# Patient Record
Sex: MALE | NOT HISPANIC OR LATINO | Employment: FULL TIME | URBAN - METROPOLITAN AREA
[De-identification: names, ages, dates, MRNs, and addresses within clinical notes are randomized per-mention and may not be internally consistent; named-entity substitution may affect disease eponyms.]

---

## 2017-04-29 ENCOUNTER — OFFICE VISIT (OUTPATIENT)
Dept: URGENT CARE | Facility: URGENT CARE | Age: 47
End: 2017-04-29
Payer: COMMERCIAL

## 2017-04-29 VITALS
WEIGHT: 245 LBS | TEMPERATURE: 96.2 F | OXYGEN SATURATION: 98 % | HEART RATE: 97 BPM | HEIGHT: 74 IN | SYSTOLIC BLOOD PRESSURE: 147 MMHG | BODY MASS INDEX: 31.44 KG/M2 | DIASTOLIC BLOOD PRESSURE: 96 MMHG

## 2017-04-29 DIAGNOSIS — R10.821 RIGHT UPPER QUADRANT ABDOMINAL TENDERNESS WITH REBOUND TENDERNESS: Primary | ICD-10-CM

## 2017-04-29 LAB
ALBUMIN UR-MCNC: 100 MG/DL
APPEARANCE UR: CLEAR
BILIRUB UR QL STRIP: NEGATIVE
C TRACH DNA SPEC QL NAA+PROBE: ABNORMAL
COLOR UR AUTO: YELLOW
GLUCOSE UR STRIP-MCNC: 500 MG/DL
HGB UR QL STRIP: ABNORMAL
KETONES UR STRIP-MCNC: NEGATIVE MG/DL
LEUKOCYTE ESTERASE UR QL STRIP: NEGATIVE
N GONORRHOEA DNA SPEC QL NAA+PROBE: ABNORMAL
NITRATE UR QL: NEGATIVE
PH UR STRIP: 5.5 PH (ref 5–7)
RBC #/AREA URNS AUTO: NORMAL /HPF (ref 0–2)
SP GR UR STRIP: 1.01 (ref 1–1.03)
SPECIMEN SOURCE: ABNORMAL
SPECIMEN SOURCE: ABNORMAL
URN SPEC COLLECT METH UR: ABNORMAL
UROBILINOGEN UR STRIP-ACNC: 1 EU/DL (ref 0.2–1)
WBC #/AREA URNS AUTO: NORMAL /HPF (ref 0–2)

## 2017-04-29 PROCEDURE — 87591 N.GONORRHOEAE DNA AMP PROB: CPT | Performed by: PHYSICIAN ASSISTANT

## 2017-04-29 PROCEDURE — 87491 CHLMYD TRACH DNA AMP PROBE: CPT | Performed by: PHYSICIAN ASSISTANT

## 2017-04-29 PROCEDURE — 81001 URINALYSIS AUTO W/SCOPE: CPT | Performed by: PHYSICIAN ASSISTANT

## 2017-04-29 PROCEDURE — 99203 OFFICE O/P NEW LOW 30 MIN: CPT | Performed by: PHYSICIAN ASSISTANT

## 2017-04-29 NOTE — LETTER
[facility] URGENT [facility]ARE  [address] [facility] [address]  Phone: [phone]    April 29, 2017        Scott Donato  [address] [place] [id number]          To whom it may concern:    RE: Scott Donato    Patient was seen and treated today at our clinic and missed work. Please excuse him from work on 4/29/2017.    Please contact me for questions or concerns.      Sincerely,        [facility] Provider

## 2017-04-29 NOTE — PROGRESS NOTES
"SUBJECTIVE  HPI:  Scott Donato is a 47 year old male who presents with the CC of abdominal/pelvic pain.    Pain is located in the RUQ area, with radiation to None.  The pain is characterized as dull, stabbing and throbbing, and at worst is a level 4 on a scale of 1-10.  Pain has been present for 1 week(s) and is rapidly progressively worsening. This morning at 3A, he noted that the pain in his abdomen has significantly increased.  EXACERBATING FACTORS: Pressure, movement  RELIEVING FACTORS: nothing.  ASSOCIATED SX: constipation.    No past medical history on file.  Current Outpatient Prescriptions   Medication Sig Dispense Refill     Insulin Aspart (NOVOLOG SC)        Insulin Detemir (LEVEMIR SC)        DOXYCYCLINE HYCLATE PO        aspirin 81 MG tablet Take by mouth daily       Social History   Substance Use Topics     Smoking status: Never Smoker     Smokeless tobacco: Not on file     Alcohol use Not on file     ROS:  C: NEGATIVE for fever, chills, change in weight  INTEGUMENTARY/SKIN: NEGATIVE for worrisome rashes, moles or lesions  E/M: NEGATIVE for sore throat, ear or sinus problems  R: NEGATIVE for cough  CV: NEGATIVE for chest pain, palpitations or peripheral edema  GI: POSITIVE for abdominal pain. POSITIVE for constipation.  : NEGATIVE for dysuria, hematuria, decreased urinary stream or discharge  MUSCULOSKELETAL: NEGATIVE for significant arthralgias or myalgia  NEURO: NEGATIVE for weakness, dizziness or paresthesias  ENDOCRINE: POSITIVE for HX of DM2  HEME/ALLERGY/IMMUNE: NEGATIVE for swollen nodes      OBJECTIVE:  BP (!) 147/96 (BP Location: Left arm, Patient Position: Chair, Cuff Size: Adult Large)  Pulse 97  Temp 96.2  F (35.7  C) (Tympanic)  Ht 6' 2\" (1.88 m)  Wt 245 lb (111.1 kg)  SpO2 98%  BMI 31.46 kg/m2  GENERAL APPEARANCE: healthy, alert and no distress  NECK: supple, nontender, no lymphadenopathy  RESP: lungs clear to auscultation - no rales, rhonchi or wheezes  CV: regular rates and " rhythm, normal S1 S2, no murmur noted  ABDOMEN: obese, soft, tenderness moderate RUQ. NO rebound tenderness.   NEURO: Normal strength and tone, sensory exam grossly normal,  normal speech and mentation  SKIN: no suspicious lesions or rashes    ASSESSMENT/PLAN:  1. Abdominal pain  Patient has tenderness to palpation with light touch. Sending to ER for further evaluation.   - *UA reflex to Microscopic and Culture ([place] [facility] (except [place] and [place])  - Urine Microscopic    Lindsey May PA-C

## 2017-04-29 NOTE — MR AVS SNAPSHOT
"              After Visit Summary   2017    Scott Donato    MRN: [id number]           Patient Information     Date Of Birth          1970        Visit Information        Provider Department      2017 8:15 AM Lindsey May PA-C [facility]        Today's Diagnoses     Right upper quadrant abdominal tenderness with rebound tenderness    -  1       Follow-ups after your visit        Who to contact     If you have questions or need follow up information about today's clinic visit or your schedule please contact [facility]RG[facility] directly at [phone].  Normal or non-critical lab and imaging results will be communicated to you by [employer], letter or phone within 4 business days after the clinic has received the results. If you do not hear from us within 7 days, please contact the clinic through [employer] or phone. If you have a critical or abnormal lab result, we will notify you by phone as soon as possible.  Submit refill requests through [employer] or call your pharmacy and they will forward the refill request to us. Please allow 3 business days for your refill to be completed.          Additional Information About Your Visit        MyChart Information     [employer] lets you send messages to your doctor, view your test results, renew your prescriptions, schedule appointments and more. To sign up, go to [email or web address] . Click on \"Log in\" on the left side of the screen, which will take you to the Welcome page. Then click on \"Sign up Now\" on the right side of the page.     You will be asked to enter the access code listed below, as well as some personal information. Please follow the directions to create your username and password.     Your access code is: [email or web address]  Expires: 2017  9:17 AM     Your access code will  in 90 days. If you need help or a new code, please call your [place] clinic or [phone].        Care EveryWhere ID     " "This is your Care EveryWhere ID. This could be used by other organizations to access your [place] medical records  [id number]        Your Vitals Were     Pulse Temperature Height Pulse Oximetry BMI (Body Mass Index)       97 96.2  F (35.7  C) (Tympanic) 6' 2\" (1.88 m) 98% 31.46 kg/m2        Blood Pressure from Last 3 Encounters:   04/29/17 (!) 147/96    Weight from Last 3 Encounters:   04/29/17 245 lb (111.1 kg)              We Performed the Following     *UA reflex to Microscopic and Culture ([place] and [place] [facility] (except [place] and [place])     Chlamydia trachomatis PCR     Neisseria gonorrhoeae PCR     Urine Microscopic        Primary Care Provider    None Specified       No primary provider on file.        Thank you!     Thank you for choosing [facility]  for your care. Our goal is always to provide you with excellent care. Hearing back from our patients is one way we can continue to improve our services. Please take a few minutes to complete the written survey that you may receive in the mail after your visit with us. Thank you!             Your Updated Medication List - Protect others around you: Learn how to safely use, store and throw away your medicines at www.disposemymeds.org.          This list is accurate as of: 4/29/17  9:17 AM.  Always use your most recent med list.                   Brand Name Dispense Instructions for use    aspirin 81 MG tablet      Take by mouth daily       DOXYCYCLINE HYCLATE PO          LEVEMIR SC          NOVOLOG SC            "

## 2017-04-29 NOTE — LETTER
[facility] URGENT [facility]  [address] [facility] [address]  Phone: [phone]    April 29, 2017        Scott Donato  [address]  [facility] [address]          To whom it may concern:    RE: Scott Donato    Patient was seen and treated today at our clinic and missed work. Please excuse him from work on 4/29/2017.     Please contact me for questions or concerns.      Sincerely,        [facility] Provider

## 2017-04-29 NOTE — NURSING NOTE
"Chief Complaint   Patient presents with     Urgent Care     Pt in clinic to have eval for right side abdominal pain.     Abdominal Pain       Initial BP (!) 147/96 (BP Location: Left arm, Patient Position: Chair, Cuff Size: Adult Large)  Pulse 97  Temp 96.2  F (35.7  C) (Tympanic)  Ht 6' 2\" (1.88 m)  Wt 245 lb (111.1 kg)  SpO2 98%  BMI 31.46 kg/m2 Estimated body mass index is 31.46 kg/(m^2) as calculated from the following:    Height as of this encounter: 6' 2\" (1.88 m).    Weight as of this encounter: 245 lb (111.1 kg).  Medication Reconciliation: complete   Bridget Conteh/ MA    "

## 2024-07-02 ENCOUNTER — TELEPHONE (OUTPATIENT)
Dept: CARDIOLOGY | Facility: CLINIC | Age: 54
End: 2024-07-02
Payer: COMMERCIAL

## 2024-07-02 ENCOUNTER — PREP FOR PROCEDURE (OUTPATIENT)
Dept: CARDIOLOGY | Facility: CLINIC | Age: 54
End: 2024-07-02
Payer: COMMERCIAL

## 2024-07-02 DIAGNOSIS — I25.10 CAD (CORONARY ARTERY DISEASE): Primary | ICD-10-CM

## 2024-07-02 NOTE — TELEPHONE ENCOUNTER
RNCC spoke with patient regarding surgery at the  with Dr. Giles. Reviewed pre operative and post operative eduction including sternal precautions and expected hospital course. Will plan for surgery with Dr. Giles 8/13 with pre operative testing at the [facility] prior. Patient verbalized understanding. Email sent to patient with pre operative folder as well as RNCC contact information.

## 2024-07-03 ENCOUNTER — HOSPITAL ENCOUNTER (INPATIENT)
Facility: CLINIC | Age: 54
Setting detail: SURGERY ADMIT
End: 2024-07-03
Attending: THORACIC SURGERY (CARDIOTHORACIC VASCULAR SURGERY) | Admitting: THORACIC SURGERY (CARDIOTHORACIC VASCULAR SURGERY)
Payer: COMMERCIAL

## 2024-07-03 DIAGNOSIS — I25.10 CAD (CORONARY ARTERY DISEASE): Primary | ICD-10-CM

## 2024-07-05 ENCOUNTER — TELEPHONE (OUTPATIENT)
Dept: CARDIOLOGY | Facility: CLINIC | Age: 54
End: 2024-07-05
Payer: COMMERCIAL

## 2024-07-05 NOTE — TELEPHONE ENCOUNTER
FUTURE VISIT INFORMATION      SURGERY INFORMATION:  Date: 7/23/2024  Location: [facility] OR   Surgeon:  Evan Giles MD   Anesthesia Type:  General   Procedure: INSERTION, INTRA-AORTIC BALLOON PUMP   CORONARY ARTERY BYPASS GRAFT AND ANY INDICATED PROCEDURES     Action    Action Taken 7/5/2024 2:56pm KEKHANH     I called pt Scott - Yes, his has a PCP. [facility] [phone]     RECORDS REQUESTED FROM:       Primary Care Provider: PCP @ [facility]     Pertinent Medical History:    Most recent EKG+ Tracing: Order     Most recent ECHO:    Most recent Cardiac Stress Test: NA    Most recent Coronary Angiogram:    Most recent PFT's: Upcoming     Most recent Sleep Study: NA

## 2024-07-08 ENCOUNTER — TELEPHONE (OUTPATIENT)
Dept: CARDIOLOGY | Facility: CLINIC | Age: 54
End: 2024-07-08
Payer: COMMERCIAL

## 2024-07-20 ENCOUNTER — HEALTH MAINTENANCE LETTER (OUTPATIENT)
Age: 54
End: 2024-07-20

## 2024-07-22 LAB
ABO/RH(D): NORMAL
ANTIBODY SCREEN: NEGATIVE
SPECIMEN EXPIRATION DATE: NORMAL

## 2024-07-23 ENCOUNTER — OFFICE VISIT (OUTPATIENT)
Dept: SURGERY | Facility: CLINIC | Age: 54
End: 2024-07-23
Payer: COMMERCIAL

## 2024-07-23 ENCOUNTER — PRE VISIT (OUTPATIENT)
Dept: SURGERY | Facility: CLINIC | Age: 54
End: 2024-07-23

## 2024-07-23 ENCOUNTER — ANCILLARY PROCEDURE (OUTPATIENT)
Dept: ULTRASOUND IMAGING | Facility: CLINIC | Age: 54
End: 2024-07-23
Attending: THORACIC SURGERY (CARDIOTHORACIC VASCULAR SURGERY)
Payer: COMMERCIAL

## 2024-07-23 ENCOUNTER — OFFICE VISIT (OUTPATIENT)
Dept: PULMONOLOGY | Facility: CLINIC | Age: 54
End: 2024-07-23
Attending: THORACIC SURGERY (CARDIOTHORACIC VASCULAR SURGERY)
Payer: COMMERCIAL

## 2024-07-23 ENCOUNTER — LAB (OUTPATIENT)
Dept: LAB | Facility: CLINIC | Age: 54
End: 2024-07-23
Payer: COMMERCIAL

## 2024-07-23 ENCOUNTER — ANCILLARY PROCEDURE (OUTPATIENT)
Dept: GENERAL RADIOLOGY | Facility: CLINIC | Age: 54
End: 2024-07-23
Attending: THORACIC SURGERY (CARDIOTHORACIC VASCULAR SURGERY)
Payer: COMMERCIAL

## 2024-07-23 VITALS
HEART RATE: 92 BPM | BODY MASS INDEX: 30.29 KG/M2 | SYSTOLIC BLOOD PRESSURE: 106 MMHG | RESPIRATION RATE: 16 BRPM | OXYGEN SATURATION: 97 % | TEMPERATURE: 97.6 F | HEIGHT: 74 IN | WEIGHT: 236 LBS | DIASTOLIC BLOOD PRESSURE: 74 MMHG

## 2024-07-23 DIAGNOSIS — I25.10 CAD (CORONARY ARTERY DISEASE): ICD-10-CM

## 2024-07-23 DIAGNOSIS — Z01.818 PRE-OP EVALUATION: Primary | ICD-10-CM

## 2024-07-23 LAB
ALBUMIN SERPL BCG-MCNC: 4.4 G/DL (ref 3.5–5.2)
ALBUMIN UR-MCNC: NEGATIVE MG/DL
ALP SERPL-CCNC: 146 U/L (ref 40–150)
ALT SERPL W P-5'-P-CCNC: 21 U/L (ref 0–70)
ANION GAP SERPL CALCULATED.3IONS-SCNC: 11 MMOL/L (ref 7–15)
APPEARANCE UR: CLEAR
APTT PPP: 30 SECONDS (ref 22–38)
AST SERPL W P-5'-P-CCNC: 27 U/L (ref 0–45)
BILIRUB SERPL-MCNC: 0.7 MG/DL
BILIRUB UR QL STRIP: NEGATIVE
BUN SERPL-MCNC: 26.4 MG/DL (ref 6–20)
CALCIUM SERPL-MCNC: 9.7 MG/DL (ref 8.8–10.4)
CHLORIDE SERPL-SCNC: 100 MMOL/L (ref 98–107)
COLOR UR AUTO: ABNORMAL
CREAT SERPL-MCNC: 1.38 MG/DL (ref 0.67–1.17)
EGFRCR SERPLBLD CKD-EPI 2021: 60 ML/MIN/1.73M2
ERYTHROCYTE [DISTWIDTH] IN BLOOD BY AUTOMATED COUNT: 13.8 % (ref 10–15)
GLUCOSE SERPL-MCNC: 167 MG/DL (ref 70–99)
GLUCOSE UR STRIP-MCNC: >=1000 MG/DL
HCO3 SERPL-SCNC: 30 MMOL/L (ref 22–29)
HCT VFR BLD AUTO: 47.4 % (ref 40–53)
HGB BLD-MCNC: 15.8 G/DL (ref 13.3–17.7)
HGB UR QL STRIP: NEGATIVE
INR PPP: 0.96 (ref 0.85–1.15)
KETONES UR STRIP-MCNC: NEGATIVE MG/DL
LEUKOCYTE ESTERASE UR QL STRIP: NEGATIVE
MAGNESIUM SERPL-MCNC: 2.2 MG/DL (ref 1.7–2.3)
MCH RBC QN AUTO: 30.3 PG (ref 26.5–33)
MCHC RBC AUTO-ENTMCNC: 33.3 G/DL (ref 31.5–36.5)
MCV RBC AUTO: 91 FL (ref 78–100)
NITRATE UR QL: NEGATIVE
PH UR STRIP: 5.5 [PH] (ref 5–7)
PLATELET # BLD AUTO: 173 10E3/UL (ref 150–450)
POTASSIUM SERPL-SCNC: 4.1 MMOL/L (ref 3.4–5.3)
PREALB SERPL-MCNC: 38.3 MG/DL (ref 20–40)
PROT SERPL-MCNC: 8 G/DL (ref 6.4–8.3)
RBC # BLD AUTO: 5.21 10E6/UL (ref 4.4–5.9)
RBC URINE: 1 /HPF
SODIUM SERPL-SCNC: 141 MMOL/L (ref 135–145)
SP GR UR STRIP: 1.01 (ref 1–1.03)
UROBILINOGEN UR STRIP-MCNC: NORMAL MG/DL
WBC # BLD AUTO: 5.8 10E3/UL (ref 4–11)
WBC URINE: 1 /HPF

## 2024-07-23 PROCEDURE — 94729 DIFFUSING CAPACITY: CPT | Performed by: INTERNAL MEDICINE

## 2024-07-23 PROCEDURE — 94375 RESPIRATORY FLOW VOLUME LOOP: CPT | Performed by: INTERNAL MEDICINE

## 2024-07-23 PROCEDURE — 71046 X-RAY EXAM CHEST 2 VIEWS: CPT | Mod: GC | Performed by: RADIOLOGY

## 2024-07-23 PROCEDURE — 84134 ASSAY OF PREALBUMIN: CPT | Performed by: THORACIC SURGERY (CARDIOTHORACIC VASCULAR SURGERY)

## 2024-07-23 PROCEDURE — 99000 SPECIMEN HANDLING OFFICE-LAB: CPT | Performed by: PATHOLOGY

## 2024-07-23 PROCEDURE — 93880 EXTRACRANIAL BILAT STUDY: CPT | Performed by: RADIOLOGY

## 2024-07-23 PROCEDURE — 85610 PROTHROMBIN TIME: CPT | Performed by: PATHOLOGY

## 2024-07-23 PROCEDURE — 81001 URINALYSIS AUTO W/SCOPE: CPT | Performed by: PATHOLOGY

## 2024-07-23 PROCEDURE — 86900 BLOOD TYPING SEROLOGIC ABO: CPT

## 2024-07-23 PROCEDURE — 80053 COMPREHEN METABOLIC PANEL: CPT | Performed by: PATHOLOGY

## 2024-07-23 PROCEDURE — 85027 COMPLETE CBC AUTOMATED: CPT | Performed by: PATHOLOGY

## 2024-07-23 PROCEDURE — 36415 COLL VENOUS BLD VENIPUNCTURE: CPT | Performed by: PATHOLOGY

## 2024-07-23 PROCEDURE — 85730 THROMBOPLASTIN TIME PARTIAL: CPT | Performed by: PATHOLOGY

## 2024-07-23 PROCEDURE — 93970 EXTREMITY STUDY: CPT | Performed by: RADIOLOGY

## 2024-07-23 PROCEDURE — 99204 OFFICE O/P NEW MOD 45 MIN: CPT | Performed by: NURSE PRACTITIONER

## 2024-07-23 PROCEDURE — 94726 PLETHYSMOGRAPHY LUNG VOLUMES: CPT | Performed by: INTERNAL MEDICINE

## 2024-07-23 PROCEDURE — 93000 ELECTROCARDIOGRAM COMPLETE: CPT | Performed by: INTERNAL MEDICINE

## 2024-07-23 PROCEDURE — 83735 ASSAY OF MAGNESIUM: CPT | Performed by: PATHOLOGY

## 2024-07-23 RX ORDER — EPINEPHRINE 0.3 MG/.3ML
0.3 INJECTION SUBCUTANEOUS PRN
COMMUNITY
Start: 2023-04-18

## 2024-07-23 RX ORDER — B-COMPLEX WITH VITAMIN C
1 TABLET ORAL DAILY
COMMUNITY
Start: 2024-06-14

## 2024-07-23 RX ORDER — EPLERENONE 25 MG/1
25 TABLET, FILM COATED ORAL EVERY MORNING
COMMUNITY
Start: 2024-06-11

## 2024-07-23 RX ORDER — BUMETANIDE 1 MG/1
1 TABLET ORAL PRN
COMMUNITY
Start: 2024-05-07

## 2024-07-23 RX ORDER — ALBUTEROL SULFATE 90 UG/1
2 AEROSOL, METERED RESPIRATORY (INHALATION) EVERY 4 HOURS PRN
COMMUNITY
Start: 2024-01-11

## 2024-07-23 RX ORDER — ATORVASTATIN CALCIUM 40 MG/1
40 TABLET, FILM COATED ORAL AT BEDTIME
COMMUNITY
Start: 2024-04-18

## 2024-07-23 RX ORDER — CARVEDILOL 6.25 MG/1
6.25 TABLET ORAL 2 TIMES DAILY WITH MEALS
Status: ON HOLD | COMMUNITY
Start: 2024-06-18 | End: 2024-09-16

## 2024-07-23 RX ORDER — CETIRIZINE HYDROCHLORIDE 10 MG/1
10 TABLET ORAL DAILY
COMMUNITY

## 2024-07-23 RX ORDER — BUDESONIDE AND FORMOTEROL FUMARATE DIHYDRATE 80; 4.5 UG/1; UG/1
2 AEROSOL RESPIRATORY (INHALATION) PRN
COMMUNITY
Start: 2023-07-17

## 2024-07-23 RX ORDER — SEMAGLUTIDE 2.68 MG/ML
2 INJECTION, SOLUTION SUBCUTANEOUS
COMMUNITY
Start: 2024-07-10

## 2024-07-23 ASSESSMENT — LIFESTYLE VARIABLES: TOBACCO_USE: 1

## 2024-07-23 ASSESSMENT — NEW YORK HEART ASSOCIATION (NYHA) CLASSIFICATION: NYHA FUNCTIONAL CLASS: III

## 2024-07-23 ASSESSMENT — ENCOUNTER SYMPTOMS: ORTHOPNEA: 1

## 2024-07-23 ASSESSMENT — PAIN SCALES - GENERAL: PAINLEVEL: NO PAIN (0)

## 2024-07-23 NOTE — PATIENT INSTRUCTIONS
Preparing for Your Surgery      Name:  Scott Donato   MRN:  [id number]   :  1970   Today's Date:  2024       Arriving for surgery:  Surgery date:  24  Arrival time:  5.30AM    Please come to:     Please come to:       [facility] [employer] [facility]    [address] [place]  [address]     The [facility] [place] Patient/Visitor Ramp is at [address]. Patients and visitors who self-park will receive the reduced hospital parking rate. If the Patient /Visitor Ramp is full, please follow the signs to the [employer] car park located at the main hospital entrance.       parking is available (24 hours/ 7 days a week)      Discounted parking pass options are available for patients and visitors. They can be purchased at the [employer] desk at the main hospital entrance.     -    Stop at the security desk and they will direct surgery patients to the Surgery Check in and Family [facility]. [phone]        - If you need directions, a wheelchair or an escort please stop at the Information/security desk in the lobby.     What can I eat or drink?  -  You may eat and drink normally up to 8 hours prior to arrival time. (Until 9.30PM)  -  You may have clear liquids until 2 hours prior to arrival time. (Until 3.30AM)    Examples of clear liquids:  Water  Clear broth  Juices (apple, white grape, white cranberry  and cider) without pulp  Noncarbonated, powder based beverages  (lemonade and Mat-Aid)  Sodas (Sprite, 7-Up, ginger ale and seltzer)  Coffee or tea (without milk or cream)  Gatorade    -  No Alcohol or cannabis products for at least 24 hours before surgery.     Which medicines can I take?    Hold Multivitamins for 10 days before surgery.  Hold Supplements for 10 days before surgery.  Hold Ibuprofen (Advil, Motrin) for 10 day(s) before surgery--unless otherwise directed by surgeon.  Hold Naproxen (Aleve)  for 10 days before surgery.    Hold Jardiance for 3 days before surgery.  Hold Sacubitril-valsartan (Entresto) for 2 days before surgery.  Hold Ozempic injection on Friday 8/9/24. Last dose is on Friday 8/2/24.    -  DO NOT take these medications the day of surgery:  Continue to hold Jardiance, Entresto and Ozempic injection.  Aspirin  Bumetanide (Bumex)  Cetirizine (Zyrtec)      -  PLEASE TAKE these medications the day of surgery:  Doxycycline  Albuterol inhaler as needed  Carvedilol (Coreg)        How do I prepare myself?  - Please take 2 showers (one the night prior to surgery and one the morning of surgery) using Scrubcare or Hibiclens soap.    Use this soap only from the neck to your toes.     Leave the soap on your skin for one minute--then rinse thoroughly.      You may use your own shampoo and conditioner. No other hair products.   - Please remove all jewelry and body piercings.  - No lotions, deodorants or fragrance.  - No makeup or fingernail polish.   - Bring your ID and insurance card.    -If you use a CPAP machine, please bring the CPAP machine, tubing, and mask to hospital.    -If you have a Deep Brain Stimulator, Spinal Cord Stimulator, or any Neuro Stimulator device---you must bring the remote control to the hospital.      ALL PATIENTS GOING HOME THE SAME DAY OF SURGERY ARE REQUIRED TO HAVE A RESPONSIBLE ADULT TO DRIVE AND BE IN ATTENDANCE WITH THEM FOR 24 HOURS FOLLOWING SURGERY.    Covid testing policy as of 12/06/2022  Your surgeon will notify and schedule you for a COVID test if one is needed before surgery--please direct any questions or COVID symptoms to your surgeon      Questions or Concerns:    - For any questions regarding the day of surgery or your hospital stay, please contact the Pre Admission Nursing Office at [phone].       - If you have health changes between today and your surgery, please call your surgeon.       - For questions after surgery, please call your surgeons office.            Current Visitor Guidelines    You may have 2 visitors in the pre op area.    Visiting hours: 8 a.m. to 8:30 p.m.    Patients confirmed or suspected to have symptoms of COVID 19 or flu:     No visitors allowed for adult patients.   Children (under age 18) can have 1 named visitor.     People who are sick or showing symptoms of COVID 19 or flu:    Are not allowed to visit patients--we can only make exceptions in special situations.       Please follow these guidelines for your visit:          Please maintain social distance          Masking is optional--however at times you may be asked to wear a mask for the safety of yourself and others     Clean your hands with alcohol hand . Do this when you arrive at and leave the building and patient room,    And again after you touch your mask or anything in the room.     Go directly to and from the room you are visiting.     Stay in the patient s room during your visit. Limit going to other places in the hospital as much as possible     Leave bags and jackets at home or in the car.     For everyone s health, please don t come and go during your visit. That includes for smoking   during your visit.

## 2024-07-23 NOTE — H&P
Pre-Operative H & P     CC:  Preoperative exam to assess for increased cardiopulmonary risk while undergoing surgery and anesthesia.    Date of Encounter: 7/23/2024  Primary Care Physician:  Fran Irwin     Reason for visit: Pre-operative evaluation      HPI  Scott Donato is a 54 year old male who presents for pre-operative H & P in preparation for  Procedure Information       Case: [id number] Date/Time: 08/13/24 0800    Procedures:       INSERTION, INTRA-AORTIC BALLOON PUMP (Chest)      CORONARY ARTERY BYPASS GRAFT AND ANY INDICATED PROCEDURES (Chest)    Anesthesia type: General    Diagnosis: CAD (coronary artery disease) [I25.10]    Pre-op diagnosis: CAD (coronary artery disease) [I25.10]    Location:  OR [address] [place] [address] OR    Providers: Evan Giles MD              Mr. Donato is a 54yoM with past medical history of Asthma, ETOH abuse, HL, DM2 and   HFrEF.  He recently presented to the ER with chest pain and exertional shortness of breath. Workup including TTE showed EF 10%, Coronary angiogram showed severe 3V CAD, cardiac MRI showed viable myocardium. ( See full reports below)    He has consulted with Dr. Giles and presents today in preparation for the above procedures.       History is obtained from the patient and chart review    Hx of abnormal bleeding or anti-platelet use: aspirin      Past Medical History  Past Medical History:   Diagnosis Date    Asthma     CAD (coronary artery disease)     Charcot foot due to diabetes mellitus (H)     DM2 (diabetes mellitus, type 2) (H)     Dyspnea on exertion     Former smoker     Heart failure with reduced ejection fraction, NYHA class III (H)     Hyperlipidemia LDL goal <100     Orthopnea        Past Surgical History  Past Surgical History:   Procedure Laterality Date    eft foot s/p transmetatarsal amputation  Left 2023       Prior to Admission Medications  Current Outpatient Medications   Medication Sig Dispense Refill    albuterol (PROAIR HFA/PROVENTIL  HFA/VENTOLIN HFA) 108 (90 Base) MCG/ACT inhaler Inhale 2 puffs into the lungs every 4 hours as needed      aspirin 81 MG tablet Take 81 mg by mouth every morning      atorvastatin (LIPITOR) 40 MG tablet Take 40 mg by mouth nightly as needed      budesonide-formoterol (SYMBICORT) 80-4.5 MCG/ACT Inhaler Inhale 2 puffs into the lungs as needed      bumetanide (BUMEX) 1 MG tablet Take 1 mg by mouth 2 times daily      Calcium Carbonate-Vitamin D (OYSTER SHELL CALCIUM/D) 500-5 MG-MCG TABS Take 1 tablet by mouth daily      carvedilol (COREG) 6.25 MG tablet Take 6.25 mg by mouth 2 times daily (with meals)      cetirizine (ZYRTEC) 10 MG tablet Take 10 mg by mouth daily      DOXYCYCLINE HYCLATE PO Take 100 mg by mouth every morning      empagliflozin (JARDIANCE) 25 MG TABS tablet Take 25 mg by mouth every morning      EPINEPHrine (ANY BX GENERIC EQUIV) 0.3 MG/0.3ML injection 2-pack Inject 0.3 mg into the muscle as needed for anaphylaxis      eplerenone (INSPRA) 25 MG tablet Take 25 mg by mouth daily      OZEMPIC, 2 MG/DOSE, 8 MG/3ML pen Inject 2 mg subcutaneously every 7 days       sacubitril-valsartan (ENTRESTO) 49-51 MG per tablet Take 1 tablet by mouth 2 times daily      Insulin Aspart (NOVOLOG SC)       Insulin Detemir (LEVEMIR SC)          Allergies  Allergies   Allergen Reactions    Bee Venom Anaphylaxis       Social History  Social History     Socioeconomic History    Marital status: Single     Spouse name: Not on file    Number of children: Not on file    Years of education: Not on file    Highest education level: Not on file   Occupational History    Not on file   Tobacco Use    Smoking status: Former     Current packs/day: 0.00     Types: Cigarettes     Quit date: 2018     Years since quittin.5    Smokeless tobacco: Never   Substance and Sexual Activity    Alcohol use: Yes     Comment: 2x a year    Drug use: Not Currently    Sexual activity: Not on file   Other Topics Concern    Not on file   Social  History Narrative    Not on file     Social Determinants of Health     Financial Resource Strain: Low Risk  (3/13/2024)    Received from [facility] [place]    Overall Financial Resource Strain (CARDIA)     Difficulty of Paying Living Expenses: Not hard at all   Food Insecurity: No Food Insecurity (3/13/2024)    Received from [facility] [place]    Hunger Vital Sign     Worried About Running Out of Food in the Last Year: Never true     Ran Out of Food in the Last Year: Never true   Transportation Needs: No Transportation Needs (3/13/2024)    Received from [facility] [place]    PRAPARE - Transportation     Lack of Transportation (Medical): No     Lack of Transportation (Non-Medical): No   Physical Activity: Not on File (12/15/2023)    Received from [facility]    Physical Activity     Physical Activity: 0   Stress: Not on File (12/15/2023)    Received from [facility]    Stress     Stress: 0   Social Connections: Not on File (12/15/2023)    Received from [facility]    Social Connections     Social Connections and Isolation: 0   Interpersonal Safety: Not At Risk (3/13/2024)    Received from [facility] [place]    Humiliation, Afraid, Rape, and Kick questionnaire     Fear of Current or Ex-Partner: No     Emotionally Abused: No     Physically Abused: No     Sexually Abused: No   Housing Stability: Low Risk  (3/13/2024)    Received from [facility] [place]    Housing Stability     What is your housing situation today?: 3 - I have housing       Family History  No family history on file.    Review of Systems  The complete review of systems is negative other than noted in the HPI or here.   Anesthesia Evaluation   Pt has had prior anesthetic. Type: General and MAC.    History of anesthetic complications  - .  patient feels dis-connected with phentyal..    ROS/MED HX  ENT/Pulmonary:     (+)     JARAD risk factors, snores loudly,          tobacco use, Past use,    Intermittent, asthma  Treatment: Inhaler prn,                 Neurologic:  - neg  "neurologic ROS     Cardiovascular:     (+) Dyslipidemia - -  CAD -  - -   Taking blood thinners Pt has received instructions: Instructions Given to patient: hold aspirin DOS. CHF etiology: mixed ICCM & NICM Last EF: 10-14% date: 3/2024 NYHA classification: III. PALOMINO. orthopnea/PND,                     Previous cardiac testing   Echo: Date: 6/2024 Results:    Stress Test:  Date: Results:    ECG Reviewed:  Date: Results:    Cath:  Date: 3/14/2024 Results:      METS/Exercise Tolerance: 3 - Able to walk 1-2 blocks without stopping    Hematologic: Comments: Microhematuria        Musculoskeletal: Comment: Osteomyelitis  Charcot arthropathy of left foot s/p transmetatarsal amputation        GI/Hepatic: Comment: TUMS PRN     (+) GERD,                   Renal/Genitourinary:  - neg Renal ROS     Endo: Comment: Hx of previously uncontrolled DM    (+)  type II DM, Last HgA1c: 6.1, date: 3/2024, Not using insulin,    Diabetic complications: cardiac problems.             Psychiatric/Substance Use: Comment: Has cut back drinking to 2-3x yearly since 2020.  Previously heavy drinking on the weekends.     (+)   alcohol abuse      Infectious Disease:  - neg infectious disease ROS     Malignancy:  - neg malignancy ROS     Other:            /74 (BP Location: Right arm, Patient Position: Sitting, Cuff Size: Adult Regular)   Pulse 92   Temp 97.6  F (36.4  C) (Oral)   Resp 16   Ht 1.88 m (6' 2\")   Wt 107 kg (236 lb)   SpO2 97%   BMI 30.30 kg/m      Physical Exam   Constitutional: Awake, alert, cooperative, no apparent distress, and appears stated age.  Eyes: Pupils equal, round and reactive to light, extra ocular muscles intact, sclera clear, conjunctiva normal.  HENT: Normocephalic, oral pharynx with moist mucus membranes, good dentition. No goiter appreciated.   Respiratory: Clear to auscultation bilaterally, no crackles or wheezing.  Cardiovascular: Regular rate and rhythm, normal S1 and S2, and no murmur noted.  Carotids " +2, no bruits. No edema. Palpable pulses to radial  DP and PT arteries.   GI: Normal bowel sounds, soft, non-distended, non-tender, no masses palpated, no hepatosplenomegaly.    Lymph/Hematologic: No cervical lymphadenopathy and no supraclavicular lymphadenopathy.  Genitourinary:  deferred  Skin: Warm and dry.  No rashes at anticipated surgical site.   Musculoskeletal: Full ROM of neck. There is no redness, warmth, or swelling of the joints. Gross motor strength is normal.    Neurologic: Awake, alert, oriented to name, place and time. Cranial nerves II-XII are grossly intact. Gait is normal.   Neuropsychiatric: Calm, cooperative. Normal affect.     Prior Labs/Diagnostic Studies   All labs and imaging personally reviewed     chocardiogram: (6/3/24)  CONCLUSIONS     SUMMARY     The estimated left ventricular ejection fraction is 21%.  Tricuspid regurgitation jet is not adequate for estimation of PA systolic  pressure.  Based on IVC geometry, the right atrial pressure is probably upper limit  of normal/mildly increased     Left ventricular enlargement .  Decreased left ventricular systolic performance, severe  Est Stroke volume 48 cc  Dilated cardiomyopathy .  Regional wall motion abnormality-inferolateral .     ADDITIONAL REMARKS     No significant valve disease  Left ventricular ejection fraction is improved when compared to the  previous study of 3/12/24  There appears to be interval resolution of the regional wall motion  abnormality involving the distal septum and apex---which appears to be (in  part) related to an apparent change in conduction. The prior study was  suggestive of a LBBB-type IVCD (which no longer appears to be present)      Findings/Results:  Right heart catheterization -     Mean RA 7 mmHg  RV 39/10 mmHg  PA 43/17 mmHg; mean 31 mm Hg  PCWP 22-23 mmHg     Cardiac output 3.5 L/min by thermodilution method (cardiac index 1.5  L/min/m^2)  Cardiac output 4.4 L/min by estimated Yaritza method (PA  saturation 72 %,  Index 1.9 L/min/m^2)     Please find complete angiographic detail below  LVEDP following angiography is 24 mmHg     Impression and recommendation:  Normal right-sided filling pressures; RA mean 7 mmHg  Mild pulmonary hypertension, predominantly secondary, with PA mean of 31  mmHg and PVR of approximately 2 Wood units  Elevated left ventricular filling pressures with pulmonary capillary wedge  pressure of 23 mmHg and directly measured LVEDP of 24 mmHg  Cardiogenic shock with cardiac output of 3.5 L/min and cardiac index of  1.5 L/min/m^2 using thermodilution measurement (lab standard)  Diffuse three-vessel coronary artery disease with severe stenoses noted in  all 3 major coronary territories  In addition to initiation of evidence-based therapies for dilated  cardiomyopathy, heart team approach to revascularization should be  considered in the setting of severely reduced left ventricular ejection  fraction and diabetes mellitus. Distal RCA (rPDA and/or rPLA1), second  obtuse marginal, distal LAD, and first diagonal branch would be potential  targets for surgical revascularization.  Findings discussed with the patient and cardiology consult team    _____________________________________________________________________    Echocardiogram 6/2024     IMPRESSION  Impression:     1) Severely reduced left ventricular ejection fraction, calculated EF 14%  2) Dilated LV cavity with asynchronous septal motion consistent with left  bundle-branch block.  3) Transmural late gadolinium hyperenhancement involving the apical  inferior wall, and subendocardial pattern of hyperenhancement involving  about 50% of the mid inferolateral wall. Apart from the apical inferior  wall, there is preserved myocardial viability in all other distributions.  4) Small pericardial effusion and bilateral pleural effusions.      CONCLUSIONS    SUMMARY    The estimated left ventricular ejection fraction is 21%.  Tricuspid regurgitation  "jet is not adequate for estimation of PA systolic  pressure.  Based on IVC geometry, the right atrial pressure is probably upper limit  of normal/mildly increased    Left ventricular enlargement .  Decreased left ventricular systolic performance, severe  Est Stroke volume 48 cc  Dilated cardiomyopathy .  Regional wall motion abnormality-inferolateral    EKG/ stress test - if available please see in ROS above   No results found.      Latest Ref Rng & Units 7/23/2024     8:55 AM   PFT   FVC L 4.45  P   FEV1 L 3.64  P   FVC% % 87  P   FEV1% % 91  P      P Preliminary result         The patient's records and results personally reviewed by this provider.     Outside records reviewed from: Care Everywhere    LAB/DIAGNOSTIC STUDIES TODAY:    Lab Results   Component Value Date    WBC 5.8 07/23/2024     Lab Results   Component Value Date    RBC 5.21 07/23/2024     Lab Results   Component Value Date    HGB 15.8 07/23/2024     Lab Results   Component Value Date    HCT 47.4 07/23/2024     No components found for: \"MCT\"  Lab Results   Component Value Date    MCV 91 07/23/2024     Lab Results   Component Value Date    MCH 30.3 07/23/2024     Lab Results   Component Value Date    MCHC 33.3 07/23/2024     Lab Results   Component Value Date    RDW 13.8 07/23/2024     Lab Results   Component Value Date     07/23/2024     Last Comprehensive Metabolic Panel:  Lab Results   Component Value Date     07/23/2024    POTASSIUM 4.1 07/23/2024    CHLORIDE 100 07/23/2024    CO2 30 (H) 07/23/2024    ANIONGAP 11 07/23/2024     (H) 07/23/2024    BUN 26.4 (H) 07/23/2024    CR 1.38 (H) 07/23/2024    GFRESTIMATED 60 (L) 07/23/2024    ANITA 9.7 07/23/2024       Lab Results   Component Value Date    AST 27 07/23/2024     Lab Results   Component Value Date    ALT 21 07/23/2024     No results found for: \"BILICONJ\"   Lab Results   Component Value Date    BILITOTAL 0.7 07/23/2024     Lab Results   Component Value Date    ALBUMIN 4.4 " 07/23/2024     Lab Results   Component Value Date    PROTTOTAL 8.0 07/23/2024      Lab Results   Component Value Date    ALKPHOS 146 07/23/2024       Assessment    Scott Donato is a 54 year old male seen as a PAC referral for risk assessment and optimization for anesthesia.    Plan/Recommendations  Pt will be optimized for the proposed procedure.  See below for details on the assessment, risk, and preoperative recommendations    NEUROLOGY  - No history of TIA, CVA or seizure    -Post Op delirium risk factors:  High co-morbid index    ENT  - No current airway concerns.  Will need to be reassessed day of surgery.  Mallampati: II  TM: > 3    CARDIAC  -HFrEF, new diagnosis  - Thought to be Likely mixed ischemic and non-ischemic cardiomyopathy    EF: 6/3/24 21%;   3/13/24 cMRI: 14%;   3/12/24: 8%, rWMA    Diffuse 3 vessel coronary artery disease    - CHF - managed on entresto, Bumex and carvedilol    -In clinic today - He denies any exertional chest pain, dyspnea, palpitations, syncope, orthopnea, edema or paroxysmal nocturnal dyspnea.     -Left bundle branch block-  Previous EKG with LBBB, EKG 5/27 Sinus rhythm w/o significant change  TTE as of 6/3 suggests resolution of LBBB.    - METS (Metabolic Equivalents)  METS 3 - limited by foot condition  Patient CANNOT perform 4 METS exercise without symptoms             Total Score: 1    Functional Capacity: Unable to complete 4 METS          PULMONARY    JARAD Medium Risk             Total Score: 3    JARAD: Snores loudly    JARAD: Over 50 ys old    JARAD: Male      - Asthma  Well controlled  - Tobacco History    History   Smoking Status    Former    Types: Cigarettes   Smokeless Tobacco    Never       GI  - GERD  Uses TUMS PRN  PONV Medium Risk  Total Score: 2           1 AN PONV: Patient is not a current smoker    1 AN PONV: Intended Post Op Opioids        /RENAL  - Baseline Creatinine  1.38    ENDOCRINE    - BMI: Estimated body mass index is 30.3 kg/m  as calculated from the  "following:    Height as of this encounter: 1.88 m (6' 2\").    Weight as of this encounter: 107 kg (236 lb).  Obesity (BMI >30)  - Diabetes  Diabetes Mellitus, Type 2, non-insulin dependent.  Hold morning oral hypoglycemic medications. Recommend close monitoring of the patient's blood glucose levels throughout the perioperative period.    A1c 6.1 (3/6/24)  -on empagliflozin 25 mg daily ( last dose 8/9)   -on ozempic ( last dose 8/2)    - Charcot arthropathy of left foot s/p transmetatarsal amputation   - His feet are doing very well and have remained healed with no new open wounds or sores.   - wearing his diabetic shoes and custom orthotics   - TMA incision healed with no signs of infection       HEME  VTE Low Risk 0.5%             Total Score: 2    VTE: Male      - Platelet disfunction second to Aspirin (Geri, many others)  Hold asa DOS, Last dose 8/12    MSK  Patient IS Frail             Total Score: 4    Frailty: Slower walking speed    Frailty: Decrease in strength    Frailty: Increased exhaustion    Frailty: Overall lack of energy      Patient's above symptoms are secondary to his EF of 10-15%   He is not frail, therefore did not put a referral in for PMNR.   He will participate in Cardiac Rehab following his above procedure.       PSYCH/ Substance abuse  - hx of ETOH use disorder  Previously a Heavy weekend drinker.   Since 2020 maybe has etoh 2-3x yearly.     Different anesthesia methods/types have been discussed with the patient, but they are aware that the final plan will be decided by the assigned anesthesia provider on the date of service.      The patient is optimized for their procedure. AVS with information on surgery time/arrival time, meds and NPO status given by nursing staff. No further diagnostic testing indicated.      On the day of service:     Prep time: 20 minutes  Visit time: 25 minutes  Documentation time: 10 minutes  ------------------------------------------  Total time: 55 " minutes      TRACY Sullivan CNP  Preoperative Assessment Center  [facility]  Cl[facility] and [facility] Center  Phone: [phone]  Fax: [phone]

## 2024-07-24 ENCOUNTER — ALLIED HEALTH/NURSE VISIT (OUTPATIENT)
Dept: RESEARCH | Facility: CLINIC | Age: 54
End: 2024-07-24
Payer: COMMERCIAL

## 2024-07-24 DIAGNOSIS — Z00.6 EXAMINATION OF PARTICIPANT OR CONTROL IN CLINICAL RESEARCH: Primary | ICD-10-CM

## 2024-07-24 LAB
ATRIAL RATE - MUSE: 94 BPM
DIASTOLIC BLOOD PRESSURE - MUSE: NORMAL MMHG
DLCOCOR-%PRED-PRE: 80 %
DLCOCOR-PRE: 25.45 ML/MIN/MMHG
DLCOUNC-%PRED-PRE: 82 %
DLCOUNC-PRE: 26.27 ML/MIN/MMHG
DLCOUNC-PRED: 31.81 ML/MIN/MMHG
ERV-%PRED-PRE: 93 %
ERV-PRE: 1.73 L
ERV-PRED: 1.85 L
EXPTIME-PRE: 6.23 SEC
FEF2575-%PRED-PRE: 110 %
FEF2575-PRE: 3.78 L/SEC
FEF2575-PRED: 3.41 L/SEC
FEFMAX-%PRED-PRE: 104 %
FEFMAX-PRE: 10.98 L/SEC
FEFMAX-PRED: 10.47 L/SEC
FEV1-%PRED-PRE: 91 %
FEV1-PRE: 3.64 L
FEV1FEV6-PRE: 82 %
FEV1FEV6-PRED: 80 %
FEV1FVC-PRE: 82 %
FEV1FVC-PRED: 78 %
FEV1SVC-PRE: 72 %
FEV1SVC-PRED: 79 %
FIFMAX-PRE: 9.15 L/SEC
FRCPLETH-%PRED-PRE: 98 %
FRCPLETH-PRE: 3.75 L
FRCPLETH-PRED: 3.81 L
FVC-%PRED-PRE: 87 %
FVC-PRE: 4.45 L
FVC-PRED: 5.07 L
IC-%PRED-PRE: 89 %
IC-PRE: 3.33 L
IC-PRED: 3.7 L
INTERPRETATION ECG - MUSE: NORMAL
P AXIS - MUSE: 49 DEGREES
PR INTERVAL - MUSE: 158 MS
QRS DURATION - MUSE: 90 MS
QT - MUSE: 366 MS
QTC - MUSE: 457 MS
R AXIS - MUSE: -30 DEGREES
RVPLETH-%PRED-PRE: 82 %
RVPLETH-PRE: 2.02 L
RVPLETH-PRED: 2.45 L
SYSTOLIC BLOOD PRESSURE - MUSE: NORMAL MMHG
T AXIS - MUSE: 55 DEGREES
TLCPLETH-%PRED-PRE: 89 %
TLCPLETH-PRE: 7.08 L
TLCPLETH-PRED: 7.94 L
VA-%PRED-PRE: 95 %
VA-PRE: 7.22 L
VC-%PRED-PRE: 100 %
VC-PRE: 5.06 L
VC-PRED: 5.04 L
VENTRICULAR RATE- MUSE: 94 BPM

## 2024-07-24 PROCEDURE — 99207 PR DROP WITH A PROCEDURE: CPT | Mod: 25

## 2024-07-24 RX ORDER — DEXMEDETOMIDINE HYDROCHLORIDE 4 UG/ML
.1-1.2 INJECTION, SOLUTION INTRAVENOUS CONTINUOUS
Status: CANCELLED | OUTPATIENT
Start: 2024-07-24

## 2024-07-24 RX ORDER — CEFAZOLIN SODIUM/WATER 2 G/20 ML
2 SYRINGE (ML) INTRAVENOUS SEE ADMIN INSTRUCTIONS
Status: CANCELLED | OUTPATIENT
Start: 2024-07-24

## 2024-07-24 RX ORDER — FAMOTIDINE 20 MG/1
20 TABLET, FILM COATED ORAL
Status: CANCELLED | OUTPATIENT
Start: 2024-07-24

## 2024-07-24 RX ORDER — CEFAZOLIN SODIUM/WATER 2 G/20 ML
2 SYRINGE (ML) INTRAVENOUS
Status: CANCELLED | OUTPATIENT
Start: 2024-07-24

## 2024-07-24 RX ORDER — CHLORHEXIDINE GLUCONATE ORAL RINSE 1.2 MG/ML
10 SOLUTION DENTAL ONCE
Status: CANCELLED | OUTPATIENT
Start: 2024-07-24 | End: 2024-07-24

## 2024-07-24 RX ORDER — ACETAMINOPHEN 325 MG/1
975 TABLET ORAL ONCE
Status: CANCELLED | OUTPATIENT
Start: 2024-07-24 | End: 2024-07-24

## 2024-07-24 RX ORDER — ASPIRIN 81 MG/1
81 TABLET, CHEWABLE ORAL
Status: CANCELLED | OUTPATIENT
Start: 2024-07-24

## 2024-07-24 RX ORDER — GABAPENTIN 300 MG/1
300 CAPSULE ORAL
Status: CANCELLED | OUTPATIENT
Start: 2024-07-24

## 2024-07-24 RX ORDER — ASPIRIN 81 MG/1
162 TABLET, CHEWABLE ORAL
Status: CANCELLED | OUTPATIENT
Start: 2024-07-24

## 2024-07-24 RX ORDER — PHENYLEPHRINE HCL IN 0.9% NACL 50MG/250ML
.1-6 PLASTIC BAG, INJECTION (ML) INTRAVENOUS CONTINUOUS
Status: CANCELLED | OUTPATIENT
Start: 2024-07-24

## 2024-07-24 RX ORDER — LIDOCAINE 40 MG/G
CREAM TOPICAL
Status: CANCELLED | OUTPATIENT
Start: 2024-07-24

## 2024-07-24 NOTE — PROGRESS NOTES
Surgery Clinical Trials Office Informed Consent Process Documentation  [employer] of Postoperative Complications and Near-Term Adverse Outcomes in Patients Undergoing Coronary Artery Bypass Graft Surgery  IRB#[id number]    ICF Version Date 05/16/2024  IRB Approval Date: 05/22/2024     Date of Consent : 07/24/24    The subject was screened and meets all of the inclusion criteria and none of the exclusion criteria is met.This note is documenting that the patient was contacted by the study team and consented to the study.    The subject was told:  -that the study involves research   -the purpose of the research study  -the expected duration of the study and the approximate number of subject sought  -of procedures that are identified as experimental  -of reasonably foreseeable risks or discomforts to the subject  -of any benefits to the subject or others that may be expected from the research  -of alternative procedures and/or treatment  -how the confidentiality of records would be maintained  -whether or not compensation and medical treatments are available should injury occur as a result of the study  -who to contact if they have questions related to the research study or questions regarding research subjects' rights  -that participation is completely voluntary and that their decision to or not to participate will have no impact on their relationships with the N and the staff    No study procedures were completed prior to the consent being obtained.  The use of historical information (lab or assessments) used for the purpose of the study was approved by subject.  The subject was fully aware that we would be reviewing their medical record for the study.  The subject demonstrated an understanding of what the study involved.  Specifically, how this study differed from standard of care at our center and what was required of the subject as part of the study.  The subject reviewed the consent form and was given the  opportunity to ask questions before signing.  Questions and concerns were answered by the study staff and/or study physician.    A copy of the signed informed consent document was offered to the subject:  [x] Yes [] No     The consent required the use of a :   [] Yes [x] No     A 'short form' consent was used:     [] Yes [x] No   If yes, provide name of witness:     This subject was previously mailed a consent form and contacted by the research team via phone:  [] Yes [x] No     An eConsent was used: [x] Yes [] No     Questions to Evaluate Subject Comprehension of Study:     Question: Adequate Response? If No, explain what actions were taken   What is being studied? [x] Yes  [] No   If you participate, what will be different than if you decide not to participate?  [x] Yes  [] No   How long will the study last; will you be required to return for visits? [x] Yes  [] No   What kinds of risks are there? [x] Yes  [] No      Do you understand that you can withdraw consent at any time and for any reason while participating in the study? [x] Yes  [] No      Study Coordinators:  Wing Brown  [phone]  [email or web address]  Yanira Duckworth  [phone]  [email or web address]     : Ricky Lundy        [phone]     [email or web address]   PI: Og Candelaria, PhD, [facility] [phone]  [email or web address]    Note:       Sign: Wing Brown on 7/24/2024 at 4:11 PM

## 2024-08-05 ENCOUNTER — TELEPHONE (OUTPATIENT)
Dept: CARDIOLOGY | Facility: CLINIC | Age: 54
End: 2024-08-05
Payer: COMMERCIAL

## 2024-08-05 NOTE — TELEPHONE ENCOUNTER
Due to a change in the providers schedule, pt needs to r/s their 8/13 surgery. LM asking pt to call back to r/s. Surgery moved from 8/13 to 8/12. Will try again later

## 2024-08-05 NOTE — TELEPHONE ENCOUNTER
Health Call Center    Phone Message    May a detailed message be left on voicemail: yes     Reason for Call: Other: Patient states he is returning a call to re-schedule his procedure. Please call him back. Thank you     Action Taken: Other: cardiology    Travel Screening: Not Applicable  Thank you!  Specialty Access Center       Date of Service:

## 2024-08-05 NOTE — TELEPHONE ENCOUNTER
Talked with pt and confirmed the surgery date change to 8/12 from 8/13. Will call if anything else changes

## 2024-08-12 ENCOUNTER — PREP FOR PROCEDURE (OUTPATIENT)
Dept: CARDIOLOGY | Facility: CLINIC | Age: 54
End: 2024-08-12
Payer: COMMERCIAL

## 2024-08-12 DIAGNOSIS — I25.10 CORONARY ARTERY DISEASE: Primary | ICD-10-CM

## 2024-08-13 DIAGNOSIS — I25.10 CAD (CORONARY ARTERY DISEASE): Primary | ICD-10-CM

## 2024-08-14 ENCOUNTER — TELEPHONE (OUTPATIENT)
Dept: CARDIOLOGY | Facility: CLINIC | Age: 54
End: 2024-08-14
Payer: COMMERCIAL

## 2024-08-14 NOTE — TELEPHONE ENCOUNTER
FUTURE VISIT INFORMATION      SURGERY INFORMATION:  Date: 9/10/24  Location: uu or  Surgeon:  Evan Giles MD   Anesthesia Type:  general  Procedure: Intra aortic Balloon Pump Insertion coronary artery bypass graft, Transesophageal Echocardiogram     RECORDS REQUESTED FROM:       Primary Care Provider: [facility]     Most recent EKG+ Tracin24    Most recent ECHO: 6/3/24- [facility]    Most recent PFT's: 24

## 2024-08-15 ENCOUNTER — TELEPHONE (OUTPATIENT)
Dept: CARDIOLOGY | Facility: CLINIC | Age: 54
End: 2024-08-15
Payer: COMMERCIAL

## 2024-09-02 LAB
ABO/RH(D): NORMAL
ANTIBODY SCREEN: NEGATIVE
SPECIMEN EXPIRATION DATE: NORMAL

## 2024-09-03 ENCOUNTER — ANCILLARY PROCEDURE (OUTPATIENT)
Dept: GENERAL RADIOLOGY | Facility: CLINIC | Age: 54
End: 2024-09-03
Attending: THORACIC SURGERY (CARDIOTHORACIC VASCULAR SURGERY)
Payer: COMMERCIAL

## 2024-09-03 ENCOUNTER — OFFICE VISIT (OUTPATIENT)
Dept: SURGERY | Facility: CLINIC | Age: 54
End: 2024-09-03
Payer: COMMERCIAL

## 2024-09-03 ENCOUNTER — PRE VISIT (OUTPATIENT)
Dept: SURGERY | Facility: CLINIC | Age: 54
End: 2024-09-03

## 2024-09-03 ENCOUNTER — LAB (OUTPATIENT)
Dept: LAB | Facility: CLINIC | Age: 54
End: 2024-09-03
Payer: COMMERCIAL

## 2024-09-03 ENCOUNTER — ANESTHESIA EVENT (OUTPATIENT)
Dept: SURGERY | Facility: CLINIC | Age: 54
DRG: 235 | End: 2024-09-03
Payer: COMMERCIAL

## 2024-09-03 VITALS
HEART RATE: 87 BPM | DIASTOLIC BLOOD PRESSURE: 72 MMHG | SYSTOLIC BLOOD PRESSURE: 104 MMHG | WEIGHT: 237.3 LBS | HEIGHT: 74 IN | TEMPERATURE: 98.7 F | OXYGEN SATURATION: 98 % | RESPIRATION RATE: 16 BRPM | BODY MASS INDEX: 30.45 KG/M2

## 2024-09-03 DIAGNOSIS — I25.10 CAD (CORONARY ARTERY DISEASE): ICD-10-CM

## 2024-09-03 DIAGNOSIS — I25.10 CORONARY ARTERY DISEASE INVOLVING NATIVE CORONARY ARTERY OF NATIVE HEART WITHOUT ANGINA PECTORIS: ICD-10-CM

## 2024-09-03 DIAGNOSIS — Z01.818 PREOP EXAMINATION: Primary | ICD-10-CM

## 2024-09-03 LAB
ALBUMIN SERPL BCG-MCNC: 4.3 G/DL (ref 3.5–5.2)
ALBUMIN UR-MCNC: 10 MG/DL
ALP SERPL-CCNC: 135 U/L (ref 40–150)
ALT SERPL W P-5'-P-CCNC: 22 U/L (ref 0–70)
ANION GAP SERPL CALCULATED.3IONS-SCNC: 10 MMOL/L (ref 7–15)
APPEARANCE UR: CLEAR
APTT PPP: 30 SECONDS (ref 22–38)
AST SERPL W P-5'-P-CCNC: 26 U/L (ref 0–45)
ATRIAL RATE - MUSE: 89 BPM
BILIRUB SERPL-MCNC: 0.6 MG/DL
BILIRUB UR QL STRIP: NEGATIVE
BUN SERPL-MCNC: 27.3 MG/DL (ref 6–20)
CALCIUM SERPL-MCNC: 9 MG/DL (ref 8.8–10.4)
CHLORIDE SERPL-SCNC: 101 MMOL/L (ref 98–107)
COLOR UR AUTO: ABNORMAL
CREAT SERPL-MCNC: 1.41 MG/DL (ref 0.67–1.17)
DIASTOLIC BLOOD PRESSURE - MUSE: NORMAL MMHG
EGFRCR SERPLBLD CKD-EPI 2021: 59 ML/MIN/1.73M2
ERYTHROCYTE [DISTWIDTH] IN BLOOD BY AUTOMATED COUNT: 13.1 % (ref 10–15)
GLUCOSE SERPL-MCNC: 230 MG/DL (ref 70–99)
GLUCOSE UR STRIP-MCNC: >=1000 MG/DL
HCO3 SERPL-SCNC: 28 MMOL/L (ref 22–29)
HCT VFR BLD AUTO: 45.3 % (ref 40–53)
HGB BLD-MCNC: 15.4 G/DL (ref 13.3–17.7)
HGB UR QL STRIP: NEGATIVE
INR PPP: 1 (ref 0.85–1.15)
INTERPRETATION ECG - MUSE: NORMAL
KETONES UR STRIP-MCNC: NEGATIVE MG/DL
LEUKOCYTE ESTERASE UR QL STRIP: NEGATIVE
MAGNESIUM SERPL-MCNC: 2.1 MG/DL (ref 1.7–2.3)
MCH RBC QN AUTO: 31.4 PG (ref 26.5–33)
MCHC RBC AUTO-ENTMCNC: 34 G/DL (ref 31.5–36.5)
MCV RBC AUTO: 92 FL (ref 78–100)
NITRATE UR QL: NEGATIVE
P AXIS - MUSE: 56 DEGREES
PH UR STRIP: 5.5 [PH] (ref 5–7)
PLATELET # BLD AUTO: 156 10E3/UL (ref 150–450)
POTASSIUM SERPL-SCNC: 4.1 MMOL/L (ref 3.4–5.3)
PR INTERVAL - MUSE: 162 MS
PREALB SERPL-MCNC: 35.9 MG/DL (ref 20–40)
PROT SERPL-MCNC: 7.7 G/DL (ref 6.4–8.3)
QRS DURATION - MUSE: 86 MS
QT - MUSE: 376 MS
QTC - MUSE: 457 MS
R AXIS - MUSE: -27 DEGREES
RBC # BLD AUTO: 4.9 10E6/UL (ref 4.4–5.9)
RBC URINE: 0 /HPF
SODIUM SERPL-SCNC: 139 MMOL/L (ref 135–145)
SP GR UR STRIP: 1.01 (ref 1–1.03)
SYSTOLIC BLOOD PRESSURE - MUSE: NORMAL MMHG
T AXIS - MUSE: 83 DEGREES
UROBILINOGEN UR STRIP-MCNC: NORMAL MG/DL
VENTRICULAR RATE- MUSE: 89 BPM
WBC # BLD AUTO: 5.8 10E3/UL (ref 4–11)
WBC URINE: 0 /HPF

## 2024-09-03 PROCEDURE — 36415 COLL VENOUS BLD VENIPUNCTURE: CPT | Performed by: PATHOLOGY

## 2024-09-03 PROCEDURE — 93000 ELECTROCARDIOGRAM COMPLETE: CPT | Performed by: INTERNAL MEDICINE

## 2024-09-03 PROCEDURE — 99215 OFFICE O/P EST HI 40 MIN: CPT | Performed by: CLINICAL NURSE SPECIALIST

## 2024-09-03 PROCEDURE — 80053 COMPREHEN METABOLIC PANEL: CPT | Performed by: PATHOLOGY

## 2024-09-03 PROCEDURE — 71046 X-RAY EXAM CHEST 2 VIEWS: CPT | Mod: GC | Performed by: RADIOLOGY

## 2024-09-03 PROCEDURE — 81001 URINALYSIS AUTO W/SCOPE: CPT | Performed by: PATHOLOGY

## 2024-09-03 PROCEDURE — 85027 COMPLETE CBC AUTOMATED: CPT | Performed by: PATHOLOGY

## 2024-09-03 PROCEDURE — 83735 ASSAY OF MAGNESIUM: CPT | Performed by: PATHOLOGY

## 2024-09-03 PROCEDURE — 86900 BLOOD TYPING SEROLOGIC ABO: CPT

## 2024-09-03 PROCEDURE — 85610 PROTHROMBIN TIME: CPT | Performed by: PATHOLOGY

## 2024-09-03 PROCEDURE — 84134 ASSAY OF PREALBUMIN: CPT | Performed by: THORACIC SURGERY (CARDIOTHORACIC VASCULAR SURGERY)

## 2024-09-03 PROCEDURE — 99000 SPECIMEN HANDLING OFFICE-LAB: CPT | Performed by: PATHOLOGY

## 2024-09-03 PROCEDURE — 85730 THROMBOPLASTIN TIME PARTIAL: CPT | Performed by: PATHOLOGY

## 2024-09-03 RX ORDER — LIDOCAINE 40 MG/G
CREAM TOPICAL
Status: CANCELLED | OUTPATIENT
Start: 2024-09-03

## 2024-09-03 RX ORDER — SODIUM CHLORIDE, SODIUM LACTATE, POTASSIUM CHLORIDE, CALCIUM CHLORIDE 600; 310; 30; 20 MG/100ML; MG/100ML; MG/100ML; MG/100ML
INJECTION, SOLUTION INTRAVENOUS CONTINUOUS
Status: CANCELLED | OUTPATIENT
Start: 2024-09-03

## 2024-09-03 ASSESSMENT — ENCOUNTER SYMPTOMS
SEIZURES: 0
DYSRHYTHMIAS: 0
ORTHOPNEA: 1

## 2024-09-03 ASSESSMENT — PAIN SCALES - GENERAL: PAINLEVEL: NO PAIN (0)

## 2024-09-03 ASSESSMENT — LIFESTYLE VARIABLES: TOBACCO_USE: 1

## 2024-09-03 ASSESSMENT — NEW YORK HEART ASSOCIATION (NYHA) CLASSIFICATION: NYHA FUNCTIONAL CLASS: III

## 2024-09-03 NOTE — H&P
Pre-Operative H & P     CC:  Preoperative exam to assess for increased cardiopulmonary risk while undergoing surgery and anesthesia.    Date of Encounter: 9/3/2024  Primary Care Physician:  Fran Irwin     Reason for visit:   Encounter Diagnoses   Name Primary?    Preop examination Yes    Coronary artery disease        HPI  Scott Donato is a 54 year old male who presents for pre-operative H & P in preparation for  Procedure Information       Case: [id number] Date/Time: 09/10/24 0800    Procedures:       Insert intraaortic balloon pump (Chest)      coronary artery bypass graft, Transesophageal Echocardiogram (Chest)    Anesthesia type: General    Diagnosis: Coronary artery disease [I25.10]    Pre-op diagnosis: Coronary artery disease [I25.10]    Location:  OR  /  OR    Providers: Evan Giles MD          History is obtained from the patient and chart review    Patient who has been recently followed by Dr. Schmidt, after presenting to the ED with chest pain and exertional shortness of breath. Workup including TTE showed EF of 10%. Coronary angiogram showed severe 3V CAD, and cardiac MRI showed viable myocardium. (See full reports below).  He has now been counseled for above procedures.     He was originally evaluated in the [facility] on 7/23/2024 for an earlier surgery date, however this was rescheduled due to provider schedule.    The patient returns today in preson to the the Preop Assessment Center for updated history and physical.    Patient's history is otherwise significant for hyperlipidemia, asthma, ETOH abuse, in remission, and DM2 with complications.        Hx of abnormal bleeding or anti-platelet use: ASA 81 mg daily      Past Medical History  Past Medical History:   Diagnosis Date    Asthma     CAD (coronary artery disease)     Charcot foot due to diabetes mellitus (H)     Chronic kidney disease     DM2 (diabetes mellitus, type 2) (H)     Dyspnea on exertion      Former smoker     Heart failure with reduced ejection fraction, NYHA class III (H)     Hyperlipidemia LDL goal <100     Orthopnea        Past Surgical History  Past Surgical History:   Procedure Laterality Date    Amputation of foot Right 2023    APPENDECTOMY      COLONOSCOPY      IRRIGATION AND DEBRIDEMENT Left     foot       Prior to Admission Medications  Current Outpatient Medications   Medication Sig Dispense Refill    albuterol (PROAIR HFA/PROVENTIL HFA/VENTOLIN HFA) 108 (90 Base) MCG/ACT inhaler Inhale 2 puffs into the lungs every 4 hours as needed      aspirin 81 MG tablet Take 81 mg by mouth every morning      atorvastatin (LIPITOR) 40 MG tablet Take 40 mg by mouth at bedtime.      budesonide-formoterol (SYMBICORT) 80-4.5 MCG/ACT Inhaler Inhale 2 puffs into the lungs as needed      bumetanide (BUMEX) 1 MG tablet Take 1 mg by mouth 2 times daily      Calcium Carbonate-Vitamin D (OYSTER SHELL CALCIUM/D) 500-5 MG-MCG TABS Take 1 tablet by mouth daily      carvedilol (COREG) 6.25 MG tablet Take 6.25 mg by mouth 2 times daily (with meals)      cetirizine (ZYRTEC) 10 MG tablet Take 10 mg by mouth daily      DOXYCYCLINE HYCLATE PO Take 100 mg by mouth every morning      empagliflozin (JARDIANCE) 25 MG TABS tablet Take 25 mg by mouth every morning      EPINEPHrine (ANY BX GENERIC EQUIV) 0.3 MG/0.3ML injection 2-pack Inject 0.3 mg into the muscle as needed for anaphylaxis      eplerenone (INSPRA) 25 MG tablet Take 25 mg by mouth every morning.      OZEMPIC, 2 MG/DOSE, 8 MG/3ML pen Inject 2 mg subcutaneously every 7 days Fridays      sacubitril-valsartan (ENTRESTO) 49-51 MG per tablet Take 1 tablet by mouth 2 times daily         Allergies  Allergies   Allergen Reactions    Bee Venom Anaphylaxis       Social History  Social History     Socioeconomic History    Marital status: Single     Spouse name: Not on file    Number of children: Not on file    Years of education: Not on file    Highest education level: Not on  file   Occupational History    Not on file   Tobacco Use    Smoking status: Former     Current packs/day: 0.00     Types: Cigarettes     Quit date: 2018     Years since quittin.6    Smokeless tobacco: Never   Substance and Sexual Activity    Alcohol use: Yes     Comment: 2x a year    Drug use: Not Currently    Sexual activity: Not on file   Other Topics Concern    Not on file   Social History Narrative    Not on file     Social Determinants of Health     Financial Resource Strain: Low Risk  (3/13/2024)    Received from [facility]    Overall Financial Resource Strain (CARDIA)     Difficulty of Paying Living Expenses: Not hard at all   Food Insecurity: No Food Insecurity (3/13/2024)    Received from [facility]    Hunger Vital Sign     Worried About Running Out of Food in the Last Year: Never true     Ran Out of Food in the Last Year: Never true   Transportation Needs: No Transportation Needs (3/13/2024)    Received from [facility]    PRAPARE - Transportation     Lack of Transportation (Medical): No     Lack of Transportation (Non-Medical): No   Physical Activity: Not on File (12/15/2023)    Received from [facility]    Physical Activity     Physical Activity: 0   Stress: Not on File (12/15/2023)    Received from [facility]    Stress     Stress: 0   Social Connections: Not on File (12/15/2023)    Received from [facility]    Social Connections     Social Connections and Isolation: 0   Interpersonal Safety: Not At Risk (3/13/2024)    Received from [facility]    Humiliation, Afraid, Rape, and Kick questionnaire     Fear of Current or Ex-Partner: No     Emotionally Abused: No     Physically Abused: No     Sexually Abused: No   Housing Stability: Low Risk  (3/13/2024)    Received from [facility]    Housing Stability     What is your housing situation today?: 3 - I have housing       Family History  Family History   Problem Relation Age of Onset    Anesthesia Reaction Mother         Slow to  wake    Coronary Artery Disease Maternal Grandfather     Coronary Artery Disease Paternal Grandmother     Coronary Artery Disease Paternal Grandfather     Lung Cancer Paternal Grandfather     Substance Abuse Son     Alcoholism Maternal Aunt     Arthritis Maternal Aunt     Alcoholism Maternal Uncle     Clotting Disorder No family hx of     Bleeding Disorder No family hx of        Review of Systems  The complete review of systems is negative other than noted in the HPI or here.   Anesthesia Evaluation   Pt has had prior anesthetic. Type: General and MAC.    History of anesthetic complications  - .  patient feels dis-connected with fentanyl-lasted 2 days after surgery.    ROS/MED HX  ENT/Pulmonary: Comment: Hoarseness he attributes to fluid retention/congestion     (+)     JARAD risk factors, snores loudly,      vocal cord abnormalities -  Hoarseness,   tobacco use, Past use,    Intermittent, asthma  Treatment: Inhaler prn,              (-) recent URI   Neurologic:  - neg neurologic ROS  (-) no seizures and no CVA   Cardiovascular:     (+) Dyslipidemia - -  CAD -  - -   Taking blood thinners Pt has received instructions: Instructions Given to patient: remain on ASA up to DOS. CHF etiology: mixed ICCM & NICM Last EF: 10-14% date: 3/2024 NYHA classification: III. PALOMINO. orthopnea/PND,                     Previous cardiac testing   Echo: Date: 6/3/2024 Results:    Stress Test:  Date: Results:    ECG Reviewed:  Date: 9/3/24 prelim Results:  Sinus rhythm   Inferior infarct (cited on or before 23-Jul-2024)   Possible Anterior infarct (cited on or before 23-Jul-2024)     Cath:  Date: 3/14/2024 Results:   (-) arrhythmias   METS/Exercise Tolerance: 3 - Able to walk 1-2 blocks without stopping Comment: Activity is somewhat limited after right toes amputation with balance issues   Hematologic: Comments: Microhematuria     (-) history of blood clots and history of blood transfusion   Musculoskeletal: Comment: Osteomyelitis  Charcot  "arthropathy of left foot s/p transmetatarsal amputation        GI/Hepatic: Comment: TUMS PRN     (+) GERD, Other,                  Renal/Genitourinary:     (+) renal disease, type: CRI, Pt does not require dialysis,           Endo: Comment: Hx of previously uncontrolled DM    (+)  type II DM, Last HgA1c: 6.1, date: 3/2024, Not using insulin, - not using insulin pump. Normal glucose range: <150,  Diabetic complications: cardiac problems.             Psychiatric/Substance Use: Comment: Has cut back drinking to 2-3x yearly since 2020.  Previously heavy drinking on the weekends.     (+)   alcohol abuse      Infectious Disease:  - neg infectious disease ROS     Malignancy:  - neg malignancy ROS     Other:  - neg other ROS          /72 (BP Location: Right arm, Patient Position: Sitting, Cuff Size: Adult Regular)   Pulse 87   Temp 98.7  F (37.1  C) (Oral)   Resp 16   Ht 1.88 m (6' 2\")   Wt 107.6 kg (237 lb 4.8 oz)   SpO2 98%   BMI 30.47 kg/m      Physical Exam   Constitutional: Awake, alert, cooperative, no apparent distress, and appears stated age.  Eyes: Pupils equal, round and reactive to light, extra ocular muscles intact, sclera clear, conjunctiva normal.  HENT: Normocephalic, oral pharynx with moist mucus membranes, good dentition. No goiter appreciated.  Intermittent hoarseness of voice  Respiratory: Clear to auscultation bilaterally, no crackles or wheezing.  No cough or obvious dyspnea  Cardiovascular: Regular rate and rhythm, normal S1 and S2, and no murmur noted.  Carotids +2, no bruits. No edema. Palpable pulses to radial  DP and PT arteries.   GI: Normal bowel sounds, soft, non-distended, non-tender, no masses palpated, no hepatosplenomegaly.    Lymph/Hematologic: No cervical lymphadenopathy and no supraclavicular lymphadenopathy.  Genitourinary: Deferred  Skin: Warm and dry.  No rashes at anticipated surgical site.   Musculoskeletal: Full ROM of neck. There is no redness, warmth, or swelling of " the joints. Gross motor strength is normal.    Neurologic: Awake, alert, oriented to name, place and time. Cranial nerves II-XII are grossly intact. Gait is normal.   Neuropsychiatric: Calm, cooperative. Normal affect.     Prior Labs/Diagnostic Studies   All labs and imaging personally reviewed     EK/3/24 prelim Sinus rhythm   Inferior infarct (cited on or before 2024)   Possible Anterior infarct (cited on or before 2024)     Echocardiogram 6/3/24     SUMMARY     The estimated left ventricular ejection fraction is 21%.   Tricuspid regurgitation jet is not adequate for estimation of PA systolic   pressure.   Based on IVC geometry, the right atrial pressure is probably upper limit   of normal/mildly increased     Left ventricular enlargement .   Decreased left ventricular systolic performance, severe   Est Stroke volume 48 cc   Dilated cardiomyopathy .   Regional wall motion abnormality-inferolateral .     ADDITIONAL REMARKS     No significant valve disease   Left ventricular ejection fraction is improved when compared to the   previous study of 3/12/24   There appears to be interval resolution of the regional wall motion   abnormality involving the distal septum and apex---which appears to be (in   part) related to an apparent change in conduction. The prior study was   suggestive of a LBBB-type IVCD (which no longer appears to be present)     3/14/24 Coronary angiogram  Conclusions/Summary     Findings/Results:   Right heart catheterization -     Mean RA 7 mmHg   RV 39/10 mmHg   PA 43/17 mmHg; mean 31 mm Hg   PCWP 22-23 mmHg     Cardiac output 3.5 L/min by thermodilution method (cardiac index 1.5   L/min/m^2)   Cardiac output 4.4 L/min by estimated Yaritza method (PA saturation 72 %,   Index 1.9 L/min/m^2)     Please find complete angiographic detail below   LVEDP following angiography is 24 mmHg     Impression and recommendation:   Normal right-sided filling pressures; RA mean 7 mmHg   Mild  pulmonary hypertension, predominantly secondary, with PA mean of 31   mmHg and PVR of approximately 2 Wood units   Elevated left ventricular filling pressures with pulmonary capillary wedge   pressure of 23 mmHg and directly measured LVEDP of 24 mmHg   Cardiogenic shock with cardiac output of 3.5 L/min and cardiac index of   1.5 L/min/m^2 using thermodilution measurement (lab standard)   Diffuse three-vessel coronary artery disease with severe stenoses noted in   all 3 major coronary territories   In addition to initiation of evidence-based therapies for dilated   cardiomyopathy, heart team approach to revascularization should be   considered in the setting of severely reduced left ventricular ejection   fraction and diabetes mellitus. Distal RCA (rPDA and/or rPLA1), second   obtuse marginal, distal LAD, and first diagonal branch would be potential   targets for surgical revascularization.     Carotid US 7/23/24                                                         IMPRESSION:     1. RIGHT ICA: Normal.     2. LEFT ICA:  Less than 50% diameter narrowing by grayscale imaging  and sonographic velocity criteria.    CHEST X-RAY 9/3/24                                                                      IMPRESSION:  No acute cardiopulmonary findings.    MR Cardiac 3/15/24   Impression:     1) Severely reduced left ventricular ejection fraction, calculated EF 14%   2) Dilated LV cavity with asynchronous septal motion consistent with left bundle-branch block.   3) Transmural late gadolinium hyperenhancement involving the apical inferior wall, and subendocardial pattern of hyperenhancement involving about 50% of the mid inferolateral wall. Apart from the apical inferior wall, there is preserved myocardial viability in all other distributions.   4) Small pericardial effusion and bilateral pleural effusions.         Latest Ref Rng & Units 7/23/2024     8:55 AM   PFT   FVC L 4.45    FEV1 L 3.64    FVC% % 87    FEV1% % 91           The patient's records and results personally reviewed by this provider.     Outside records reviewed from: Care Everywhere    LAB/DIAGNOSTIC STUDIES TODAY:    Lab Results   Component Value Date    WBC 5.8 09/03/2024     Lab Results   Component Value Date    RBC 4.90 09/03/2024     Lab Results   Component Value Date    HGB 15.4 09/03/2024     Lab Results   Component Value Date    HCT 45.3 09/03/2024     Lab Results   Component Value Date    MCV 92 09/03/2024     Lab Results   Component Value Date    MCH 31.4 09/03/2024     Lab Results   Component Value Date    MCHC 34.0 09/03/2024     Lab Results   Component Value Date    RDW 13.1 09/03/2024     Lab Results   Component Value Date     09/03/2024     Last Comprehensive Metabolic Panel:  Sodium   Date Value Ref Range Status   09/03/2024 139 135 - 145 mmol/L Final     Potassium   Date Value Ref Range Status   09/03/2024 4.1 3.4 - 5.3 mmol/L Final     Chloride   Date Value Ref Range Status   09/03/2024 101 98 - 107 mmol/L Final     Carbon Dioxide (CO2)   Date Value Ref Range Status   09/03/2024 28 22 - 29 mmol/L Final     Anion Gap   Date Value Ref Range Status   09/03/2024 10 7 - 15 mmol/L Final     Glucose   Date Value Ref Range Status   09/03/2024 230 (H) 70 - 99 mg/dL Final     Urea Nitrogen   Date Value Ref Range Status   09/03/2024 27.3 (H) 6.0 - 20.0 mg/dL Final     Creatinine   Date Value Ref Range Status   09/03/2024 1.41 (H) 0.67 - 1.17 mg/dL Final     GFR Estimate   Date Value Ref Range Status   09/03/2024 59 (L) >60 mL/min/1.73m2 Final     Comment:     eGFR calculated using 2021 CKD-EPI equation.     Calcium   Date Value Ref Range Status   09/03/2024 9.0 8.8 - 10.4 mg/dL Final     Comment:     Reference intervals for this test were updated on 7/16/2024 to reflect our healthy population more accurately. There may be differences in the flagging of prior results with similar values performed with this method. Those prior results can be interpreted  in the context of the updated reference intervals.     Bilirubin Total   Date Value Ref Range Status   09/03/2024 0.6 <=1.2 mg/dL Final     Alkaline Phosphatase   Date Value Ref Range Status   09/03/2024 135 40 - 150 U/L Final     ALT   Date Value Ref Range Status   09/03/2024 22 0 - 70 U/L Final     AST   Date Value Ref Range Status   09/03/2024 26 0 - 45 U/L Final     INR 1.00  PTT 30  Type and screen    Assessment    Scott Donato is a 54 year old male seen as a PAC referral for risk assessment and optimization for anesthesia.    Plan/Recommendations  Pt will be optimized for the proposed procedure.  See below for details on the assessment, risk, and preoperative recommendations    Anesthesia concern: Reports feeling disconnected after his left foot surgery, he attributes to Fentanyl. Reports this feeling lasted 2 days. He reported that less was used during right foot surgery and he did well.     NEUROLOGY  - No history of TIA, CVA or seizure    -Post Op delirium risk factors:  High co-morbid index    ENT  - No current airway concerns.  Will need to be reassessed day of surgery.  Mallampati: II  TM: > 3    Hoarseness    CARDIAC  HLD. Atorvastatin at bedtime.  BP low normal range.  -HFrEF, new diagnosis  - Thought to be likely mixed ischemic and non-ischemic cardiomyopathy     EF: 6/3/24 21%;   3/13/24 cMRI: 14%;   3/12/24: 8%, rWMA     Diffuse 3 vessel coronary artery disease     - CHF - managed on entresto, Bumex and carvedilol      -Left bundle branch block-  TTE as of 6/3 suggests resolution of LBBB.     Patient denies chest pain, irregular, or dyspnea on exertion.  Since his last evaluation he has been in touch with his team, and seen in the ED for symptoms of congestion.  His Bumex dose was increased which improved symptoms.  Able to walk 2 blocks. His activity is somewhat limited by his history of amputation of right toes.  - METS (Metabolic Equivalents)  METS 3 - limited by foot condition  Patient CANNOT  "perform 4 METS exercise without symptoms              PULMONARY  Allergies. Zyrtec at HS.  Asthma, with prn use of inhalers. Encouraged to use inhaler and bring along on DOS.   Able to lie flat without difficulty  JARAD Medium Risk             Total Score: 3    JARAD: Snores loudly    JARAD: Over 50 ys old    JARAD: Male      - Tobacco History    History   Smoking Status    Former    Types: Cigarettes   Smokeless Tobacco    Never       GI   - GERD  Uses TUMS PRN    PONV Medium Risk  Total Score: 2           1 AN PONV: Patient is not a current smoker    1 AN PONV: Intended Post Op Opioids        /RENAL  - Baseline Creatinine  1.38      ENDOCRINE    - BMI: Estimated body mass index is 30.47 kg/m  as calculated from the following:    Height as of this encounter: 1.88 m (6' 2\").    Weight as of this encounter: 107.6 kg (237 lb 4.8 oz).  Obesity (BMI >30)  DIABETES MELLITUS II. Last A1C 6.1  Blood sugar monitored at home less than 150    - Charcot of feet- His feet are doing very well and have remained healed with no new open wounds or sores.   - Wearing his diabetic shoes and custom orthotics   - Status post amputation of right foot toes    HEME  VTE Low Risk 0.5%             Total Score: 2    VTE: Male      Denies personal or family history of blood clots  Denies personal or family history of bleeding disorder  Type and screen drawn today by service    MSK  Patient is NOT Frail             Total Score: 0        PSYCH  Substance abuse  - hx of ETOH use disorder  Previously a heavy weekend drinker.   Since 2020 maybe has ETOH 2-3x yearly.     Different anesthesia methods/types have been discussed with the patient, but they are aware that the final plan will be decided by the assigned anesthesia provider on the date of service.  Patient was discussed with Dr Mustafa    The patient is optimized for their procedure. AVS with information on surgery time/arrival time, meds and NPO status given by nursing staff. No further diagnostic " testing indicated.      On the day of service:     Prep time: 15 minutes  Visit time: 14 minutes  Documentation time: 13 minutes  ------------------------------------------  Total time: 42 minutes      TRACY Lawson CNS  Preoperative Assessment Center  [facility]  Phone: [phone]  Fax: [phone]

## 2024-09-03 NOTE — PATIENT INSTRUCTIONS
Preparing for Your Surgery      Name:  Scott Donato   MRN:  [id number]   :  1970   Today's Date:  9/3/2024       Arriving for surgery:  Surgery date:  09/10/2024  Arrival time:  5:30 am    Please come to:         [facility]    [address] [place]  [address]     [facility] ([facility] [place] Patient/Visitor Ramp is at [address]. Patients and visitors who self-park will receive the reduced hospital parking rate. If the Patient /Visitor Ramp is full, please follow the signs to the [employer] car park located at the main hospital entrance.      [employer] parking is available (24 hours/ 7 days a week)      Discounted parking pass options are available for patients and visitors. They can be purchased at the [employer] desk at the [facility] hospital entrance.     -    Stop at the security desk and they will direct surgery patients to the [facility] [phone]        - If you need directions, a wheelchair or an escort please stop at the Information/security desk in the lobby.     What can I eat or drink?  -  You may eat and drink normally up to 8 hours prior to arrival time. (Until 24 at 10 pm)  -  You may have clear liquids until 2 hours prior to arrival time. (Until 3:30 am)    Examples of clear liquids:  Water  Clear broth  Juices (apple, white grape, white cranberry  and cider) without pulp  Noncarbonated, powder based beverages  (lemonade and Mat-Aid)  Sodas (Sprite, 7-Up, ginger ale and seltzer)  Coffee or tea (without milk or cream)  Gatorade    -  No Alcohol or cannabis products for at least 24 hours before surgery.     Which medicines can I take?    Hold Multivitamins for 7 days before surgery.  Hold Supplements for 7 days before surgery.  Hold Ibuprofen (Advil, Motrin) for 1 day(s) before surgery--unless otherwise directed by surgeon.  Hold Naproxen (Aleve) for 4  days before surgery.    Hold Ozempic for at least 1 week before surgery --last dose on Friday 8/30/24    Take last dose aspirin on 9/9/24    Take last dose Jardiance on 9/6/24    Take last dose Entresto on 9/7/24      -  DO NOT take these medications the day of surgery:  Bumetanide (Bumex)  Calcium  Cetriizine  Eplerenone (Inspra)      -  PLEASE TAKE these medications the day of surgery:  Doxycycline   Inhalers per your routine   Carvedilol       How do I prepare myself?  - Please take 2 showers (one the night prior to surgery and one the morning of surgery) using Scrubcare or Hibiclens soap.    Use this soap only from the neck to your toes.     Leave the soap on your skin for one minute--then rinse thoroughly.      You may use your own shampoo and conditioner. No other hair products.   - Please remove all jewelry and body piercings.  - No lotions, deodorants or fragrance.  - No makeup or fingernail polish.   - Bring your ID and insurance card.    -If you use a CPAP machine, please bring the CPAP machine, tubing, and mask to hospital.    -If you have a Deep Brain Stimulator, Spinal Cord Stimulator, or any Neuro Stimulator device---you must bring the remote control to the hospital.      ALL PATIENTS GOING HOME THE SAME DAY OF SURGERY ARE REQUIRED TO HAVE A RESPONSIBLE ADULT TO DRIVE AND BE IN ATTENDANCE WITH THEM FOR 24 HOURS FOLLOWING SURGERY.    Covid testing policy as of 12/06/2022  Your surgeon will notify and schedule you for a COVID test if one is needed before surgery--please direct any questions or COVID symptoms to your surgeon      Questions or Concerns:    - For any questions regarding the day of surgery or your hospital stay, please contact the Pre Admission Nursing Office at [phone].       - If you have health changes between today and your surgery, please call your surgeon.   If you have questions about your medications, test results or have a change in your health status call Nusrat Mazariegos RN at  [phone] during regular business hours.       - For questions after surgery, please call your surgeons office.           Current Visitor Guidelines    You may have 2 visitors in the pre op area.    Visiting hours: 8 a.m. to 8:30 p.m.    Patients confirmed or suspected to have symptoms of COVID 19 or flu:     No visitors allowed for adult patients.   Children (under age 18) can have 1 named visitor.     People who are sick or showing symptoms of COVID 19 or flu:    Are not allowed to visit patients--we can only make exceptions in special situations.       Please follow these guidelines for your visit:          Please maintain social distance          Masking is optional--however at times you may be asked to wear a mask for the safety of yourself and others     Clean your hands with alcohol hand . Do this when you arrive at and leave the building and patient room,    And again after you touch your mask or anything in the room.     Go directly to and from the room you are visiting.     Stay in the patient s room during your visit. Limit going to other places in the hospital as much as possible     Leave bags and jackets at home or in the car.     For everyone s health, please don t come and go during your visit. That includes for smoking   during your visit.

## 2024-09-08 NOTE — H&P
CV ICU H&P  9/10/2024      CO-MORBIDITIES:   Patient Active Problem List   Diagnosis    Coronary artery disease       ASSESSMENT: Scott Donato is a 54 year old male with PMH of asthma, CAD, HLD, T2DM cb Charcot foot and CKD, and HFrEF, NYHA class III. Presents to [facility] for insertion of intraaortic balloon pump and CABG x4 (LIMA to LAD, SVG to D1, SVG to OM2, SVG to PDA) by Dr. Giles on 9/10/24.    Signout:  Airway: East intubation, easy   Access: R internal jugular MAC with [place] @ 52-53cm, L radial art line, R 18G PIV  Meds: 1000 mcg fentanyl, 0.5 mg dilaudid, insulin 6 units/hr, propofol 50 mcg/kg/hr, 0.02 epi, Sugammadex  I/O's: 415 cell saver, 2.9L crystalloid,  ml, 1 unit platelets  Goals: remove IABP if tolerates wean, MAP > 65, SBP < 140      PTA meds  -Albuterol inhaler prn  -ASA 81 mg every day  -Atorvastatin 40 mg at bedtime  -Budesonide-formoterol 80-4.5 mcg/act inhaler prn  -Bumex 1 mg bid  -Carvedilol 6.25 mg bid  -Cetirizine 10 mg every day  -Doxycycline 100 mg q am  -Empagliflozin 25 mg every day   -Eplerenone 25 mg q am  -Ozempic 2 mg subcutaneous q Friday  -Sacubitril-valsartan 49-51 bid    PLAN:  Neuro/ pain/ sedation:  #Acute Postoperative pain  #Sedation for mechanical ventilation  #ETOH abuse, in remission since 2020  - Monitor neurological status. Notify the MD for any acute changes in exam.  - Pain: fentanyl gtt. Scheduled tylenol. PRN tylenol, oxycodone, dilaudid.  - Sedation: propofol gtt  -RASS goal of -1 to 0. Wean as tolerated to goal  -Sedation vacation with neurologic assessment every day     Pulmonary care:   #Postoperative ventilation management  #Asthma  #Former smoker  FiO2 60% RR 16 Tv 450 PEEP 5   - Intubated, ventilated  - Titrate supplemental oxygen to maintain saturation above 92%.  - Pulmonary hygiene: Incentive spirometer every 15- 30 minutes when awake, flutter valve, C&DB  -PTA albuterol, budesonide-formoterol   - Hold PTA cetirizine  -PST bid, ABG  afterwards  -Daily CXR  -Post-op CXR, ABG    Cardiovascular:    #S/p CABG x 4 on 9/10/2024 by Dr. Jones  #History of 3V CAD  #HLD  #HFrEF, NYHA Class III  #Post-op vasoplegia  Pre echo: LV EF 35-30%, inferior RWMA  Post echo: LV EF 25% on epi, inferior RWMA improving, valves without issue  Recent echo on 3/24 with LVEF of 10-14%  - Monitor hemodynamic status.   - Goal MAP>65, SBP<140  - 4 chest tubes (2 pleural, 2 meds)  - Required shock x1 coming off CPB, currently on VVI backup rate of 50 in sinus rhythm  - Statin hold  - BB hold  - ASA: start tomorrow  - Epi gtt; wean as tolerated  - HOLD PTA: asa 81 mg, atorvastatin 40 mg, bumex 1 mg bid, carvedilol 6.25 mg bid, eplerenone 25 mg, sacubitril-valsartan 49-51 bid    GI care/ Nutrition:   #Concern for protein-calorie malnutrition  #GERD   - NPO until extubated. BS swallow evaluation after extubation.   - SLP consultation if fails BS swallow eval.  - Nutrition consult for FT placement and TF management if unable to be extubated on POD1. Appreciate recs.   - PPI (none PTA)  - Continue bowel regimen: prn miralax, senna  -Hold PTA OTC Tums  -Postop abdominal XR and hepatic panel    Renal/ Fluid Balance/ Electrolytes:   #Post-operative fluid status  #Post-op electrolyte derangements  #CKD  BL creat appears to be ~ 1.3-1.4  - Strict I/O, daily weights  - Avoid/limit nephrotoxins as able  - eLyte replacement protocol  - Replete lytes PRN per protocol    Endocrine:    #Stress induced hyperglycemia  #T2DM  #Obesity  Preop A1c 6.1 on 3/2024  - Insulin gtt  - Goal BG <180 for optimal healing  - Hold PTA empagliflozin 25 mg, ozempic 2 mg     ID/ Antibiotics:  #Stress induced leukocytosis  - To complete perioperative regimen  - Continue to monitor fever curve, WBC and inflammatory markers as appropriate    Heme:     #Stress induced leukocytosis  #Acute blood loss anemia  #Acute blood loss thrombocytopenia  No s/sx active bleeding  - Continue to monitor  - Post op CBC  - am  CBC     MSK/ Skin:  #Sternotomy  #Surgical Incision  #Osteomyelitis 2/2 Charcot foot sp L transmetatarsal amputation & TMA R foot 2023  - Sternal precautions  - Postoperative incision management per protocol  - PT/OT/CR    Prophylaxis:    - Mechanical prophylaxis for DVT  - Chemical DVT prophylaxis - start subcutaneous heparin tomorrow  - PPI    Lines/ tubes/ drains:  - 8.0 ETT - Placed 9/10 (Day #0)  - RIJ CVC, PA catheter - Placed 9/10 (Day #0)  - Arterial Line L Radial - Placed 9/10 (Day #0)  - CTs x4 (2 pleural, 2 meds) (Day #0)  - Lou - Placed 9/10 (Day #0)    Disposition:  - CVICU    Patient seen, findings and plan discussed with CVICU staff.    Gerson Ro MD  General Surgery, PGY-1  5:34 PM 09/10/24    ** For on call schedules and contact information, please refer to [facility] **     ====================================    HPI:   Scott Donato is a 54 year old male with PMH of asthma, CAD, HLD, T2DM cb Charcot foot and CKD, and HFrEF, NYHA class III. Patient initially presented to an ED for chest pain and exertional shortness of breath, with subsequent NICHOLAS showing an EF of 21%, coronary angio w/ 3V CAD, and cardiac MRI with viable myocardium.  Began to follow with Dr. Jones and presented for CABG w/ insertion of IABP on 9/10 by Dr. Jones.    PAST MEDICAL HISTORY:   Past Medical History:   Diagnosis Date    Asthma     CAD (coronary artery disease)     Charcot foot due to diabetes mellitus (H)     Chronic kidney disease     DM2 (diabetes mellitus, type 2) (H)     Dyspnea on exertion     Former smoker     Heart failure with reduced ejection fraction, NYHA class III (H)     Hyperlipidemia LDL goal <100     Orthopnea        PAST SURGICAL HISTORY:   Past Surgical History:   Procedure Laterality Date    Amputation of foot Right 2023    APPENDECTOMY      COLONOSCOPY      IRRIGATION AND DEBRIDEMENT Left     foot       FAMILY HISTORY:   Family History   Problem Relation Age of Onset    Anesthesia Reaction  Mother         Slow to wake    Coronary Artery Disease Maternal Grandfather     Coronary Artery Disease Paternal Grandmother     Coronary Artery Disease Paternal Grandfather     Lung Cancer Paternal Grandfather     Substance Abuse Son     Alcoholism Maternal Aunt     Arthritis Maternal Aunt     Alcoholism Maternal Uncle     Clotting Disorder No family hx of     Bleeding Disorder No family hx of        SOCIAL HISTORY:   Social History     Tobacco Use    Smoking status: Former     Current packs/day: 0.00     Types: Cigarettes     Quit date:      Years since quittin.6    Smokeless tobacco: Never   Substance Use Topics    Alcohol use: Yes     Comment: 2x a year         OBJECTIVE:   1. VITAL SIGNS:   Temp:  [97.7  F (36.5  C)] 97.7  F (36.5  C)  Pulse:  [87] 87  Resp:  [16] 16  BP: (110)/(72) 110/72  SpO2:  [99 %] 99 %    Resp: 16      2. INTAKE/ OUTPUT:   I/O last 3 completed shifts:  In: 950 [I.V.:800; IV Piggyback:150]  Out: 430 [Urine:430]       3. PHYSICAL EXAMINATION:     Intubated and sedated  Lungs clear to auscultation bilaterally, no wheezing, crackles or rales, chest wall rise equal and symmetric  Chest tubes at -20 suction, output serosanguinous, no air leak  Regular rate and rhythm, right radial pulse 2+, unable to palpate bilateral DP and PT pulses, R groin IABP in place 2:1  Abdomen soft, non tender, non distended    4. INVESTIGATIONS:   Arterial Blood Gases   Recent Labs   Lab 09/10/24  1615 09/10/24  1552 09/10/24  1514 09/10/24  1450   PH 7.37 7.38 7.37 7.37   PCO2 42 41 44 44   PO2 459* 192* 271* 293*   HCO3 24 24 25 25     Complete Blood Count   Recent Labs   Lab 09/10/24  1615 09/10/24  1602 09/10/24  1552 09/10/24  1514 09/10/24  1450   HGB 11.6*  --  12.2* 12.0* 11.7*   PLT  --  121*  --   --   --      Basic Metabolic Panel  Recent Labs   Lab 09/10/24  1615 09/10/24  1552 09/10/24  1514 09/10/24  1450 09/10/24  0742 09/10/24  0734    137 138 139   < > 140   POTASSIUM 4.9 5.8* 5.9*  5.5*   < > 4.3   CHLORIDE  --   --   --   --   --  104   CO2  --   --   --   --   --  23   BUN  --   --   --   --   --  24.2*   CR  --   --   --   --   --  1.29*   * 186* 185* 179*   < > 161*    < > = values in this interval not displayed.     Liver Function Tests  Recent Labs   Lab 09/10/24  1602   INR 1.53*       Pancreatic Enzymes  No lab results found in last 7 days.  Coagulation Profile  Recent Labs   Lab 09/10/24  1602   INR 1.53*   PTT 28       5. RADIOLOGY:   Recent Results (from the past 24 hour(s))   NICHOLAS with Report    Narrative    Margaux Barrios MD     9/10/2024  6:31 AM  Procedures     XR Surgery ZOE L/T 5 Min Fluoro w Stills    Narrative    This exam was marked as non-reportable because it will not be read by a   radiologist or a [place] non-radiologist provider.             =========================================

## 2024-09-09 ASSESSMENT — NEW YORK HEART ASSOCIATION (NYHA) CLASSIFICATION: NYHA FUNCTIONAL CLASS: III

## 2024-09-09 ASSESSMENT — ENCOUNTER SYMPTOMS
DYSRHYTHMIAS: 0
ORTHOPNEA: 1
SEIZURES: 0

## 2024-09-09 ASSESSMENT — LIFESTYLE VARIABLES: TOBACCO_USE: 1

## 2024-09-09 NOTE — ANESTHESIA PREPROCEDURE EVALUATION
Anesthesia Pre-Procedure Evaluation    Patient: Scott Donato   MRN: [id number] : 1970        Procedure : Procedure(s):  Insert intraaortic balloon pump  coronary artery bypass graft, Transesophageal Echocardiogram          Past Medical History:   Diagnosis Date    Asthma     CAD (coronary artery disease)     Charcot foot due to diabetes mellitus (H)     Chronic kidney disease     DM2 (diabetes mellitus, type 2) (H)     Dyspnea on exertion     Former smoker     Heart failure with reduced ejection fraction, NYHA class III (H)     Hyperlipidemia LDL goal <100     Orthopnea       Past Surgical History:   Procedure Laterality Date    Amputation of foot Right     APPENDECTOMY      COLONOSCOPY      IRRIGATION AND DEBRIDEMENT Left     foot      Allergies   Allergen Reactions    Bee Venom Anaphylaxis      Social History     Tobacco Use    Smoking status: Former     Current packs/day: 0.00     Types: Cigarettes     Quit date:      Years since quittin.6    Smokeless tobacco: Never   Substance Use Topics    Alcohol use: Yes     Comment: 2x a year      Wt Readings from Last 1 Encounters:   24 107.6 kg (237 lb 4.8 oz)        Anesthesia Evaluation   Pt has had prior anesthetic. Type: General and MAC.    History of anesthetic complications  - .  patient feels dis-connected with fentanyl-lasted 2 days after surgery.    ROS/MED HX  ENT/Pulmonary: Comment: Hoarseness he attributes to fluid retention/congestion     (+)     JARAD risk factors, snores loudly,      vocal cord abnormalities -  Hoarseness,   tobacco use, Past use,    Intermittent, asthma  Treatment: Inhaler prn,              (-) recent URI   Neurologic:  - neg neurologic ROS  (-) no seizures and no CVA   Cardiovascular: Comment: Cardiac MR 3/15/24:    Impression:     1) Severely reduced left ventricular ejection fraction, calculated EF 14%   2) Dilated LV cavity with asynchronous septal motion consistent with left bundle-branch block.   3)  Transmural late gadolinium hyperenhancement involving the apical inferior wall, and subendocardial pattern of hyperenhancement involving about 50% of the mid inferolateral wall. Apart from the apical inferior wall, there is preserved myocardial viability in all other distributions.   4) Small pericardial effusion and bilateral pleural effusions.       (+) Dyslipidemia - -  CAD -  - -   Taking blood thinners Pt has received instructions: Instructions Given to patient: remain on ASA up to DOS. CHF etiology: mixed ICCM & NICM Last EF: 10-14% date: 3/2024 NYHA classification: III. PALOMINO. orthopnea/PND,                     Previous cardiac testing   Echo: Date: 6/3/2024 Results:  SUMMARY     The estimated left ventricular ejection fraction is 21%.   Tricuspid regurgitation jet is not adequate for estimation of PA systolic   pressure.   Based on IVC geometry, the right atrial pressure is probably upper limit   of normal/mildly increased     Left ventricular enlargement .   Decreased left ventricular systolic performance, severe   Est Stroke volume 48 cc   Dilated cardiomyopathy .   Regional wall motion abnormality-inferolateral .     ADDITIONAL REMARKS     No significant valve disease   Left ventricular ejection fraction is improved when compared to the   previous study of 3/12/24   There appears to be interval resolution of the regional wall motion   abnormality involving the distal septum and apex---which appears to be (in   part) related to an apparent change in conduction. The prior study was   suggestive of a LBBB-type IVCD (which no longer appears to be present)       Stress Test:  Date: Results:    ECG Reviewed:  Date: 9/3/24 prelim Results:  Sinus rhythm   Inferior infarct (cited on or before 23-Jul-2024)   Possible Anterior infarct (cited on or before 23-Jul-2024)     Cath:  Date: 3/14/2024 Results:  Findings/Results:   Right heart catheterization -     Mean RA 7 mmHg   RV 39/10 mmHg   PA 43/17 mmHg; mean 31 mm Hg    PCWP 22-23 mmHg     Cardiac output 3.5 L/min by thermodilution method (cardiac index 1.5   L/min/m^2)   Cardiac output 4.4 L/min by estimated Yaritza method (PA saturation 72 %,   Index 1.9 L/min/m^2)     Please find complete angiographic detail below   LVEDP following angiography is 24 mmHg     Impression and recommendation:   Normal right-sided filling pressures; RA mean 7 mmHg   Mild pulmonary hypertension, predominantly secondary, with PA mean of 31   mmHg and PVR of approximately 2 Wood units   Elevated left ventricular filling pressures with pulmonary capillary wedge   pressure of 23 mmHg and directly measured LVEDP of 24 mmHg   Cardiogenic shock with cardiac output of 3.5 L/min and cardiac index of   1.5 L/min/m^2 using thermodilution measurement (lab standard)   Diffuse three-vessel coronary artery disease with severe stenoses noted in   all 3 major coronary territories   In addition to initiation of evidence-based therapies for dilated   cardiomyopathy, heart team approach to revascularization should be   considered in the setting of severely reduced left ventricular ejection   fraction and diabetes mellitus. Distal RCA (rPDA and/or rPLA1), second   obtuse marginal, distal LAD, and first diagonal branch would be potential   targets for surgical revascularization.   Findings discussed with the patient and cardiology consult team    (-) arrhythmias   METS/Exercise Tolerance: 3 - Able to walk 1-2 blocks without stopping Comment: Activity is somewhat limited after right toes amputation with balance issues   Hematologic: Comments: Microhematuria     (-) history of blood clots and history of blood transfusion   Musculoskeletal: Comment: Osteomyelitis  Charcot arthropathy of left foot s/p transmetatarsal amputation        GI/Hepatic: Comment: TUMS PRN     (+) GERD, Other,                  Renal/Genitourinary:     (+) renal disease, type: CRI, Pt does not require dialysis,           Endo: Comment: Hx of previously  "uncontrolled DM    (+)  type II DM, Last HgA1c: 6.1, date: 3/2024, Not using insulin, - not using insulin pump. Normal glucose range: <150,  Diabetic complications: cardiac problems.             Psychiatric/Substance Use: Comment: Has cut back drinking to 2-3x yearly since 2020.  Previously heavy drinking on the weekends.     (+)   alcohol abuse      Infectious Disease:  - neg infectious disease ROS     Malignancy:  - neg malignancy ROS     Other:  - neg other ROS          Physical Exam    Airway        Mallampati: II   TM distance: > 3 FB   Neck ROM: full   Mouth opening: > 3 cm    Respiratory Devices and Support         Dental       (+) Minor Abnormalities - some fillings, tiny chips      Cardiovascular   cardiovascular exam normal       Rhythm and rate: regular and normal     Pulmonary   pulmonary exam normal        breath sounds clear to auscultation           OUTSIDE LABS:  CBC:   Lab Results   Component Value Date    WBC 5.8 09/03/2024    WBC 5.8 07/23/2024    HGB 15.4 09/03/2024    HGB 15.8 07/23/2024    HCT 45.3 09/03/2024    HCT 47.4 07/23/2024     09/03/2024     07/23/2024     BMP:   Lab Results   Component Value Date     09/03/2024     07/23/2024    POTASSIUM 4.1 09/03/2024    POTASSIUM 4.1 07/23/2024    CHLORIDE 101 09/03/2024    CHLORIDE 100 07/23/2024    CO2 28 09/03/2024    CO2 30 (H) 07/23/2024    BUN 27.3 (H) 09/03/2024    BUN 26.4 (H) 07/23/2024    CR 1.41 (H) 09/03/2024    CR 1.38 (H) 07/23/2024     (H) 09/03/2024     (H) 07/23/2024     COAGS:   Lab Results   Component Value Date    PTT 30 09/03/2024    INR 1.00 09/03/2024     POC: No results found for: \"BGM\", \"HCG\", \"HCGS\"  HEPATIC:   Lab Results   Component Value Date    ALBUMIN 4.3 09/03/2024    PROTTOTAL 7.7 09/03/2024    ALT 22 09/03/2024    AST 26 09/03/2024    ALKPHOS 135 09/03/2024    BILITOTAL 0.6 09/03/2024     OTHER:   Lab Results   Component Value Date    ANITA 9.0 09/03/2024    MAG 2.1 09/03/2024 " "      Anesthesia Plan    ASA Status:  4    NPO Status:  NPO Appropriate    Anesthesia Type: General.     - Airway: ETT   Induction: Intravenous.   Maintenance: Balanced.   Techniques and Equipment:     - Lines/Monitors: Arterial Line, Central Line, PAC, CVP, BIS, NIRS, NICHOLAS            NICHOLAS Absolute Contra-indication: NONE            NICHOLAS Relative Contra-indication: NONE     - Blood: Cell Saver, T&S     - Drips/Meds: Norepi, Vasopressin, Epinephrine     Consents    Anesthesia Plan(s) and associated risks, benefits, and realistic alternatives discussed. Questions answered and patient/representative(s) expressed understanding.     - Discussed:     - Discussed with:  Patient      - Extended Intubation/Ventilatory Support Discussed: Yes.      - Patient is DNR/DNI Status: No     Use of blood products discussed: Yes.     - Discussed with: Patient.     - Consented: consented to blood products     Postoperative Care    Pain management: Multi-modal analgesia.   PONV prophylaxis: Dexamethasone or Solumedrol, Ondansetron (or other 5HT-3)     Comments:    Other Comments: 55yo M w/ PMH of hyperlipidemia, asthma, ETOH abuse, in remission, and DM2 with complications initially presented to the ED with chest pain and exertional shortness of breath. Workup including TTE showed EF of 10%. Coronary angiogram showed severe 3V CAD, and cardiac MRI showed viable myocardium. He presents to the OR for CABG today.    Plan for GETA w/ arterial line, CVC, PAC and NICHOLAS           Jose Manning MD    I have reviewed the pertinent notes and labs in the chart from the past 30 days and (re)examined the patient.  Any updates or changes from those notes are reflected in this note.             # Drug Induced Platelet Defect: home medication list includes an antiplatelet medication  # Obesity: Estimated body mass index is 30.47 kg/m  as calculated from the following:    Height as of 9/3/24: 1.88 m (6' 2\").    Weight as of 9/3/24: 107.6 kg (237 lb 4.8 " oz).

## 2024-09-10 ENCOUNTER — HOSPITAL ENCOUNTER (OUTPATIENT)
Dept: CARDIOLOGY | Facility: CLINIC | Age: 54
Discharge: HOME | End: 2024-09-10
Attending: STUDENT IN AN ORGANIZED HEALTH CARE EDUCATION/TRAINING PROGRAM

## 2024-09-10 ENCOUNTER — ANESTHESIA (OUTPATIENT)
Dept: SURGERY | Facility: CLINIC | Age: 54
DRG: 235 | End: 2024-09-10
Payer: COMMERCIAL

## 2024-09-10 ENCOUNTER — APPOINTMENT (OUTPATIENT)
Dept: GENERAL RADIOLOGY | Facility: CLINIC | Age: 54
DRG: 235 | End: 2024-09-10
Attending: THORACIC SURGERY (CARDIOTHORACIC VASCULAR SURGERY)
Payer: COMMERCIAL

## 2024-09-10 ENCOUNTER — APPOINTMENT (OUTPATIENT)
Dept: GENERAL RADIOLOGY | Facility: CLINIC | Age: 54
DRG: 235 | End: 2024-09-10
Payer: COMMERCIAL

## 2024-09-10 ENCOUNTER — HOSPITAL ENCOUNTER (INPATIENT)
Facility: CLINIC | Age: 54
LOS: 6 days | Discharge: HOME | DRG: 235 | End: 2024-09-16
Attending: THORACIC SURGERY (CARDIOTHORACIC VASCULAR SURGERY) | Admitting: ANESTHESIOLOGY
Payer: COMMERCIAL

## 2024-09-10 DIAGNOSIS — Z95.1 S/P CABG X 4: Primary | ICD-10-CM

## 2024-09-10 DIAGNOSIS — I25.10 CORONARY ARTERY DISEASE: ICD-10-CM

## 2024-09-10 LAB
ALBUMIN SERPL BCG-MCNC: 3.3 G/DL (ref 3.5–5.2)
ALBUMIN SERPL BCG-MCNC: 3.4 G/DL (ref 3.5–5.2)
ALLEN'S TEST: ABNORMAL
ALP SERPL-CCNC: 79 U/L (ref 40–150)
ALP SERPL-CCNC: 83 U/L (ref 40–150)
ALT SERPL W P-5'-P-CCNC: 15 U/L (ref 0–70)
ALT SERPL W P-5'-P-CCNC: 15 U/L (ref 0–70)
ANION GAP SERPL CALCULATED.3IONS-SCNC: 11 MMOL/L (ref 7–15)
ANION GAP SERPL CALCULATED.3IONS-SCNC: 13 MMOL/L (ref 7–15)
ANION GAP SERPL CALCULATED.3IONS-SCNC: 9 MMOL/L (ref 7–15)
APTT PPP: 28 SECONDS (ref 22–38)
APTT PPP: 32 SECONDS (ref 22–38)
APTT PPP: 34 SECONDS (ref 22–38)
AST SERPL W P-5'-P-CCNC: 44 U/L (ref 0–45)
AST SERPL W P-5'-P-CCNC: 48 U/L (ref 0–45)
BASE EXCESS BLDA CALC-SCNC: -0.3 MMOL/L (ref -3–3)
BASE EXCESS BLDA CALC-SCNC: -0.4 MMOL/L (ref -3–3)
BASE EXCESS BLDA CALC-SCNC: -0.8 MMOL/L (ref -3–3)
BASE EXCESS BLDA CALC-SCNC: -0.8 MMOL/L (ref -3–3)
BASE EXCESS BLDA CALC-SCNC: -1.1 MMOL/L (ref -3–3)
BASE EXCESS BLDA CALC-SCNC: 0.2 MMOL/L (ref -3–3)
BASE EXCESS BLDA CALC-SCNC: 0.5 MMOL/L (ref -3–3)
BASE EXCESS BLDA CALC-SCNC: 0.7 MMOL/L (ref -3–3)
BASE EXCESS BLDA CALC-SCNC: 1.4 MMOL/L (ref -3–3)
BASE EXCESS BLDA CALC-SCNC: 1.8 MMOL/L (ref -3–3)
BASE EXCESS BLDV CALC-SCNC: -0.6 MMOL/L (ref -3–3)
BASE EXCESS BLDV CALC-SCNC: 1.4 MMOL/L (ref -3–3)
BASE EXCESS BLDV CALC-SCNC: 2.2 MMOL/L (ref -3–3)
BILIRUB SERPL-MCNC: 0.6 MG/DL
BILIRUB SERPL-MCNC: 0.7 MG/DL
BLD PROD TYP BPU: NORMAL
BLOOD COMPONENT TYPE: NORMAL
BUN SERPL-MCNC: 21.2 MG/DL (ref 6–20)
BUN SERPL-MCNC: 21.9 MG/DL (ref 6–20)
BUN SERPL-MCNC: 24.2 MG/DL (ref 6–20)
CA-I BLD-MCNC: 4 MG/DL (ref 4.4–5.2)
CA-I BLD-MCNC: 4 MG/DL (ref 4.4–5.2)
CA-I BLD-MCNC: 4.2 MG/DL (ref 4.4–5.2)
CA-I BLD-MCNC: 4.4 MG/DL (ref 4.4–5.2)
CA-I BLD-MCNC: 4.4 MG/DL (ref 4.4–5.2)
CA-I BLD-MCNC: 4.6 MG/DL (ref 4.4–5.2)
CA-I BLD-MCNC: 4.9 MG/DL (ref 4.4–5.2)
CALCIUM SERPL-MCNC: 8.2 MG/DL (ref 8.8–10.4)
CALCIUM SERPL-MCNC: 8.4 MG/DL (ref 8.8–10.4)
CALCIUM SERPL-MCNC: 8.9 MG/DL (ref 8.8–10.4)
CHLORIDE SERPL-SCNC: 104 MMOL/L (ref 98–107)
CHLORIDE SERPL-SCNC: 107 MMOL/L (ref 98–107)
CHLORIDE SERPL-SCNC: 107 MMOL/L (ref 98–107)
CLOT INIT KAOL IND TO POST HEP NEUT TRTO: 1 {RATIO}
CLOT INIT KAOL IND TO POST HEP NEUT TRTO: 1.1 {RATIO}
CLOT INIT KAOLIN IND BLD US: 130 SEC (ref 113–166)
CLOT INIT KAOLIN IND BLD US: 131 SEC (ref 113–166)
CLOT INIT KAOLIN IND P HEP NEUT BLD US: 115 SEC (ref 103–153)
CLOT INIT KAOLIN IND P HEP NEUT BLD US: 136 SEC (ref 103–153)
CLOT STIFF PLT CONT BLD CALC: 13.7 HPA (ref 11.9–29.8)
CLOT STIFF PLT CONT BLD CALC: 15.1 HPA (ref 11.9–29.8)
CLOT STIFF TF IND P HEP NEUT BLD US: 15.3 HPA (ref 13–33.2)
CLOT STIFF TF IND P HEP NEUT BLD US: 17.1 HPA (ref 13–33.2)
CLOT STIFF TF IND+IIB-IIIA INH P HEP NEU: 1.6 HPA (ref 1–3.7)
CLOT STIFF TF IND+IIB-IIIA INH P HEP NEU: 2 HPA (ref 1–3.7)
CODING SYSTEM: NORMAL
COHGB MFR BLD: 97.7 % (ref 96–97)
COHGB MFR BLD: 97.9 % (ref 96–97)
COHGB MFR BLD: 98 % (ref 96–97)
CREAT SERPL-MCNC: 1.24 MG/DL (ref 0.67–1.17)
CREAT SERPL-MCNC: 1.25 MG/DL (ref 0.67–1.17)
CREAT SERPL-MCNC: 1.29 MG/DL (ref 0.67–1.17)
CROSSMATCH: NORMAL
CROSSMATCH: NORMAL
EGFRCR SERPLBLD CKD-EPI 2021: 65 ML/MIN/1.73M2
EGFRCR SERPLBLD CKD-EPI 2021: 68 ML/MIN/1.73M2
EGFRCR SERPLBLD CKD-EPI 2021: 69 ML/MIN/1.73M2
ERYTHROCYTE [DISTWIDTH] IN BLOOD BY AUTOMATED COUNT: 13.2 % (ref 10–15)
ERYTHROCYTE [DISTWIDTH] IN BLOOD BY AUTOMATED COUNT: 13.3 % (ref 10–15)
FIBRINOGEN PPP-MCNC: 213 MG/DL (ref 170–510)
FIBRINOGEN PPP-MCNC: 224 MG/DL (ref 170–510)
FIBRINOGEN PPP-MCNC: 235 MG/DL (ref 170–510)
GLUCOSE BLD-MCNC: 118 MG/DL (ref 70–99)
GLUCOSE BLD-MCNC: 118 MG/DL (ref 70–99)
GLUCOSE BLD-MCNC: 125 MG/DL (ref 70–99)
GLUCOSE BLD-MCNC: 149 MG/DL (ref 70–99)
GLUCOSE BLD-MCNC: 171 MG/DL (ref 70–99)
GLUCOSE BLD-MCNC: 179 MG/DL (ref 70–99)
GLUCOSE BLD-MCNC: 185 MG/DL (ref 70–99)
GLUCOSE BLD-MCNC: 186 MG/DL (ref 70–99)
GLUCOSE BLDC GLUCOMTR-MCNC: 111 MG/DL (ref 70–99)
GLUCOSE BLDC GLUCOMTR-MCNC: 127 MG/DL (ref 70–99)
GLUCOSE BLDC GLUCOMTR-MCNC: 129 MG/DL (ref 70–99)
GLUCOSE BLDC GLUCOMTR-MCNC: 140 MG/DL (ref 70–99)
GLUCOSE BLDC GLUCOMTR-MCNC: 140 MG/DL (ref 70–99)
GLUCOSE BLDC GLUCOMTR-MCNC: 147 MG/DL (ref 70–99)
GLUCOSE SERPL-MCNC: 135 MG/DL (ref 70–99)
GLUCOSE SERPL-MCNC: 161 MG/DL (ref 70–99)
GLUCOSE SERPL-MCNC: 185 MG/DL (ref 70–99)
HCO3 BLD-SCNC: 25 MMOL/L (ref 21–28)
HCO3 BLD-SCNC: 26 MMOL/L (ref 21–28)
HCO3 BLD-SCNC: 27 MMOL/L (ref 21–28)
HCO3 BLDA-SCNC: 24 MMOL/L (ref 21–28)
HCO3 BLDA-SCNC: 24 MMOL/L (ref 21–28)
HCO3 BLDA-SCNC: 25 MMOL/L (ref 21–28)
HCO3 BLDA-SCNC: 25 MMOL/L (ref 21–28)
HCO3 BLDA-SCNC: 26 MMOL/L (ref 21–28)
HCO3 BLDA-SCNC: 26 MMOL/L (ref 21–28)
HCO3 BLDA-SCNC: 27 MMOL/L (ref 21–28)
HCO3 BLDV-SCNC: 26 MMOL/L (ref 21–28)
HCO3 BLDV-SCNC: 27 MMOL/L (ref 21–28)
HCO3 BLDV-SCNC: 28 MMOL/L (ref 21–28)
HCO3 SERPL-SCNC: 22 MMOL/L (ref 22–29)
HCO3 SERPL-SCNC: 23 MMOL/L (ref 22–29)
HCO3 SERPL-SCNC: 24 MMOL/L (ref 22–29)
HCT VFR BLD AUTO: 35 % (ref 40–53)
HCT VFR BLD AUTO: 35.3 % (ref 40–53)
HGB BLD-MCNC: 11.2 G/DL (ref 13.3–17.7)
HGB BLD-MCNC: 11.2 G/DL (ref 13.3–17.7)
HGB BLD-MCNC: 11.6 G/DL (ref 13.3–17.7)
HGB BLD-MCNC: 11.7 G/DL (ref 13.3–17.7)
HGB BLD-MCNC: 11.7 G/DL (ref 13.3–17.7)
HGB BLD-MCNC: 12 G/DL (ref 13.3–17.7)
HGB BLD-MCNC: 12.2 G/DL (ref 13.3–17.7)
HGB BLD-MCNC: 14.7 G/DL (ref 13.3–17.7)
INR PPP: 1.19 (ref 0.85–1.15)
INR PPP: 1.3 (ref 0.85–1.15)
INR PPP: 1.53 (ref 0.85–1.15)
ISSUE DATE AND TIME: NORMAL
ISSUE DATE AND TIME: NORMAL
LACTATE BLD-SCNC: 0.8 MMOL/L (ref 0.7–2)
LACTATE BLD-SCNC: 1 MMOL/L (ref 0.7–2)
LACTATE BLD-SCNC: 1.1 MMOL/L (ref 0.7–2)
LACTATE BLD-SCNC: 1.4 MMOL/L (ref 0.7–2)
LACTATE BLD-SCNC: 1.5 MMOL/L (ref 0.7–2)
LACTATE SERPL-SCNC: 1.5 MMOL/L (ref 0.7–2)
MAGNESIUM SERPL-MCNC: 2.7 MG/DL (ref 1.7–2.3)
MCH RBC QN AUTO: 31.1 PG (ref 26.5–33)
MCH RBC QN AUTO: 31.5 PG (ref 26.5–33)
MCHC RBC AUTO-ENTMCNC: 34 G/DL (ref 31.5–36.5)
MCHC RBC AUTO-ENTMCNC: 34.3 G/DL (ref 31.5–36.5)
MCV RBC AUTO: 92 FL (ref 78–100)
MCV RBC AUTO: 92 FL (ref 78–100)
O2/TOTAL GAS SETTING VFR VENT: 100 %
O2/TOTAL GAS SETTING VFR VENT: 100 %
O2/TOTAL GAS SETTING VFR VENT: 50 %
O2/TOTAL GAS SETTING VFR VENT: 55 %
O2/TOTAL GAS SETTING VFR VENT: 60 %
O2/TOTAL GAS SETTING VFR VENT: 60 %
O2/TOTAL GAS SETTING VFR VENT: 80 %
O2/TOTAL GAS SETTING VFR VENT: 85 %
O2/TOTAL GAS SETTING VFR VENT: 85 %
OXYHGB MFR BLDA: 97 % (ref 92–100)
OXYHGB MFR BLDA: 97 % (ref 92–100)
OXYHGB MFR BLDA: 98 % (ref 92–100)
OXYHGB MFR BLDV: 70 % (ref 70–75)
OXYHGB MFR BLDV: 73 % (ref 70–75)
OXYHGB MFR BLDV: 79 % (ref 70–75)
PCO2 BLD: 38 MM HG (ref 35–45)
PCO2 BLD: 47 MM HG (ref 35–45)
PCO2 BLD: 50 MM HG (ref 35–45)
PCO2 BLDA: 41 MM HG (ref 35–45)
PCO2 BLDA: 42 MM HG (ref 35–45)
PCO2 BLDA: 44 MM HG (ref 35–45)
PCO2 BLDA: 45 MM HG (ref 35–45)
PCO2 BLDA: 46 MM HG (ref 35–45)
PCO2 BLDV: 43 MM HG (ref 40–50)
PCO2 BLDV: 50 MM HG (ref 40–50)
PCO2 BLDV: 51 MM HG (ref 40–50)
PEEP: 5 CM H2O
PEEP: 55 CM H2O
PH BLD: 7.34 [PH] (ref 7.35–7.45)
PH BLD: 7.34 [PH] (ref 7.35–7.45)
PH BLD: 7.44 [PH] (ref 7.35–7.45)
PH BLDA: 7.37 [PH] (ref 7.35–7.45)
PH BLDA: 7.38 [PH] (ref 7.35–7.45)
PH BLDV: 7.32 [PH] (ref 7.32–7.43)
PH BLDV: 7.36 [PH] (ref 7.32–7.43)
PH BLDV: 7.4 [PH] (ref 7.32–7.43)
PHOSPHATE SERPL-MCNC: 2 MG/DL (ref 2.5–4.5)
PLATELET # BLD AUTO: 121 10E3/UL (ref 150–450)
PLATELET # BLD AUTO: 127 10E3/UL (ref 150–450)
PLATELET # BLD AUTO: 151 10E3/UL (ref 150–450)
PO2 BLD: 158 MM HG (ref 80–105)
PO2 BLD: 164 MM HG (ref 80–105)
PO2 BLD: 187 MM HG (ref 80–105)
PO2 BLDA: 184 MM HG (ref 80–105)
PO2 BLDA: 192 MM HG (ref 80–105)
PO2 BLDA: 271 MM HG (ref 80–105)
PO2 BLDA: 293 MM HG (ref 80–105)
PO2 BLDA: 297 MM HG (ref 80–105)
PO2 BLDA: 297 MM HG (ref 80–105)
PO2 BLDA: 459 MM HG (ref 80–105)
PO2 BLDV: 38 MM HG (ref 25–47)
PO2 BLDV: 43 MM HG (ref 25–47)
PO2 BLDV: 46 MM HG (ref 25–47)
POTASSIUM BLD-SCNC: 4.2 MMOL/L (ref 3.4–5.3)
POTASSIUM BLD-SCNC: 4.9 MMOL/L (ref 3.4–5.3)
POTASSIUM BLD-SCNC: 5 MMOL/L (ref 3.4–5.3)
POTASSIUM BLD-SCNC: 5.1 MMOL/L (ref 3.4–5.3)
POTASSIUM BLD-SCNC: 5.2 MMOL/L (ref 3.4–5.3)
POTASSIUM BLD-SCNC: 5.5 MMOL/L (ref 3.4–5.3)
POTASSIUM BLD-SCNC: 5.8 MMOL/L (ref 3.4–5.3)
POTASSIUM BLD-SCNC: 5.9 MMOL/L (ref 3.4–5.3)
POTASSIUM SERPL-SCNC: 4.3 MMOL/L (ref 3.4–5.3)
POTASSIUM SERPL-SCNC: 4.3 MMOL/L (ref 3.4–5.3)
POTASSIUM SERPL-SCNC: 4.8 MMOL/L (ref 3.4–5.3)
PROT SERPL-MCNC: 5.5 G/DL (ref 6.4–8.3)
PROT SERPL-MCNC: 5.7 G/DL (ref 6.4–8.3)
RBC # BLD AUTO: 3.81 10E6/UL (ref 4.4–5.9)
RBC # BLD AUTO: 3.86 10E6/UL (ref 4.4–5.9)
SAO2 % BLDA: 97 % (ref 92–100)
SAO2 % BLDA: 98 % (ref 96–97)
SAO2 % BLDA: 99 % (ref 96–97)
SAO2 % BLDV: 72.2 % (ref 70–75)
SAO2 % BLDV: 74.2 % (ref 70–75)
SAO2 % BLDV: 80 % (ref 70–75)
SODIUM BLD-SCNC: 137 MMOL/L (ref 135–145)
SODIUM BLD-SCNC: 137 MMOL/L (ref 135–145)
SODIUM BLD-SCNC: 138 MMOL/L (ref 135–145)
SODIUM BLD-SCNC: 139 MMOL/L (ref 135–145)
SODIUM SERPL-SCNC: 140 MMOL/L (ref 135–145)
UNIT ABO/RH: NORMAL
UNIT NUMBER: NORMAL
UNIT STATUS: NORMAL
UNIT TYPE ISBT: 5100
UNIT TYPE ISBT: 5100
UNIT TYPE ISBT: 8400
UNIT TYPE ISBT: 8400
WBC # BLD AUTO: 11.2 10E3/UL (ref 4–11)
WBC # BLD AUTO: 12 10E3/UL (ref 4–11)

## 2024-09-10 PROCEDURE — 999N000157 HC STATISTIC RCP TIME EA 10 MIN

## 2024-09-10 PROCEDURE — 06BQ4ZZ EXCISION OF LEFT SAPHENOUS VEIN, PERCUTANEOUS ENDOSCOPIC APPROACH: ICD-10-PCS | Performed by: THORACIC SURGERY (CARDIOTHORACIC VASCULAR SURGERY)

## 2024-09-10 PROCEDURE — 250N000009 HC RX 250: Performed by: STUDENT IN AN ORGANIZED HEALTH CARE EDUCATION/TRAINING PROGRAM

## 2024-09-10 PROCEDURE — 250N000013 HC RX MED GY IP 250 OP 250 PS 637: Performed by: THORACIC SURGERY (CARDIOTHORACIC VASCULAR SURGERY)

## 2024-09-10 PROCEDURE — 71045 X-RAY EXAM CHEST 1 VIEW: CPT | Mod: 26 | Performed by: RADIOLOGY

## 2024-09-10 PROCEDURE — 258N000003 HC RX IP 258 OP 636

## 2024-09-10 PROCEDURE — 272N000085 HC PACK CELL SAVER CSP: Performed by: THORACIC SURGERY (CARDIOTHORACIC VASCULAR SURGERY)

## 2024-09-10 PROCEDURE — 82805 BLOOD GASES W/O2 SATURATION: CPT

## 2024-09-10 PROCEDURE — 84100 ASSAY OF PHOSPHORUS: CPT | Performed by: THORACIC SURGERY (CARDIOTHORACIC VASCULAR SURGERY)

## 2024-09-10 PROCEDURE — 33967 INSERT I-AORT PERCUT DEVICE: CPT | Mod: GC | Performed by: THORACIC SURGERY (CARDIOTHORACIC VASCULAR SURGERY)

## 2024-09-10 PROCEDURE — 021209W BYPASS CORONARY ARTERY, THREE ARTERIES FROM AORTA WITH AUTOLOGOUS VENOUS TISSUE, OPEN APPROACH: ICD-10-PCS | Performed by: THORACIC SURGERY (CARDIOTHORACIC VASCULAR SURGERY)

## 2024-09-10 PROCEDURE — 84295 ASSAY OF SERUM SODIUM: CPT

## 2024-09-10 PROCEDURE — 999N000077 HC STATISTIC INSERT IABP

## 2024-09-10 PROCEDURE — 272N000766 HC IAB CATH AND INSERTION KIT, SENSATION

## 2024-09-10 PROCEDURE — 85610 PROTHROMBIN TIME: CPT | Performed by: THORACIC SURGERY (CARDIOTHORACIC VASCULAR SURGERY)

## 2024-09-10 PROCEDURE — C1898 LEAD, PMKR, OTHER THAN TRANS: HCPCS | Performed by: THORACIC SURGERY (CARDIOTHORACIC VASCULAR SURGERY)

## 2024-09-10 PROCEDURE — 360N000086 HC SURGERY LEVEL 6 W/ FLUORO, PER MIN: Performed by: THORACIC SURGERY (CARDIOTHORACIC VASCULAR SURGERY)

## 2024-09-10 PROCEDURE — 5A1221Z PERFORMANCE OF CARDIAC OUTPUT, CONTINUOUS: ICD-10-PCS | Performed by: THORACIC SURGERY (CARDIOTHORACIC VASCULAR SURGERY)

## 2024-09-10 PROCEDURE — 250N000009 HC RX 250: Performed by: THORACIC SURGERY (CARDIOTHORACIC VASCULAR SURGERY)

## 2024-09-10 PROCEDURE — 370N000017 HC ANESTHESIA TECHNICAL FEE, PER MIN: Performed by: THORACIC SURGERY (CARDIOTHORACIC VASCULAR SURGERY)

## 2024-09-10 PROCEDURE — 02100Z9 BYPASS CORONARY ARTERY, ONE ARTERY FROM LEFT INTERNAL MAMMARY, OPEN APPROACH: ICD-10-PCS | Performed by: THORACIC SURGERY (CARDIOTHORACIC VASCULAR SURGERY)

## 2024-09-10 PROCEDURE — P9037 PLATE PHERES LEUKOREDU IRRAD: HCPCS | Performed by: THORACIC SURGERY (CARDIOTHORACIC VASCULAR SURGERY)

## 2024-09-10 PROCEDURE — 82805 BLOOD GASES W/O2 SATURATION: CPT | Performed by: STUDENT IN AN ORGANIZED HEALTH CARE EDUCATION/TRAINING PROGRAM

## 2024-09-10 PROCEDURE — 85049 AUTOMATED PLATELET COUNT: CPT | Performed by: THORACIC SURGERY (CARDIOTHORACIC VASCULAR SURGERY)

## 2024-09-10 PROCEDURE — 86923 COMPATIBILITY TEST ELECTRIC: CPT | Performed by: THORACIC SURGERY (CARDIOTHORACIC VASCULAR SURGERY)

## 2024-09-10 PROCEDURE — 250N000011 HC RX IP 250 OP 636: Performed by: STUDENT IN AN ORGANIZED HEALTH CARE EDUCATION/TRAINING PROGRAM

## 2024-09-10 PROCEDURE — 250N000024 HC ISOFLURANE, PER MIN: Performed by: THORACIC SURGERY (CARDIOTHORACIC VASCULAR SURGERY)

## 2024-09-10 PROCEDURE — 999N000185 HC STATISTIC TRANSPORT TIME EA 15 MIN

## 2024-09-10 PROCEDURE — 258N000003 HC RX IP 258 OP 636: Performed by: STUDENT IN AN ORGANIZED HEALTH CARE EDUCATION/TRAINING PROGRAM

## 2024-09-10 PROCEDURE — 74018 RADEX ABDOMEN 1 VIEW: CPT | Mod: 26 | Performed by: RADIOLOGY

## 2024-09-10 PROCEDURE — 85396 CLOTTING ASSAY WHOLE BLOOD: CPT

## 2024-09-10 PROCEDURE — 258N000003 HC RX IP 258 OP 636: Performed by: THORACIC SURGERY (CARDIOTHORACIC VASCULAR SURGERY)

## 2024-09-10 PROCEDURE — 250N000009 HC RX 250

## 2024-09-10 PROCEDURE — 250N000011 HC RX IP 250 OP 636: Performed by: THORACIC SURGERY (CARDIOTHORACIC VASCULAR SURGERY)

## 2024-09-10 PROCEDURE — 85384 FIBRINOGEN ACTIVITY: CPT | Performed by: THORACIC SURGERY (CARDIOTHORACIC VASCULAR SURGERY)

## 2024-09-10 PROCEDURE — 83735 ASSAY OF MAGNESIUM: CPT | Performed by: THORACIC SURGERY (CARDIOTHORACIC VASCULAR SURGERY)

## 2024-09-10 PROCEDURE — 33519 CABG ARTERY-VEIN THREE: CPT | Mod: GC | Performed by: THORACIC SURGERY (CARDIOTHORACIC VASCULAR SURGERY)

## 2024-09-10 PROCEDURE — 999N000179 XR SURGERY CARM FLUORO LESS THAN 5 MIN W STILLS: Mod: TC

## 2024-09-10 PROCEDURE — C1760 CLOSURE DEV, VASC: HCPCS | Performed by: THORACIC SURGERY (CARDIOTHORACIC VASCULAR SURGERY)

## 2024-09-10 PROCEDURE — 250N000011 HC RX IP 250 OP 636

## 2024-09-10 PROCEDURE — 250N000013 HC RX MED GY IP 250 OP 250 PS 637: Performed by: STUDENT IN AN ORGANIZED HEALTH CARE EDUCATION/TRAINING PROGRAM

## 2024-09-10 PROCEDURE — 99233 SBSQ HOSP IP/OBS HIGH 50: CPT | Mod: GC | Performed by: ANESTHESIOLOGY

## 2024-09-10 PROCEDURE — P9047 ALBUMIN (HUMAN), 25%, 50ML: HCPCS

## 2024-09-10 PROCEDURE — 85384 FIBRINOGEN ACTIVITY: CPT | Performed by: STUDENT IN AN ORGANIZED HEALTH CARE EDUCATION/TRAINING PROGRAM

## 2024-09-10 PROCEDURE — 999N000259 HC STATISTIC EXTUBATION

## 2024-09-10 PROCEDURE — 82330 ASSAY OF CALCIUM: CPT

## 2024-09-10 PROCEDURE — 93005 ELECTROCARDIOGRAM TRACING: CPT

## 2024-09-10 PROCEDURE — C1725 CATH, TRANSLUMIN NON-LASER: HCPCS | Performed by: THORACIC SURGERY (CARDIOTHORACIC VASCULAR SURGERY)

## 2024-09-10 PROCEDURE — 85730 THROMBOPLASTIN TIME PARTIAL: CPT | Performed by: THORACIC SURGERY (CARDIOTHORACIC VASCULAR SURGERY)

## 2024-09-10 PROCEDURE — 36415 COLL VENOUS BLD VENIPUNCTURE: CPT | Performed by: CLINICAL NURSE SPECIALIST

## 2024-09-10 PROCEDURE — 999N000065 XR CHEST PORT 1 VIEW

## 2024-09-10 PROCEDURE — 272N000001 HC OR GENERAL SUPPLY STERILE: Performed by: THORACIC SURGERY (CARDIOTHORACIC VASCULAR SURGERY)

## 2024-09-10 PROCEDURE — 83605 ASSAY OF LACTIC ACID: CPT | Performed by: THORACIC SURGERY (CARDIOTHORACIC VASCULAR SURGERY)

## 2024-09-10 PROCEDURE — 85730 THROMBOPLASTIN TIME PARTIAL: CPT

## 2024-09-10 PROCEDURE — 85610 PROTHROMBIN TIME: CPT

## 2024-09-10 PROCEDURE — 85027 COMPLETE CBC AUTOMATED: CPT

## 2024-09-10 PROCEDURE — 94002 VENT MGMT INPAT INIT DAY: CPT

## 2024-09-10 PROCEDURE — 999N000075 HC STATISTIC IABP MONITORING

## 2024-09-10 PROCEDURE — 999N000065 XR ABDOMEN PORT 1 VIEW

## 2024-09-10 PROCEDURE — 33968 REMOVE AORTIC ASSIST DEVICE: CPT

## 2024-09-10 PROCEDURE — 84295 ASSAY OF SERUM SODIUM: CPT | Performed by: THORACIC SURGERY (CARDIOTHORACIC VASCULAR SURGERY)

## 2024-09-10 PROCEDURE — 85384 FIBRINOGEN ACTIVITY: CPT

## 2024-09-10 PROCEDURE — 84155 ASSAY OF PROTEIN SERUM: CPT

## 2024-09-10 PROCEDURE — 200N000002 HC R&B ICU UMMC

## 2024-09-10 PROCEDURE — 82330 ASSAY OF CALCIUM: CPT | Performed by: THORACIC SURGERY (CARDIOTHORACIC VASCULAR SURGERY)

## 2024-09-10 PROCEDURE — 33533 CABG ARTERIAL SINGLE: CPT | Mod: GC | Performed by: THORACIC SURGERY (CARDIOTHORACIC VASCULAR SURGERY)

## 2024-09-10 PROCEDURE — 82374 ASSAY BLOOD CARBON DIOXIDE: CPT | Performed by: CLINICAL NURSE SPECIALIST

## 2024-09-10 PROCEDURE — 999N000141 HC STATISTIC PRE-PROCEDURE NURSING ASSESSMENT: Performed by: THORACIC SURGERY (CARDIOTHORACIC VASCULAR SURGERY)

## 2024-09-10 PROCEDURE — 272N000088 HC PUMP APP ADULT PERFUSION: Performed by: THORACIC SURGERY (CARDIOTHORACIC VASCULAR SURGERY)

## 2024-09-10 PROCEDURE — 410N000004: Performed by: THORACIC SURGERY (CARDIOTHORACIC VASCULAR SURGERY)

## 2024-09-10 PROCEDURE — 5A02210 ASSISTANCE WITH CARDIAC OUTPUT USING BALLOON PUMP, CONTINUOUS: ICD-10-PCS | Performed by: THORACIC SURGERY (CARDIOTHORACIC VASCULAR SURGERY)

## 2024-09-10 PROCEDURE — 410N000003 HC PER-PERFUSION 1ST 30 MIN: Performed by: THORACIC SURGERY (CARDIOTHORACIC VASCULAR SURGERY)

## 2024-09-10 PROCEDURE — 5A1223Z PERFORMANCE OF CARDIAC PACING, CONTINUOUS: ICD-10-PCS | Performed by: THORACIC SURGERY (CARDIOTHORACIC VASCULAR SURGERY)

## 2024-09-10 RX ORDER — DEXMEDETOMIDINE HYDROCHLORIDE 4 UG/ML
.2-.7 INJECTION, SOLUTION INTRAVENOUS CONTINUOUS
Status: DISCONTINUED | OUTPATIENT
Start: 2024-09-10 | End: 2024-09-11

## 2024-09-10 RX ORDER — BUPIVACAINE HYDROCHLORIDE 2.5 MG/ML
INJECTION, SOLUTION EPIDURAL; INFILTRATION; INTRACAUDAL PRN
Status: DISCONTINUED | OUTPATIENT
Start: 2024-09-10 | End: 2024-09-10 | Stop reason: HOSPADM

## 2024-09-10 RX ORDER — HYDROMORPHONE HCL IN WATER/PF 6 MG/30 ML
0.2 PATIENT CONTROLLED ANALGESIA SYRINGE INTRAVENOUS
Status: DISCONTINUED | OUTPATIENT
Start: 2024-09-10 | End: 2024-09-13

## 2024-09-10 RX ORDER — LIDOCAINE 40 MG/G
CREAM TOPICAL
Status: DISCONTINUED | OUTPATIENT
Start: 2024-09-10 | End: 2024-09-10 | Stop reason: HOSPADM

## 2024-09-10 RX ORDER — PROCHLORPERAZINE MALEATE 5 MG
10 TABLET ORAL EVERY 6 HOURS PRN
Status: DISCONTINUED | OUTPATIENT
Start: 2024-09-10 | End: 2024-09-16 | Stop reason: HOSPADM

## 2024-09-10 RX ORDER — ACETAMINOPHEN 325 MG/1
975 TABLET ORAL ONCE
Status: COMPLETED | OUTPATIENT
Start: 2024-09-10 | End: 2024-09-10

## 2024-09-10 RX ORDER — HEPARIN SODIUM 1000 [USP'U]/ML
INJECTION, SOLUTION INTRAVENOUS; SUBCUTANEOUS PRN
Status: DISCONTINUED | OUTPATIENT
Start: 2024-09-10 | End: 2024-09-10

## 2024-09-10 RX ORDER — NICOTINE POLACRILEX 4 MG
15-30 LOZENGE BUCCAL
Status: DISCONTINUED | OUTPATIENT
Start: 2024-09-10 | End: 2024-09-12

## 2024-09-10 RX ORDER — OXYCODONE HYDROCHLORIDE 10 MG/1
10 TABLET ORAL EVERY 4 HOURS PRN
Status: DISCONTINUED | OUTPATIENT
Start: 2024-09-10 | End: 2024-09-16 | Stop reason: HOSPADM

## 2024-09-10 RX ORDER — NOREPINEPHRINE BITARTRATE 0.06 MG/ML
.01-.6 INJECTION, SOLUTION INTRAVENOUS CONTINUOUS
Status: DISCONTINUED | OUTPATIENT
Start: 2024-09-10 | End: 2024-09-10 | Stop reason: HOSPADM

## 2024-09-10 RX ORDER — FLUTICASONE FUROATE AND VILANTEROL 200; 25 UG/1; UG/1
1 POWDER RESPIRATORY (INHALATION) DAILY
Status: DISCONTINUED | OUTPATIENT
Start: 2024-09-11 | End: 2024-09-16 | Stop reason: HOSPADM

## 2024-09-10 RX ORDER — PHENYLEPHRINE HCL IN 0.9% NACL 50MG/250ML
.1-6 PLASTIC BAG, INJECTION (ML) INTRAVENOUS CONTINUOUS
Status: DISCONTINUED | OUTPATIENT
Start: 2024-09-10 | End: 2024-09-10 | Stop reason: HOSPADM

## 2024-09-10 RX ORDER — CHLORHEXIDINE GLUCONATE ORAL RINSE 1.2 MG/ML
15 SOLUTION DENTAL EVERY 12 HOURS
Status: DISCONTINUED | OUTPATIENT
Start: 2024-09-10 | End: 2024-09-11

## 2024-09-10 RX ORDER — CEFAZOLIN SODIUM/WATER 2 G/20 ML
2 SYRINGE (ML) INTRAVENOUS SEE ADMIN INSTRUCTIONS
Status: DISCONTINUED | OUTPATIENT
Start: 2024-09-10 | End: 2024-09-10 | Stop reason: HOSPADM

## 2024-09-10 RX ORDER — NALOXONE HYDROCHLORIDE 0.4 MG/ML
0.4 INJECTION, SOLUTION INTRAMUSCULAR; INTRAVENOUS; SUBCUTANEOUS
Status: DISCONTINUED | OUTPATIENT
Start: 2024-09-10 | End: 2024-09-16 | Stop reason: HOSPADM

## 2024-09-10 RX ORDER — LIDOCAINE HYDROCHLORIDE 10 MG/ML
INJECTION, SOLUTION INFILTRATION; PERINEURAL
Status: COMPLETED | OUTPATIENT
Start: 2024-09-10 | End: 2024-09-10

## 2024-09-10 RX ORDER — DEXTROSE MONOHYDRATE 25 G/50ML
25-50 INJECTION, SOLUTION INTRAVENOUS
Status: DISCONTINUED | OUTPATIENT
Start: 2024-09-10 | End: 2024-09-12

## 2024-09-10 RX ORDER — MILRINONE LACTATE 0.2 MG/ML
INJECTION, SOLUTION INTRAVENOUS CONTINUOUS PRN
Status: DISCONTINUED | OUTPATIENT
Start: 2024-09-10 | End: 2024-09-10

## 2024-09-10 RX ORDER — NALOXONE HYDROCHLORIDE 0.4 MG/ML
0.2 INJECTION, SOLUTION INTRAMUSCULAR; INTRAVENOUS; SUBCUTANEOUS
Status: DISCONTINUED | OUTPATIENT
Start: 2024-09-10 | End: 2024-09-16 | Stop reason: HOSPADM

## 2024-09-10 RX ORDER — SODIUM CHLORIDE, SODIUM LACTATE, POTASSIUM CHLORIDE, CALCIUM CHLORIDE 600; 310; 30; 20 MG/100ML; MG/100ML; MG/100ML; MG/100ML
INJECTION, SOLUTION INTRAVENOUS CONTINUOUS PRN
Status: DISCONTINUED | OUTPATIENT
Start: 2024-09-10 | End: 2024-09-10

## 2024-09-10 RX ORDER — POLYETHYLENE GLYCOL 3350 17 G/17G
17 POWDER, FOR SOLUTION ORAL DAILY
Status: DISCONTINUED | OUTPATIENT
Start: 2024-09-11 | End: 2024-09-12

## 2024-09-10 RX ORDER — SODIUM CHLORIDE, SODIUM LACTATE, POTASSIUM CHLORIDE, CALCIUM CHLORIDE 600; 310; 30; 20 MG/100ML; MG/100ML; MG/100ML; MG/100ML
INJECTION, SOLUTION INTRAVENOUS CONTINUOUS
Status: DISCONTINUED | OUTPATIENT
Start: 2024-09-10 | End: 2024-09-10 | Stop reason: HOSPADM

## 2024-09-10 RX ORDER — NICARDIPINE HCL-0.9% SOD CHLOR 1 MG/10 ML
SYRINGE (ML) INTRAVENOUS PRN
Status: DISCONTINUED | OUTPATIENT
Start: 2024-09-10 | End: 2024-09-10

## 2024-09-10 RX ORDER — HYDROMORPHONE HCL IN WATER/PF 6 MG/30 ML
0.4 PATIENT CONTROLLED ANALGESIA SYRINGE INTRAVENOUS
Status: DISCONTINUED | OUTPATIENT
Start: 2024-09-10 | End: 2024-09-13

## 2024-09-10 RX ORDER — ACETAMINOPHEN 325 MG/1
650 TABLET ORAL EVERY 4 HOURS PRN
Status: DISCONTINUED | OUTPATIENT
Start: 2024-09-13 | End: 2024-09-16 | Stop reason: HOSPADM

## 2024-09-10 RX ORDER — ONDANSETRON 2 MG/ML
4 INJECTION INTRAMUSCULAR; INTRAVENOUS EVERY 6 HOURS PRN
Status: DISCONTINUED | OUTPATIENT
Start: 2024-09-10 | End: 2024-09-16 | Stop reason: HOSPADM

## 2024-09-10 RX ORDER — ACETAMINOPHEN 325 MG/1
975 TABLET ORAL EVERY 8 HOURS
Status: COMPLETED | OUTPATIENT
Start: 2024-09-10 | End: 2024-09-13

## 2024-09-10 RX ORDER — LIDOCAINE HYDROCHLORIDE 20 MG/ML
INJECTION, SOLUTION INFILTRATION; PERINEURAL PRN
Status: DISCONTINUED | OUTPATIENT
Start: 2024-09-10 | End: 2024-09-10

## 2024-09-10 RX ORDER — CALCIUM GLUCONATE 20 MG/ML
1 INJECTION, SOLUTION INTRAVENOUS EVERY 6 HOURS PRN
Status: DISCONTINUED | OUTPATIENT
Start: 2024-09-10 | End: 2024-09-16 | Stop reason: HOSPADM

## 2024-09-10 RX ORDER — EPINEPHRINE IN 0.9 % SOD CHLOR 5 MG/250ML
.03-.1 PLASTIC BAG, INJECTION (ML) INTRAVENOUS CONTINUOUS
Status: DISCONTINUED | OUTPATIENT
Start: 2024-09-10 | End: 2024-09-12

## 2024-09-10 RX ORDER — ASPIRIN 81 MG/1
162 TABLET, CHEWABLE ORAL
Status: COMPLETED | OUTPATIENT
Start: 2024-09-10 | End: 2024-09-10

## 2024-09-10 RX ORDER — ASPIRIN 81 MG/1
81 TABLET, CHEWABLE ORAL
Status: COMPLETED | OUTPATIENT
Start: 2024-09-10 | End: 2024-09-10

## 2024-09-10 RX ORDER — CALCIUM CARBONATE 500 MG/1
500 TABLET, CHEWABLE ORAL 4 TIMES DAILY PRN
Status: DISCONTINUED | OUTPATIENT
Start: 2024-09-10 | End: 2024-09-16 | Stop reason: HOSPADM

## 2024-09-10 RX ORDER — LIDOCAINE 40 MG/G
CREAM TOPICAL
Status: DISCONTINUED | OUTPATIENT
Start: 2024-09-10 | End: 2024-09-16 | Stop reason: HOSPADM

## 2024-09-10 RX ORDER — CEFAZOLIN SODIUM/WATER 2 G/20 ML
2 SYRINGE (ML) INTRAVENOUS
Status: COMPLETED | OUTPATIENT
Start: 2024-09-10 | End: 2024-09-10

## 2024-09-10 RX ORDER — FENTANYL CITRATE-0.9 % NACL/PF 10 MCG/ML
PLASTIC BAG, INJECTION (ML) INTRAVENOUS PRN
Status: DISCONTINUED | OUTPATIENT
Start: 2024-09-10 | End: 2024-09-10

## 2024-09-10 RX ORDER — PROPOFOL 10 MG/ML
INJECTION, EMULSION INTRAVENOUS PRN
Status: DISCONTINUED | OUTPATIENT
Start: 2024-09-10 | End: 2024-09-10

## 2024-09-10 RX ORDER — DEXMEDETOMIDINE HYDROCHLORIDE 4 UG/ML
.1-1.2 INJECTION, SOLUTION INTRAVENOUS CONTINUOUS
Status: DISCONTINUED | OUTPATIENT
Start: 2024-09-10 | End: 2024-09-10 | Stop reason: HOSPADM

## 2024-09-10 RX ORDER — CHLORHEXIDINE GLUCONATE ORAL RINSE 1.2 MG/ML
10 SOLUTION DENTAL ONCE
Status: COMPLETED | OUTPATIENT
Start: 2024-09-10 | End: 2024-09-10

## 2024-09-10 RX ORDER — PROTAMINE SULFATE 10 MG/ML
INJECTION, SOLUTION INTRAVENOUS PRN
Status: DISCONTINUED | OUTPATIENT
Start: 2024-09-10 | End: 2024-09-10

## 2024-09-10 RX ORDER — HYDRALAZINE HYDROCHLORIDE 20 MG/ML
10 INJECTION INTRAMUSCULAR; INTRAVENOUS EVERY 30 MIN PRN
Status: DISCONTINUED | OUTPATIENT
Start: 2024-09-10 | End: 2024-09-16 | Stop reason: HOSPADM

## 2024-09-10 RX ORDER — FENTANYL CITRATE 50 UG/ML
INJECTION, SOLUTION INTRAMUSCULAR; INTRAVENOUS PRN
Status: DISCONTINUED | OUTPATIENT
Start: 2024-09-10 | End: 2024-09-10

## 2024-09-10 RX ORDER — AMOXICILLIN 250 MG
1 CAPSULE ORAL 2 TIMES DAILY
Status: DISCONTINUED | OUTPATIENT
Start: 2024-09-10 | End: 2024-09-12

## 2024-09-10 RX ORDER — ACETAMINOPHEN 650 MG/1
650 SUPPOSITORY RECTAL EVERY 4 HOURS PRN
Status: ACTIVE | OUTPATIENT
Start: 2024-09-10 | End: 2024-09-13

## 2024-09-10 RX ORDER — BISACODYL 10 MG
10 SUPPOSITORY, RECTAL RECTAL DAILY PRN
Status: DISCONTINUED | OUTPATIENT
Start: 2024-09-13 | End: 2024-09-16 | Stop reason: HOSPADM

## 2024-09-10 RX ORDER — ASPIRIN 81 MG/1
162 TABLET, CHEWABLE ORAL DAILY
Status: DISCONTINUED | OUTPATIENT
Start: 2024-09-10 | End: 2024-09-16 | Stop reason: HOSPADM

## 2024-09-10 RX ORDER — CEFAZOLIN SODIUM 2 G/100ML
2 INJECTION, SOLUTION INTRAVENOUS EVERY 8 HOURS
Status: COMPLETED | OUTPATIENT
Start: 2024-09-10 | End: 2024-09-11

## 2024-09-10 RX ORDER — NOREPINEPHRINE BITARTRATE 0.06 MG/ML
.01-.15 INJECTION, SOLUTION INTRAVENOUS CONTINUOUS
Status: DISCONTINUED | OUTPATIENT
Start: 2024-09-10 | End: 2024-09-11

## 2024-09-10 RX ORDER — CALCIUM GLUCONATE 20 MG/ML
2 INJECTION, SOLUTION INTRAVENOUS EVERY 6 HOURS PRN
Status: DISCONTINUED | OUTPATIENT
Start: 2024-09-10 | End: 2024-09-16 | Stop reason: HOSPADM

## 2024-09-10 RX ORDER — HEPARIN SODIUM 5000 [USP'U]/.5ML
5000 INJECTION, SOLUTION INTRAVENOUS; SUBCUTANEOUS EVERY 8 HOURS
Status: DISCONTINUED | OUTPATIENT
Start: 2024-09-11 | End: 2024-09-16 | Stop reason: HOSPADM

## 2024-09-10 RX ORDER — OXYCODONE HYDROCHLORIDE 5 MG/1
5 TABLET ORAL EVERY 4 HOURS PRN
Status: DISCONTINUED | OUTPATIENT
Start: 2024-09-10 | End: 2024-09-16 | Stop reason: HOSPADM

## 2024-09-10 RX ORDER — PROPOFOL 10 MG/ML
5-75 INJECTION, EMULSION INTRAVENOUS CONTINUOUS
Status: DISCONTINUED | OUTPATIENT
Start: 2024-09-10 | End: 2024-09-10

## 2024-09-10 RX ORDER — PANTOPRAZOLE SODIUM 40 MG/1
40 TABLET, DELAYED RELEASE ORAL DAILY
Status: DISCONTINUED | OUTPATIENT
Start: 2024-09-11 | End: 2024-09-12

## 2024-09-10 RX ORDER — GABAPENTIN 300 MG/1
300 CAPSULE ORAL
Status: COMPLETED | OUTPATIENT
Start: 2024-09-10 | End: 2024-09-10

## 2024-09-10 RX ORDER — ONDANSETRON 4 MG/1
4 TABLET, ORALLY DISINTEGRATING ORAL EVERY 6 HOURS PRN
Status: DISCONTINUED | OUTPATIENT
Start: 2024-09-10 | End: 2024-09-16 | Stop reason: HOSPADM

## 2024-09-10 RX ORDER — PROPOFOL 10 MG/ML
INJECTION, EMULSION INTRAVENOUS CONTINUOUS PRN
Status: DISCONTINUED | OUTPATIENT
Start: 2024-09-10 | End: 2024-09-10

## 2024-09-10 RX ORDER — EPINEPHRINE IN 0.9 % SOD CHLOR 5 MG/250ML
.01-.3 PLASTIC BAG, INJECTION (ML) INTRAVENOUS CONTINUOUS
Status: DISCONTINUED | OUTPATIENT
Start: 2024-09-10 | End: 2024-09-10

## 2024-09-10 RX ORDER — METHOCARBAMOL 750 MG/1
750 TABLET, FILM COATED ORAL EVERY 6 HOURS PRN
Status: DISCONTINUED | OUTPATIENT
Start: 2024-09-10 | End: 2024-09-16 | Stop reason: HOSPADM

## 2024-09-10 RX ORDER — FAMOTIDINE 20 MG/1
20 TABLET, FILM COATED ORAL
Status: COMPLETED | OUTPATIENT
Start: 2024-09-10 | End: 2024-09-10

## 2024-09-10 RX ORDER — EPINEPHRINE IN 0.9 % SOD CHLOR 5 MG/250ML
.01-.3 PLASTIC BAG, INJECTION (ML) INTRAVENOUS CONTINUOUS
Status: DISCONTINUED | OUTPATIENT
Start: 2024-09-10 | End: 2024-09-10 | Stop reason: HOSPADM

## 2024-09-10 RX ORDER — ALBUTEROL SULFATE 90 UG/1
2 AEROSOL, METERED RESPIRATORY (INHALATION) EVERY 4 HOURS PRN
Status: DISCONTINUED | OUTPATIENT
Start: 2024-09-10 | End: 2024-09-16 | Stop reason: HOSPADM

## 2024-09-10 RX ORDER — SODIUM CHLORIDE, SODIUM GLUCONATE, SODIUM ACETATE, POTASSIUM CHLORIDE AND MAGNESIUM CHLORIDE 526; 502; 368; 37; 30 MG/100ML; MG/100ML; MG/100ML; MG/100ML; MG/100ML
INJECTION, SOLUTION INTRAVENOUS CONTINUOUS PRN
Status: DISCONTINUED | OUTPATIENT
Start: 2024-09-10 | End: 2024-09-10

## 2024-09-10 RX ADMIN — PROTAMINE SULFATE 250 MG: 10 INJECTION, SOLUTION INTRAVENOUS at 15:46

## 2024-09-10 RX ADMIN — NOREPINEPHRINE BITARTRATE 6.4 MCG: 1 INJECTION, SOLUTION, CONCENTRATE INTRAVENOUS at 12:21

## 2024-09-10 RX ADMIN — INSULIN HUMAN 1 UNITS/HR: 1 INJECTION, SOLUTION INTRAVENOUS at 14:18

## 2024-09-10 RX ADMIN — CHLORHEXIDINE GLUCONATE 0.12% ORAL RINSE 15 ML: 1.2 LIQUID ORAL at 20:18

## 2024-09-10 RX ADMIN — Medication 2 G: at 11:40

## 2024-09-10 RX ADMIN — SODIUM CHLORIDE, SODIUM GLUCONATE, SODIUM ACETATE, POTASSIUM CHLORIDE AND MAGNESIUM CHLORIDE: 526; 502; 368; 37; 30 INJECTION, SOLUTION INTRAVENOUS at 16:45

## 2024-09-10 RX ADMIN — Medication 100 MCG: at 10:27

## 2024-09-10 RX ADMIN — Medication 10 MG: at 15:21

## 2024-09-10 RX ADMIN — FENTANYL CITRATE 100 MCG: 50 INJECTION INTRAMUSCULAR; INTRAVENOUS at 17:09

## 2024-09-10 RX ADMIN — NOREPINEPHRINE BITARTRATE 6.4 MCG: 1 INJECTION, SOLUTION, CONCENTRATE INTRAVENOUS at 12:06

## 2024-09-10 RX ADMIN — DEXMEDETOMIDINE HYDROCHLORIDE 0.3 MCG/KG/HR: 400 INJECTION INTRAVENOUS at 10:23

## 2024-09-10 RX ADMIN — PROPOFOL 50 MCG/KG/MIN: 10 INJECTION, EMULSION INTRAVENOUS at 18:37

## 2024-09-10 RX ADMIN — CEFAZOLIN SODIUM 2 G: 2 INJECTION, SOLUTION INTRAVENOUS at 23:46

## 2024-09-10 RX ADMIN — FENTANYL CITRATE 100 MCG: 50 INJECTION INTRAMUSCULAR; INTRAVENOUS at 12:10

## 2024-09-10 RX ADMIN — NOREPINEPHRINE BITARTRATE 0.03 MCG/KG/MIN: 0.06 INJECTION, SOLUTION INTRAVENOUS at 17:58

## 2024-09-10 RX ADMIN — GABAPENTIN 300 MG: 300 CAPSULE ORAL at 07:56

## 2024-09-10 RX ADMIN — SENNOSIDES AND DOCUSATE SODIUM 1 TABLET: 8.6; 5 TABLET ORAL at 20:18

## 2024-09-10 RX ADMIN — FENTANYL CITRATE 150 MCG: 50 INJECTION INTRAMUSCULAR; INTRAVENOUS at 17:01

## 2024-09-10 RX ADMIN — SODIUM CHLORIDE 7.5 G: 900 INJECTION, SOLUTION INTRAVENOUS at 11:55

## 2024-09-10 RX ADMIN — DEXMEDETOMIDINE HYDROCHLORIDE 0.2 MCG/KG/HR: 4 INJECTION, SOLUTION INTRAVENOUS at 17:57

## 2024-09-10 RX ADMIN — INSULIN HUMAN 6 UNITS/HR: 1 INJECTION, SOLUTION INTRAVENOUS at 17:54

## 2024-09-10 RX ADMIN — Medication 100 MCG: at 15:46

## 2024-09-10 RX ADMIN — Medication 2 G: at 15:39

## 2024-09-10 RX ADMIN — Medication 100 MG: at 11:10

## 2024-09-10 RX ADMIN — PROPOFOL 50 MG: 10 INJECTION, EMULSION INTRAVENOUS at 11:10

## 2024-09-10 RX ADMIN — MILRINONE LACTATE IN DEXTROSE 0.15 MCG/KG/MIN: 200 INJECTION, SOLUTION INTRAVENOUS at 11:32

## 2024-09-10 RX ADMIN — ACETAMINOPHEN 975 MG: 325 TABLET ORAL at 07:55

## 2024-09-10 RX ADMIN — LIDOCAINE HYDROCHLORIDE 2 ML: 10 INJECTION, SOLUTION INFILTRATION; PERINEURAL at 08:30

## 2024-09-10 RX ADMIN — Medication 100 MCG: at 16:00

## 2024-09-10 RX ADMIN — NOREPINEPHRINE BITARTRATE 6.4 MCG: 1 INJECTION, SOLUTION, CONCENTRATE INTRAVENOUS at 15:45

## 2024-09-10 RX ADMIN — PROPOFOL 40 MCG/KG/MIN: 10 INJECTION, EMULSION INTRAVENOUS at 16:02

## 2024-09-10 RX ADMIN — Medication 30 MG: at 14:46

## 2024-09-10 RX ADMIN — PROPOFOL 50 MG: 10 INJECTION, EMULSION INTRAVENOUS at 11:08

## 2024-09-10 RX ADMIN — Medication 50 MCG: at 13:20

## 2024-09-10 RX ADMIN — FAMOTIDINE 20 MG: 20 TABLET ORAL at 07:56

## 2024-09-10 RX ADMIN — EPINEPHRINE 0.03 MCG/KG/MIN: 1 INJECTION INTRAMUSCULAR; INTRAVENOUS; SUBCUTANEOUS at 17:55

## 2024-09-10 RX ADMIN — LIDOCAINE HYDROCHLORIDE 100 MG: 20 INJECTION, SOLUTION INFILTRATION; PERINEURAL at 11:08

## 2024-09-10 RX ADMIN — Medication 50 MG: at 12:15

## 2024-09-10 RX ADMIN — Medication 50 MCG/HR: at 18:14

## 2024-09-10 RX ADMIN — SODIUM CHLORIDE, SODIUM GLUCONATE, SODIUM ACETATE, POTASSIUM CHLORIDE AND MAGNESIUM CHLORIDE: 526; 502; 368; 37; 30 INJECTION, SOLUTION INTRAVENOUS at 11:50

## 2024-09-10 RX ADMIN — SUGAMMADEX 200 MG: 100 INJECTION, SOLUTION INTRAVENOUS at 17:36

## 2024-09-10 RX ADMIN — SODIUM CHLORIDE, POTASSIUM CHLORIDE, SODIUM LACTATE AND CALCIUM CHLORIDE: 600; 310; 30; 20 INJECTION, SOLUTION INTRAVENOUS at 10:14

## 2024-09-10 RX ADMIN — OXYCODONE HYDROCHLORIDE 10 MG: 10 TABLET ORAL at 23:24

## 2024-09-10 RX ADMIN — ONDANSETRON 4 MG: 2 INJECTION INTRAMUSCULAR; INTRAVENOUS at 23:02

## 2024-09-10 RX ADMIN — SODIUM CHLORIDE, SODIUM GLUCONATE, SODIUM ACETATE, POTASSIUM CHLORIDE AND MAGNESIUM CHLORIDE: 526; 502; 368; 37; 30 INJECTION, SOLUTION INTRAVENOUS at 11:05

## 2024-09-10 RX ADMIN — Medication 50 MCG: at 13:17

## 2024-09-10 RX ADMIN — FENTANYL CITRATE 250 MCG: 50 INJECTION INTRAMUSCULAR; INTRAVENOUS at 11:08

## 2024-09-10 RX ADMIN — SODIUM CHLORIDE 1.25 G/HR: 900 INJECTION, SOLUTION INTRAVENOUS at 12:58

## 2024-09-10 RX ADMIN — EPINEPHRINE 0.03 MCG/KG/MIN: 1 INJECTION INTRAMUSCULAR; INTRAVENOUS; SUBCUTANEOUS at 15:27

## 2024-09-10 RX ADMIN — ASPIRIN 81 MG CHEWABLE TABLET 162 MG: 81 TABLET CHEWABLE at 20:18

## 2024-09-10 RX ADMIN — FENTANYL CITRATE 250 MCG: 50 INJECTION INTRAMUSCULAR; INTRAVENOUS at 13:12

## 2024-09-10 RX ADMIN — HEPARIN SODIUM 43000 UNITS: 1000 INJECTION INTRAVENOUS; SUBCUTANEOUS at 13:10

## 2024-09-10 RX ADMIN — CHLORHEXIDINE GLUCONATE 0.12% ORAL RINSE 10 ML: 1.2 LIQUID ORAL at 07:59

## 2024-09-10 RX ADMIN — HYDROMORPHONE HYDROCHLORIDE 0.5 MG: 1 INJECTION, SOLUTION INTRAMUSCULAR; INTRAVENOUS; SUBCUTANEOUS at 15:48

## 2024-09-10 RX ADMIN — NOREPINEPHRINE BITARTRATE 0.03 MCG/KG/MIN: 0.06 INJECTION, SOLUTION INTRAVENOUS at 11:48

## 2024-09-10 RX ADMIN — FENTANYL CITRATE 150 MCG: 50 INJECTION INTRAMUSCULAR; INTRAVENOUS at 12:15

## 2024-09-10 RX ADMIN — Medication 100 MCG: at 15:48

## 2024-09-10 RX ADMIN — SODIUM CHLORIDE, POTASSIUM CHLORIDE, SODIUM LACTATE AND CALCIUM CHLORIDE 500 ML: 600; 310; 30; 20 INJECTION, SOLUTION INTRAVENOUS at 19:05

## 2024-09-10 RX ADMIN — PROPOFOL 50 MCG/KG/MIN: 10 INJECTION, EMULSION INTRAVENOUS at 17:57

## 2024-09-10 RX ADMIN — ASPIRIN 81 MG CHEWABLE TABLET 162 MG: 81 TABLET CHEWABLE at 07:58

## 2024-09-10 RX ADMIN — ACETAMINOPHEN 975 MG: 325 TABLET ORAL at 18:17

## 2024-09-10 RX ADMIN — Medication 50 MCG: at 13:24

## 2024-09-10 ASSESSMENT — ACTIVITIES OF DAILY LIVING (ADL)
ADLS_ACUITY_SCORE: 20
ADLS_ACUITY_SCORE: 20
ADLS_ACUITY_SCORE: 16
ADLS_ACUITY_SCORE: 20
ADLS_ACUITY_SCORE: 32
ADLS_ACUITY_SCORE: 20
ADLS_ACUITY_SCORE: 18
ADLS_ACUITY_SCORE: 32
ADLS_ACUITY_SCORE: 20
ADLS_ACUITY_SCORE: 32
ADLS_ACUITY_SCORE: 32
ADLS_ACUITY_SCORE: 20
ADLS_ACUITY_SCORE: 20
ADLS_ACUITY_SCORE: 32
ADLS_ACUITY_SCORE: 20

## 2024-09-10 NOTE — BRIEF OP NOTE
[facility]    Brief Operative Note    Pre-operative diagnosis: Coronary artery disease [I25.10]  Post-operative diagnosis Same as pre-operative diagnosis    Procedure: Insert intraaortic balloon pump, N/A - Chest  median sternomy, coronary artery bypass graft, Left internal mammary haverst, Left endoscopic saphenous emory havest, on cardiopulmonary bypass, Transesophageal Echocardiogram, N/A - Chest    Surgeon: Surgeons and Role:     * Evan Giles MD - Primary     * Jocy Ovalles PA-C - Assisting     * Kevan Patten MD - Resident - Assisting  Anesthesia: General   Estimated Blood Loss: 1000mL    Drains: 1 left pleural drain, 2 mediastinal drains,1 right pleural drain  Specimens: * No specimens in log *  Findings:   None.  Complications: None.  Implants: * No implants in log *    Ivon Ovalles PA-C  Cardiothoracic Surgery  Pager [phone]    4:50 PM September 10, 2024

## 2024-09-10 NOTE — ANESTHESIA POSTPROCEDURE EVALUATION
Patient: Scott Donato    Procedure: Procedure(s):  Insert intraaortic balloon pump  median sternomy, coronary artery bypass graft X 4, Left internal mammary haverst, Left endoscopic saphenous emory havest, on cardiopulmonary bypass, Transesophageal Echocardiogram, Right femoral balloon pump and right fluroscopy.       Anesthesia Type:  General    Note:  Disposition: ICU            ICU Sign Out: Anesthesiologist/ICU physician sign out WAS performed   Postop Pain Control: Uneventful            Sign Out: Well controlled pain   PONV: No   Neuro/Psych: Uneventful            Sign Out: Acceptable/Baseline neuro status   Airway/Respiratory: Uneventful            Sign Out: AIRWAY IN SITU/Resp. Support               Airway in situ/Resp. Support: ETT                 Reason: Planned Pre-op   CV/Hemodynamics: Uneventful            Sign Out: Acceptable CV status; No obvious hypovolemia; No obvious fluid overload   Other NRE: NONE   DID A NON-ROUTINE EVENT OCCUR? No           Last vitals:  Vitals:    09/10/24 0700 09/10/24 1730   BP: 110/72    Pulse: 87 91   Resp: 16    Temp: 36.5  C (97.7  F)    SpO2: 99% 100%       Electronically Signed By: Kellie Fung MD  September 10, 2024  5:41 PM

## 2024-09-10 NOTE — ANESTHESIA CARE TRANSFER NOTE
Patient: Scott Donato    Procedure: Procedure(s):  Insert intraaortic balloon pump  median sternomy, coronary artery bypass graft X 4, Left internal mammary haverst, Left endoscopic saphenous emory havest, on cardiopulmonary bypass, Transesophageal Echocardiogram, Right femoral balloon pump and right fluroscopy.       Diagnosis: Coronary artery disease [I25.10]  Diagnosis Additional Information: No value filed.    Anesthesia Type:   General     Note:    Oropharynx: endotracheal tube in place and ventilatory support  Level of Consciousness: iatrogenic sedation      Independent Airway: airway patency not satisfactory and stable  Dentition: dentition unchanged  Vital Signs Stable: post-procedure vital signs reviewed and stable  Report to RN Given: handoff report given  Patient transferred to: ICU    ICU Handoff: Call for PAUSE to initiate/utilize ICU HANDOFF, Identified Patient, Identified Responsible Provider, Reviewed the Pertinent Medical History, Discussed Surgical Course, Reviewed Intra-OP Anesthesia Management and Issues during Anesthesia, Set Expectations for Post Procedure Period and Allowed Opportunity for Questions and Acknowledgement of Understanding      Vitals:  Vitals Value Taken Time   /63    Temp     Pulse 93 09/10/24 1738   Resp 16 09/10/24 1738   SpO2 100 % 09/10/24 1738   Vitals shown include unfiled device data.    Electronically Signed By: TRACY Louise CRNA  September 10, 2024  5:38 PM

## 2024-09-10 NOTE — PROGRESS NOTES
[facility]         Intra-Aortic Balloon Pump Insertion:       Arrival Date: 9/10/2024  Arrival Time: 1020  Inserted at: [facility]    Insertion Date: 9/10/2024  Insertion Time: 1105  Insertion Locations: OR    Insertion Details:  Physician: Chan  Site: Right Femoral Artery  Catheter Size: 8 Fr.  Balloon Volume: 50 cc  Fiberoptic: YES  Sheath: YES  Position verified with: fluoroscopy    Initial Settings:  Mode: Auto        Machine #: [id number]      Kimi Peters RT  ECMO Specialist  9/10/2024 11:11 AM

## 2024-09-10 NOTE — ANESTHESIA PROCEDURE NOTES
Perioperative NICHOLAS Procedure Note    Staff -        Anesthesiologist:  Jose Manning MD       Resident/Fellow: Jordin Egan DO       Other Anesthesia Staff: Kellie Fung MD       Performed By: fellow, anesthesiologist and with fellow       Procedure performed by resident/fellow/CRNA in presence of a teaching physician.    Preanesthesia Checklist:  Patient identified, IV assessed, risks and benefits discussed, monitors and equipment assessed, procedure being performed at surgeon's request and anesthesia consent obtained.    NICHOLAS Probe Insertion    Probe Status PRE Insertion: NO obvious damage  Probe type:  Adult 3D  Bite block used:   Oral Airway  Insertion Technique: Jaw Lift  Insertion complications: None obvious  Billing Report:NICHOLAS report by Anesthesiologist (See Separate Report note)  Probe Status POST Removal: NO obvious damage    NICHOLAS Report  General Procedure Information  Fellow/Resident Interpretation: I personally reviewed the images and the fellow/resident interpretation.  I agree with the fellow/resident interpretation as amended by myself.   Images for this study have been archived.  Modalities: 2D, 3D, CW Doppler, PW Doppler and Color flow mapping  Diagnostic indications for NICHOLAS:   Cardiomyopathy, other  Echocardiographic and Doppler Measurements  Right Ventricle:  Cavity size normal.    Hypertrophy not present.   Thrombus not present.    Global function normal.     Left Ventricle:  Cavity size normal.    Hypertrophy not present.   Thrombus not present.   Global Function moderately impaired.   Ejection Fraction 30%.      Ventricular Regional Function:  1- Basal Anteroseptal:  hypokinetic  2- Basal Anterior:  hypokinetic  3- Basal Anterolateral:  hypokinetic  4- Basal Inferolateral:  hypokinetic  5- Basal Inferior:  hypokinetic  6- Basal Inferoseptal:  hypokinetic  7- Mid Anteroseptal:  hypokinetic  8- Mid Anterior:  hypokinetic  9- Mid Anterolateral:  hypokinetic  10- Mid Inferolateral:   hypokinetic  11- Mid Inferior:  hypokinetic  12- Mid Inferoseptal:  hypokinetic  13- Apical Anterior:  hypokinetic  14- Apical Lateral:  hypokinetic  15- Apical Inferior:  hypokinetic  16- Apical Septal:  hypokinetic  17- Frierson:  hypokinetic    Valves  Aortic Valve: Annulus normal.  Stenosis not present.  Area: 1.95 cm .  Regurgitation absent.  Leaflets normal.  Leaflet motions normal.    Mitral Valve: Annulus normal.  Stenosis not present.  Regurgitation +1.  Leaflets normal.  Leaflet motions normal.    Tricuspid Valve: Annulus normal.  Stenosis not present.  Regurgitation +1.  Leaflets normal.  Leaflet motions normal.    Pulmonic Valve: Annulus normal.  Stenosis not present.  Regurgitation absent.      Aorta: Ascending Aorta: Size normal.  Diameter 3.63 cm.  Dissection not present.  Plaque thickness less than 3 mm.  Mobile plaque not present.    Aortic Arch: Size normal.    Dissection not present.   Plaque thickness less than 3 mm.   Mobile plaque not present.    Descending Aorta: Size normal.    Dissection not present.   Plaque thickness less than 3 mm.   Mobile plaque not present.      Right Atrium:  Size normal.   Spontaneous echo contrast not present.   Thrombus not present.   Tumor not present.   Device not present.     Left Atrium: Size normal.  Spontaneous echo contrast not present.  Thrombus not present.  Tumor not present.  Device not present.    Left atrial appendage normal.     Atrial Septum: Intra-atrial septal morphology lipomatous hypertrophy.       Ventricular Septum: Intra-ventricular septum morphology normal.      Diastolic Function Measurements:  Diastolic Dysfunction Grade= I.  E=  ms.  A=  ms.  E/A Ratio= .  DT=  ms.  S/D= .  IVRT= ms.  Other Findings:   Pericardium:  normal. Pleural Effusion:  none. Pulmonary Arteries:  normal. Pulmonary Venous Flow:  normal. Cornoary sinus catheter present.    Post Intervention Findings  Procedure(s) performed:  CABG. Global function:  Unchanged. Regional wall  motion: Improved. Surgeon(s) notified of all postintervention findings: Yes.             Post Intervention comments: Post CPB: LVEF 25-30% post CPB with inferior hypokinesis slightly improved from pre CPB.  Normal RV function.  Mild MR.  No evidence of aortic dissection..    Echocardiogram Comments  Echocardiogram comments:   PRE-CPB:  Moderately reduced LV systolic function w/ LVEF 30%. Normal RV systolic function. Grade I DD. No RWMAs visualized. No AS or AI. Mild MR. No pericardial effusion. No aortic dissection. All findings communicated to surgical team..

## 2024-09-10 NOTE — ANESTHESIA PROCEDURE NOTES
Airway       Patient location during procedure: OR       Procedure Start/Stop Times: 9/10/2024 11:14 AM  Staff -        Anesthesiologist:  Jose Manning MD       Resident/Fellow: Margaux Barrios MD       Performed By: resident  Consent for Airway        Urgency: elective  Indications and Patient Condition       Indications for airway management: tete-procedural       Induction type:intravenous       Mask difficulty assessment: 2 - vent by mask + OA or adjuvant +/- NMBA    Final Airway Details       Final airway type: endotracheal airway       Successful airway: ETT - single and Oral  Endotracheal Airway Details        ETT size (mm): 8.0       Cuffed: yes       Successful intubation technique: direct laryngoscopy       DL Blade Type: MAC 4       Grade View of Cords: 1       Adjucts: stylet       Position: Right       Measured from: lips       Secured at (cm): 24       Bite block used: Molar    Post intubation assessment        Placement verified by: capnometry, equal breath sounds and chest rise        Number of attempts at approach: 3       Number of other approaches attempted: 0       Secured with: silk tape       Ease of procedure: easy       Dentition: Intact and Unchanged    Medication(s) Administered   Medication Administration Time: 9/10/2024 11:14 AM    Additional Comments       First attempt with MAC 3 and Grade 3 view due to floppy epiglottis. Second attempt with MAC 4 blade with G1V but unable to sustain view when hand removed from under head.  Dr. Manning attempted with MAC4 blade and successfully placed ETT.

## 2024-09-10 NOTE — OP NOTE
OPERATIVE DATE: 9/10/2024      PRE-OPERATIVE DIAGNOSIS:  1) Coronary artery disease  2) Heart failure with reduced ejection fraction  3) Diabetes mellitus  4) Asthma  5) Charcot foot  6) Smoker  7) Hyperlipidemia  8) Orthopnea      POST-OPERATIVE DIAGNOSIS:  1) Coronary artery disease  2) Heart failure with reduced ejection fraction  3) Diabetes mellitus  4) Asthma  5) Charcot foot  6) Smoker  7) Hyperlipidemia  8) Orthopnea      PROCEDURE:  PROCEDURE PERFORMED:  1.  Coronary artery bypass grafting x 4    - reversed saphenous vein graft to the right posterior descending coronary artery   - reversed saphenous vein graft to the obtuse marginal branch of the left circumflex coronary artery   - reversed saphenous vein graft to the diagonal branch of the left anterior descending coronary artery   - pedicled left internal mammary artery to left anterior descending coronary artery  2.  Endoscopic vein harvest of the greater saphenous vein from the left lower extremity.  3. Transesophageal echocardiogram  4. Right femoral intra-aortic balloon pump with ultrasound guidance and fluoroscopy    SURGEON: Evan Giles MD    ASSISTANT: Ivon Ovalles PA-C; Adriano Harrington MD    ANESTHESIA: GETA    ESTIMATED BLOOD LOSS: 1000cc    OPERATIVE FINDINGS:    1) The left internal mammary artery was 3 mm in diameter and had excellent flow.    2) The greater saphenous vein from the left lower extremity was 4-5 mm in diameter and suitable for conduit.    3) The ascending aorta was free of calcified plaque.    4) The right posterior descending coronary artery was 1.5 mm in diameter and free of disease at the site of anastomosis.   5) The obtuse marginal branch of the left circumflex coronary artery was 1.25 mm in diameter and free of disease at the site of anastomosis.   6) The diagonal branch of the left anterior descending coronary artery was 1.5 mm in  diameter and free of disease at the site anastomosis.    7) The left anterior  descending coronary artery 2 mm in diameter and free of disease at the site of anastomosis.    8) After reperfusion and defibrillation, sinus rhythm resumed.    9) Left ventricular function was 20% preoperatively and improved to 30% after bypass on low-dose inotropic support.        INDICATIONS:  YAEL MEAD is a 54 year old male admitted with coronary artery disease.  We were asked to evaluate for CABG.  Risks and benefits of the operation were explained to the patient and their family including, but not limited to, bleeding, infection, stroke and even death.  They understood these risks and agreed to proceed electively.    OPERATIVE REPORT:  The patient was transferred to the operating room and positioned supine on the OR table.  The bilateral groins were prepped and draped.  Pre-procedure timeout was performed.  The skin and subcutaneous tissue was anesthetized with 1% lidocaine.  Right femoral artery was accessed using ultrasound guidance.  A 50cc intra-aortic balloon pump was placed using fluoroscopy confirmation.  General anesthesia was initiated by the anesthesia team.  Endotracheal intubation and central venous access was performed by anesthesia.  The patients neck, chest, abdomen and bilateral lower extremities were clipped, prepped and draped in sterile fashion.  A pre-procedure time-out was performed confirming the correct patient, correct site and correct procedure.    Median sternotomy and lower leg incisions were made.  The left internal mammary artery and left greater saphenous vein were harvested for conduit.   The patient was heparinized with 400mg/kg IV heparin.  Ascending aorta and right atrial appendage were cannulated for bypass.  Cardiopulmonary bypass was initiated with good flows.  Antegrade and retrograde cardioplegia catheters were placed.  The ascending aorta was cross clamped.  The heart was arrested with 1000cc of antegrade cold blood cardioplegia.  An additional 300cc of retrograde  cardioplegia was administered.    The following grafts were constructed in end-to-side fashion using running 7-0 Prolene:    - A reversed saphenous vein graft was sewn to the proximal right posterior descending coronary artery  - A reversed saphenous vein graft was sewn to the mid obtuse marginal branch of the left circumflex coronary artery  - A reversed saphenous vein graft was sewn to the mid diagonal branch of the left anterior descending coronary artery.     The pedicled left internal mammary artery was sewn to the mid left anterior descending coronary artery in end-to-side fashion using running 8-0 Prolene.      The proximal anastomoses of the 3 vein grafts were constructed on the ascending aorta in end-to-side fashion using running 6-0 Prolene under a single crossclamp.      Retrograde hot shot was administered.  Aortic cross clamp was removed.  The heart was resuscitated.  IV calcium was administered.  Lungs were ventilated bilaterally.  The  patient was weaned from cardipulmonary bypass.  Remaining pump blood volume was returned via the arterial cannula.  Cannulas were removed and sites were made hemostatic with prolene sutures.  Heparin was reversed with protamine.  Mediastinal and pleural chest tubes were placed.  The sternum was approximated with stainless steel sternal wires.  The wound was closed using stratafix sutures.    The patient was then transferred from the operating bed to an ICU bed and transferred to the ICU in critical, but stable, condition.    All needle, sponge and instrument counts were correct at the end of the case.    Evan Giles  Cardiothoracic Surgery  Pager: [phone]

## 2024-09-10 NOTE — ANESTHESIA PROCEDURE NOTES
Arterial Line Procedure Note    Pre-Procedure   Staff -        Anesthesiologist:  Jose Manning MD       Resident/Fellow: Margaux Barrios MD       Performed By: resident       Location: OR       Pre-Anesthestic Checklist: patient identified, IV checked, risks and benefits discussed, informed consent, monitors and equipment checked, pre-op evaluation and at physician/surgeon's request  Timeout:       Correct Patient: Yes        Correct Procedure: Yes        Correct Site: Yes        Correct Position: Yes   Line Placement:   This line was placed Pre Induction starting at 9/10/2024 8:30 AM and ending at 9/10/2024 8:52 AM  Procedure   Procedure: arterial line       Diagnosis: hemodynamic monitoring       Laterality: left       Insertion Site: radial.  Sterile Prep        Standard elements of sterile barrier followed       Skin prep: Chloraprep  Insertion/Injection        Technique: ultrasound guided and Seldinger Technique        1. Ultrasound was used to evaluate the access site.       2. Artery evaluated via ultrasound for patency/adequacy.       3. Using real-time ultrasound the needle/catheter was observed entering the artery/vein.       Catheter Type/Size: 20 G, 12 cm  Narrative         Secured by: suture       Tegaderm dressing used.       Complications: None apparent,        Arterial waveform: Yes        IBP within 10% of NIBP: Yes

## 2024-09-10 NOTE — PROGRESS NOTES
CVTS:     ECMO specialist in room and placed IABP to stand-by.   Cleaned wound and removed right femoral IABP without immediate complication.   Had immediate flush bleed with pressure removed as expected.  Held manual pressure for 20 minutes and then Fem-stop applied with pressure set per protocol by RN.   Had right foot mottling and minimal/no doppler pulses found per RN during manual pressure.  No hematoma or masses visible before Fem-stop applied. Patient to lay flat for 6 hours. Per surgeon, do not start working towards extubation until duration of 6 hours has passed since iABP removal.    Ivon Ovalles PA-C  Cardiothoracic Surgery  Pager [phone]    6:21 PM September 10, 2024

## 2024-09-10 NOTE — ANESTHESIA PROCEDURE NOTES
Central Line/PA Catheter Placement    Pre-Procedure   Staff -        Anesthesiologist:  Jose Mannign MD       Resident/Fellow: Margaux Barrios MD       Performed By: resident       Location: OR       Pre-Anesthestic Checklist: patient identified, IV checked, site marked, risks and benefits discussed, informed consent, monitors and equipment checked, pre-op evaluation and at physician/surgeon's request  Timeout:       Correct Patient: Yes        Correct Procedure: Yes        Correct Site: Yes        Correct Position: Yes        Correct Laterality: Yes   Line Placement:   This line was placed Post Induction    Procedure   Procedure: central line       Diagnosis: hemodynamic monitoring and medication administration       Laterality: right       Insertion Site: internal jugular.       Patient Position: Trendelenburg  Sterile Prep        All elements of maximal sterile barrier technique followed       Patient Prep/Sterile Barriers: draped, hand hygiene, gloves , hat , mask , draped, gown, sterile gel and probe cover       Skin prep: Chloraprep  Insertion/Injection        Technique: ultrasound guided and Seldinger Technique        1. Ultrasound was used to evaluate the access site.       2. Vein evaluated via ultrasound for patency/adequacy.       3. Using real-time ultrasound the needle/catheter was observed entering the artery/vein.       Introducer Type: 9 Fr, 2-lumen MAC        Type: PA/CVC with Introducer       Catheter Size: 9 Fr       Catheter Length: 11.5       Number of Lumens: double lumen       PA Catheter Type: CCO         Appropriate RV, RA and PA waveforms noted:  Yes            Withdrawn and Locked at cm: 50            Balloon down at end of the procedure:   Narrative         Secured by: suture       Tegaderm and Biopatch dressing used.       Complications: None apparent,        blood aspirated from all lumens,        All lumens flushed: Yes       Verification method: Ultrasound and NICHOLAS

## 2024-09-10 NOTE — PROGRESS NOTES
[facility]         Intra-Aortic Balloon Pump Discontinuation:     IABP support discontinued 9/10/2024 at 1755.    Clarke Medina, RT  ECMO Specialist  9/10/2024 6:04 PM

## 2024-09-11 ENCOUNTER — APPOINTMENT (OUTPATIENT)
Dept: GENERAL RADIOLOGY | Facility: CLINIC | Age: 54
DRG: 235 | End: 2024-09-11
Payer: COMMERCIAL

## 2024-09-11 ENCOUNTER — APPOINTMENT (OUTPATIENT)
Dept: OCCUPATIONAL THERAPY | Facility: CLINIC | Age: 54
DRG: 235 | End: 2024-09-11
Payer: COMMERCIAL

## 2024-09-11 DIAGNOSIS — Z95.1 S/P CABG (CORONARY ARTERY BYPASS GRAFT): Primary | ICD-10-CM

## 2024-09-11 LAB
ACANTHOCYTES BLD QL SMEAR: ABNORMAL
ALBUMIN SERPL BCG-MCNC: 3.3 G/DL (ref 3.5–5.2)
ALBUMIN SERPL BCG-MCNC: 3.5 G/DL (ref 3.5–5.2)
ALLEN'S TEST: ABNORMAL
ALLEN'S TEST: YES
ALP SERPL-CCNC: 63 U/L (ref 40–150)
ALP SERPL-CCNC: 77 U/L (ref 40–150)
ALT SERPL W P-5'-P-CCNC: 13 U/L (ref 0–70)
ALT SERPL W P-5'-P-CCNC: 13 U/L (ref 0–70)
ANION GAP SERPL CALCULATED.3IONS-SCNC: 10 MMOL/L (ref 7–15)
ANION GAP SERPL CALCULATED.3IONS-SCNC: 9 MMOL/L (ref 7–15)
AST SERPL W P-5'-P-CCNC: 38 U/L (ref 0–45)
AST SERPL W P-5'-P-CCNC: 47 U/L (ref 0–45)
ATRIAL RATE - MUSE: 103 BPM
ATRIAL RATE - MUSE: 92 BPM
AUER BODIES BLD QL SMEAR: ABNORMAL
BASE EXCESS BLDA CALC-SCNC: -0.6 MMOL/L (ref -3–3)
BASE EXCESS BLDA CALC-SCNC: -1.4 MMOL/L (ref -3–3)
BASE EXCESS BLDV CALC-SCNC: -0.1 MMOL/L (ref -3–3)
BASE EXCESS BLDV CALC-SCNC: 0 MMOL/L (ref -3–3)
BASE EXCESS BLDV CALC-SCNC: 0.8 MMOL/L (ref -3–3)
BASE EXCESS BLDV CALC-SCNC: 1.1 MMOL/L (ref -3–3)
BASE EXCESS BLDV CALC-SCNC: 1.2 MMOL/L (ref -3–3)
BASE EXCESS BLDV CALC-SCNC: 2 MMOL/L (ref -3–3)
BASO STIPL BLD QL SMEAR: ABNORMAL
BASOPHILS # BLD AUTO: 0 10E3/UL (ref 0–0.2)
BASOPHILS NFR BLD AUTO: 0 %
BILIRUB SERPL-MCNC: 0.5 MG/DL
BILIRUB SERPL-MCNC: 0.6 MG/DL
BITE CELLS BLD QL SMEAR: ABNORMAL
BLISTER CELLS BLD QL SMEAR: ABNORMAL
BUN SERPL-MCNC: 22.8 MG/DL (ref 6–20)
BUN SERPL-MCNC: 22.9 MG/DL (ref 6–20)
BURR CELLS BLD QL SMEAR: ABNORMAL
CA-I BLD-MCNC: 4.5 MG/DL (ref 4.4–5.2)
CALCIUM SERPL-MCNC: 8.3 MG/DL (ref 8.8–10.4)
CALCIUM SERPL-MCNC: 8.3 MG/DL (ref 8.8–10.4)
CHLORIDE SERPL-SCNC: 104 MMOL/L (ref 98–107)
CHLORIDE SERPL-SCNC: 106 MMOL/L (ref 98–107)
COHGB MFR BLD: 97 % (ref 96–97)
COHGB MFR BLD: 97.1 % (ref 96–97)
CREAT SERPL-MCNC: 1.25 MG/DL (ref 0.67–1.17)
CREAT SERPL-MCNC: 1.26 MG/DL (ref 0.67–1.17)
DACRYOCYTES BLD QL SMEAR: ABNORMAL
DIASTOLIC BLOOD PRESSURE - MUSE: NORMAL MMHG
DIASTOLIC BLOOD PRESSURE - MUSE: NORMAL MMHG
EGFRCR SERPLBLD CKD-EPI 2021: 67 ML/MIN/1.73M2
EGFRCR SERPLBLD CKD-EPI 2021: 68 ML/MIN/1.73M2
ELLIPTOCYTES BLD QL SMEAR: ABNORMAL
EOSINOPHIL # BLD AUTO: 0 10E3/UL (ref 0–0.7)
EOSINOPHIL NFR BLD AUTO: 0 %
ERYTHROCYTE [DISTWIDTH] IN BLOOD BY AUTOMATED COUNT: 13.4 % (ref 10–15)
ERYTHROCYTE [DISTWIDTH] IN BLOOD BY AUTOMATED COUNT: 13.8 % (ref 10–15)
FRAGMENTS BLD QL SMEAR: ABNORMAL
GLUCOSE BLDC GLUCOMTR-MCNC: 121 MG/DL (ref 70–99)
GLUCOSE BLDC GLUCOMTR-MCNC: 125 MG/DL (ref 70–99)
GLUCOSE BLDC GLUCOMTR-MCNC: 127 MG/DL (ref 70–99)
GLUCOSE BLDC GLUCOMTR-MCNC: 128 MG/DL (ref 70–99)
GLUCOSE BLDC GLUCOMTR-MCNC: 139 MG/DL (ref 70–99)
GLUCOSE BLDC GLUCOMTR-MCNC: 150 MG/DL (ref 70–99)
GLUCOSE BLDC GLUCOMTR-MCNC: 156 MG/DL (ref 70–99)
GLUCOSE BLDC GLUCOMTR-MCNC: 157 MG/DL (ref 70–99)
GLUCOSE BLDC GLUCOMTR-MCNC: 159 MG/DL (ref 70–99)
GLUCOSE BLDC GLUCOMTR-MCNC: 165 MG/DL (ref 70–99)
GLUCOSE BLDC GLUCOMTR-MCNC: 168 MG/DL (ref 70–99)
GLUCOSE BLDC GLUCOMTR-MCNC: 173 MG/DL (ref 70–99)
GLUCOSE BLDC GLUCOMTR-MCNC: 177 MG/DL (ref 70–99)
GLUCOSE BLDC GLUCOMTR-MCNC: 185 MG/DL (ref 70–99)
GLUCOSE BLDC GLUCOMTR-MCNC: 189 MG/DL (ref 70–99)
GLUCOSE BLDC GLUCOMTR-MCNC: 191 MG/DL (ref 70–99)
GLUCOSE BLDC GLUCOMTR-MCNC: 192 MG/DL (ref 70–99)
GLUCOSE BLDC GLUCOMTR-MCNC: 197 MG/DL (ref 70–99)
GLUCOSE BLDC GLUCOMTR-MCNC: 198 MG/DL (ref 70–99)
GLUCOSE BLDC GLUCOMTR-MCNC: 213 MG/DL (ref 70–99)
GLUCOSE BLDC GLUCOMTR-MCNC: 222 MG/DL (ref 70–99)
GLUCOSE BLDC GLUCOMTR-MCNC: 225 MG/DL (ref 70–99)
GLUCOSE SERPL-MCNC: 180 MG/DL (ref 70–99)
GLUCOSE SERPL-MCNC: 219 MG/DL (ref 70–99)
HCO3 BLD-SCNC: 24 MMOL/L (ref 21–28)
HCO3 BLD-SCNC: 25 MMOL/L (ref 21–28)
HCO3 BLDV-SCNC: 27 MMOL/L (ref 21–28)
HCO3 BLDV-SCNC: 28 MMOL/L (ref 21–28)
HCO3 BLDV-SCNC: 28 MMOL/L (ref 21–28)
HCO3 SERPL-SCNC: 23 MMOL/L (ref 22–29)
HCO3 SERPL-SCNC: 23 MMOL/L (ref 22–29)
HCT VFR BLD AUTO: 30.6 % (ref 40–53)
HCT VFR BLD AUTO: 34.8 % (ref 40–53)
HGB BLD-MCNC: 10.3 G/DL (ref 13.3–17.7)
HGB BLD-MCNC: 11.7 G/DL (ref 13.3–17.7)
HGB C CRYSTALS: ABNORMAL
HOWELL-JOLLY BOD BLD QL SMEAR: ABNORMAL
IMM GRANULOCYTES # BLD: 0.1 10E3/UL
IMM GRANULOCYTES NFR BLD: 1 %
INTERPRETATION ECG - MUSE: NORMAL
INTERPRETATION ECG - MUSE: NORMAL
LACTATE SERPL-SCNC: 1.3 MMOL/L (ref 0.7–2)
LYMPHOCYTES # BLD AUTO: 1 10E3/UL (ref 0.8–5.3)
LYMPHOCYTES NFR BLD AUTO: 14 %
MAGNESIUM SERPL-MCNC: 2.1 MG/DL (ref 1.7–2.3)
MAGNESIUM SERPL-MCNC: 2.4 MG/DL (ref 1.7–2.3)
MCH RBC QN AUTO: 31.2 PG (ref 26.5–33)
MCH RBC QN AUTO: 31.4 PG (ref 26.5–33)
MCHC RBC AUTO-ENTMCNC: 33.6 G/DL (ref 31.5–36.5)
MCHC RBC AUTO-ENTMCNC: 33.7 G/DL (ref 31.5–36.5)
MCV RBC AUTO: 93 FL (ref 78–100)
MCV RBC AUTO: 93 FL (ref 78–100)
MONOCYTES # BLD AUTO: 0.7 10E3/UL (ref 0–1.3)
MONOCYTES NFR BLD AUTO: 9 %
NEUTROPHILS # BLD AUTO: 5.8 10E3/UL (ref 1.6–8.3)
NEUTROPHILS NFR BLD AUTO: 76 %
NEUTS HYPERSEG BLD QL SMEAR: ABNORMAL
NRBC # BLD AUTO: 0 10E3/UL
NRBC BLD AUTO-RTO: 0 /100
O2/TOTAL GAS SETTING VFR VENT: 21 %
OXYHGB MFR BLDV: 52 % (ref 70–75)
OXYHGB MFR BLDV: 59 % (ref 70–75)
OXYHGB MFR BLDV: 59 % (ref 70–75)
OXYHGB MFR BLDV: 60 % (ref 70–75)
OXYHGB MFR BLDV: 62 % (ref 70–75)
OXYHGB MFR BLDV: 67 % (ref 70–75)
P AXIS - MUSE: 50 DEGREES
P AXIS - MUSE: 57 DEGREES
PCO2 BLD: 44 MM HG (ref 35–45)
PCO2 BLD: 44 MM HG (ref 35–45)
PCO2 BLDV: 50 MM HG (ref 40–50)
PCO2 BLDV: 50 MM HG (ref 40–50)
PCO2 BLDV: 51 MM HG (ref 40–50)
PCO2 BLDV: 51 MM HG (ref 40–50)
PCO2 BLDV: 53 MM HG (ref 40–50)
PCO2 BLDV: 54 MM HG (ref 40–50)
PEEP: 0 CM H2O
PEEP: 0 CM H2O
PH BLD: 7.35 [PH] (ref 7.35–7.45)
PH BLD: 7.37 [PH] (ref 7.35–7.45)
PH BLDV: 7.31 [PH] (ref 7.32–7.43)
PH BLDV: 7.31 [PH] (ref 7.32–7.43)
PH BLDV: 7.34 [PH] (ref 7.32–7.43)
PH BLDV: 7.34 [PH] (ref 7.32–7.43)
PH BLDV: 7.35 [PH] (ref 7.32–7.43)
PH BLDV: 7.36 [PH] (ref 7.32–7.43)
PHOSPHATE SERPL-MCNC: 3.7 MG/DL (ref 2.5–4.5)
PHOSPHATE SERPL-MCNC: 3.9 MG/DL (ref 2.5–4.5)
PLAT MORPH BLD: ABNORMAL
PLATELET # BLD AUTO: 140 10E3/UL (ref 150–450)
PLATELET # BLD AUTO: 94 10E3/UL (ref 150–450)
PO2 BLD: 114 MM HG (ref 80–105)
PO2 BLD: 115 MM HG (ref 80–105)
PO2 BLDV: 29 MM HG (ref 25–47)
PO2 BLDV: 33 MM HG (ref 25–47)
PO2 BLDV: 33 MM HG (ref 25–47)
PO2 BLDV: 34 MM HG (ref 25–47)
PO2 BLDV: 36 MM HG (ref 25–47)
PO2 BLDV: 41 MM HG (ref 25–47)
POLYCHROMASIA BLD QL SMEAR: SLIGHT
POTASSIUM SERPL-SCNC: 4.2 MMOL/L (ref 3.4–5.3)
POTASSIUM SERPL-SCNC: 4.8 MMOL/L (ref 3.4–5.3)
PR INTERVAL - MUSE: 172 MS
PR INTERVAL - MUSE: 174 MS
PROT SERPL-MCNC: 5.7 G/DL (ref 6.4–8.3)
PROT SERPL-MCNC: 5.8 G/DL (ref 6.4–8.3)
QRS DURATION - MUSE: 90 MS
QRS DURATION - MUSE: 94 MS
QT - MUSE: 362 MS
QT - MUSE: 406 MS
QTC - MUSE: 474 MS
QTC - MUSE: 502 MS
R AXIS - MUSE: -31 DEGREES
R AXIS - MUSE: -57 DEGREES
RBC # BLD AUTO: 3.3 10E6/UL (ref 4.4–5.9)
RBC # BLD AUTO: 3.73 10E6/UL (ref 4.4–5.9)
RBC AGGLUT BLD QL: ABNORMAL
RBC MORPH BLD: ABNORMAL
ROULEAUX BLD QL SMEAR: ABNORMAL
SAO2 % BLDA: 96 % (ref 92–100)
SAO2 % BLDA: 96 % (ref 92–100)
SAO2 % BLDV: 53.6 % (ref 70–75)
SAO2 % BLDV: 60.5 % (ref 70–75)
SAO2 % BLDV: 60.9 % (ref 70–75)
SAO2 % BLDV: 61.9 % (ref 70–75)
SAO2 % BLDV: 63.5 % (ref 70–75)
SAO2 % BLDV: 68.8 % (ref 70–75)
SICKLE CELLS BLD QL SMEAR: ABNORMAL
SMUDGE CELLS BLD QL SMEAR: ABNORMAL
SODIUM SERPL-SCNC: 137 MMOL/L (ref 135–145)
SODIUM SERPL-SCNC: 138 MMOL/L (ref 135–145)
SPHEROCYTES BLD QL SMEAR: ABNORMAL
STOMATOCYTES BLD QL SMEAR: ABNORMAL
SYSTOLIC BLOOD PRESSURE - MUSE: NORMAL MMHG
SYSTOLIC BLOOD PRESSURE - MUSE: NORMAL MMHG
T AXIS - MUSE: 32 DEGREES
T AXIS - MUSE: 48 DEGREES
TARGETS BLD QL SMEAR: ABNORMAL
TOXIC GRANULES BLD QL SMEAR: ABNORMAL
VARIANT LYMPHS BLD QL SMEAR: ABNORMAL
VENTRICULAR RATE- MUSE: 103 BPM
VENTRICULAR RATE- MUSE: 92 BPM
WBC # BLD AUTO: 12.2 10E3/UL (ref 4–11)
WBC # BLD AUTO: 7.6 10E3/UL (ref 4–11)

## 2024-09-11 PROCEDURE — 250N000013 HC RX MED GY IP 250 OP 250 PS 637: Performed by: THORACIC SURGERY (CARDIOTHORACIC VASCULAR SURGERY)

## 2024-09-11 PROCEDURE — 99233 SBSQ HOSP IP/OBS HIGH 50: CPT | Mod: 24 | Performed by: ANESTHESIOLOGY

## 2024-09-11 PROCEDURE — 85025 COMPLETE CBC W/AUTO DIFF WBC: CPT | Performed by: STUDENT IN AN ORGANIZED HEALTH CARE EDUCATION/TRAINING PROGRAM

## 2024-09-11 PROCEDURE — 82805 BLOOD GASES W/O2 SATURATION: CPT

## 2024-09-11 PROCEDURE — 97166 OT EVAL MOD COMPLEX 45 MIN: CPT | Mod: GO

## 2024-09-11 PROCEDURE — 97535 SELF CARE MNGMENT TRAINING: CPT | Mod: GO

## 2024-09-11 PROCEDURE — 82040 ASSAY OF SERUM ALBUMIN: CPT | Performed by: STUDENT IN AN ORGANIZED HEALTH CARE EDUCATION/TRAINING PROGRAM

## 2024-09-11 PROCEDURE — 83735 ASSAY OF MAGNESIUM: CPT | Performed by: STUDENT IN AN ORGANIZED HEALTH CARE EDUCATION/TRAINING PROGRAM

## 2024-09-11 PROCEDURE — 258N000003 HC RX IP 258 OP 636

## 2024-09-11 PROCEDURE — 71045 X-RAY EXAM CHEST 1 VIEW: CPT

## 2024-09-11 PROCEDURE — 85027 COMPLETE CBC AUTOMATED: CPT

## 2024-09-11 PROCEDURE — 84100 ASSAY OF PHOSPHORUS: CPT | Performed by: STUDENT IN AN ORGANIZED HEALTH CARE EDUCATION/TRAINING PROGRAM

## 2024-09-11 PROCEDURE — 200N000002 HC R&B ICU UMMC

## 2024-09-11 PROCEDURE — 84155 ASSAY OF PROTEIN SERUM: CPT

## 2024-09-11 PROCEDURE — 71045 X-RAY EXAM CHEST 1 VIEW: CPT | Mod: 26 | Performed by: RADIOLOGY

## 2024-09-11 PROCEDURE — 82330 ASSAY OF CALCIUM: CPT | Performed by: THORACIC SURGERY (CARDIOTHORACIC VASCULAR SURGERY)

## 2024-09-11 PROCEDURE — 83605 ASSAY OF LACTIC ACID: CPT

## 2024-09-11 PROCEDURE — 250N000011 HC RX IP 250 OP 636: Performed by: THORACIC SURGERY (CARDIOTHORACIC VASCULAR SURGERY)

## 2024-09-11 PROCEDURE — 84075 ASSAY ALKALINE PHOSPHATASE: CPT

## 2024-09-11 PROCEDURE — 250N000009 HC RX 250: Performed by: THORACIC SURGERY (CARDIOTHORACIC VASCULAR SURGERY)

## 2024-09-11 PROCEDURE — 83735 ASSAY OF MAGNESIUM: CPT

## 2024-09-11 PROCEDURE — 250N000013 HC RX MED GY IP 250 OP 250 PS 637: Performed by: NURSE PRACTITIONER

## 2024-09-11 PROCEDURE — 97530 THERAPEUTIC ACTIVITIES: CPT | Mod: GO

## 2024-09-11 PROCEDURE — 250N000012 HC RX MED GY IP 250 OP 636 PS 637: Performed by: NURSE PRACTITIONER

## 2024-09-11 PROCEDURE — 250N000011 HC RX IP 250 OP 636

## 2024-09-11 PROCEDURE — 84100 ASSAY OF PHOSPHORUS: CPT

## 2024-09-11 RX ORDER — HYDROXYZINE HYDROCHLORIDE 25 MG/1
25 TABLET, FILM COATED ORAL EVERY 6 HOURS PRN
Status: DISCONTINUED | OUTPATIENT
Start: 2024-09-11 | End: 2024-09-16 | Stop reason: HOSPADM

## 2024-09-11 RX ORDER — ATORVASTATIN CALCIUM 40 MG/1
40 TABLET, FILM COATED ORAL AT BEDTIME
Status: DISCONTINUED | OUTPATIENT
Start: 2024-09-11 | End: 2024-09-16 | Stop reason: HOSPADM

## 2024-09-11 RX ORDER — HYDROXYZINE HYDROCHLORIDE 25 MG/1
TABLET, FILM COATED ORAL
Status: COMPLETED
Start: 2024-09-11 | End: 2024-09-11

## 2024-09-11 RX ADMIN — ASPIRIN 81 MG CHEWABLE TABLET 162 MG: 81 TABLET CHEWABLE at 07:53

## 2024-09-11 RX ADMIN — SENNOSIDES AND DOCUSATE SODIUM 1 TABLET: 8.6; 5 TABLET ORAL at 07:53

## 2024-09-11 RX ADMIN — INSULIN HUMAN 9 UNITS/HR: 1 INJECTION, SOLUTION INTRAVENOUS at 10:20

## 2024-09-11 RX ADMIN — HYDROXYZINE HYDROCHLORIDE 25 MG: 25 TABLET, FILM COATED ORAL at 18:24

## 2024-09-11 RX ADMIN — OXYCODONE HYDROCHLORIDE 10 MG: 10 TABLET ORAL at 06:04

## 2024-09-11 RX ADMIN — HYDROXYZINE HYDROCHLORIDE 25 MG: 25 TABLET ORAL at 18:24

## 2024-09-11 RX ADMIN — PANTOPRAZOLE SODIUM 40 MG: 40 TABLET, DELAYED RELEASE ORAL at 07:53

## 2024-09-11 RX ADMIN — HEPARIN SODIUM 5000 UNITS: 5000 INJECTION, SOLUTION INTRAVENOUS; SUBCUTANEOUS at 19:33

## 2024-09-11 RX ADMIN — ACETAMINOPHEN 975 MG: 325 TABLET ORAL at 18:25

## 2024-09-11 RX ADMIN — POLYETHYLENE GLYCOL 3350 17 G: 17 POWDER, FOR SOLUTION ORAL at 07:53

## 2024-09-11 RX ADMIN — SENNOSIDES AND DOCUSATE SODIUM 1 TABLET: 8.6; 5 TABLET ORAL at 19:33

## 2024-09-11 RX ADMIN — CEFAZOLIN SODIUM 2 G: 2 INJECTION, SOLUTION INTRAVENOUS at 15:57

## 2024-09-11 RX ADMIN — ACETAMINOPHEN 975 MG: 325 TABLET ORAL at 03:14

## 2024-09-11 RX ADMIN — PROCHLORPERAZINE EDISYLATE 10 MG: 5 INJECTION INTRAMUSCULAR; INTRAVENOUS at 06:29

## 2024-09-11 RX ADMIN — CEFAZOLIN SODIUM 2 G: 2 INJECTION, SOLUTION INTRAVENOUS at 07:53

## 2024-09-11 RX ADMIN — INSULIN GLARGINE 10 UNITS: 100 INJECTION, SOLUTION SUBCUTANEOUS at 10:03

## 2024-09-11 RX ADMIN — ONDANSETRON 4 MG: 2 INJECTION INTRAMUSCULAR; INTRAVENOUS at 12:16

## 2024-09-11 RX ADMIN — EPINEPHRINE 0.02 MCG/KG/MIN: 1 INJECTION INTRAMUSCULAR; INTRAVENOUS; SUBCUTANEOUS at 18:32

## 2024-09-11 RX ADMIN — HYDROMORPHONE HYDROCHLORIDE 0.2 MG: 0.2 INJECTION, SOLUTION INTRAMUSCULAR; INTRAVENOUS; SUBCUTANEOUS at 16:31

## 2024-09-11 RX ADMIN — HEPARIN SODIUM 5000 UNITS: 5000 INJECTION, SOLUTION INTRAVENOUS; SUBCUTANEOUS at 12:27

## 2024-09-11 RX ADMIN — ACETAMINOPHEN 975 MG: 325 TABLET ORAL at 09:47

## 2024-09-11 RX ADMIN — METHOCARBAMOL 750 MG: 750 TABLET ORAL at 12:26

## 2024-09-11 RX ADMIN — ATORVASTATIN CALCIUM 40 MG: 40 TABLET, FILM COATED ORAL at 22:20

## 2024-09-11 RX ADMIN — HYDROMORPHONE HYDROCHLORIDE 0.2 MG: 0.2 INJECTION, SOLUTION INTRAMUSCULAR; INTRAVENOUS; SUBCUTANEOUS at 23:12

## 2024-09-11 RX ADMIN — METHOCARBAMOL 750 MG: 750 TABLET ORAL at 18:25

## 2024-09-11 RX ADMIN — PROCHLORPERAZINE EDISYLATE 10 MG: 5 INJECTION INTRAMUSCULAR; INTRAVENOUS at 16:14

## 2024-09-11 RX ADMIN — OXYCODONE HYDROCHLORIDE 5 MG: 5 TABLET ORAL at 19:32

## 2024-09-11 ASSESSMENT — ACTIVITIES OF DAILY LIVING (ADL)
ADLS_ACUITY_SCORE: 32
ADLS_ACUITY_SCORE: 30
ADLS_ACUITY_SCORE: 30
ADLS_ACUITY_SCORE: 32
ADLS_ACUITY_SCORE: 30
ADLS_ACUITY_SCORE: 32
ADLS_ACUITY_SCORE: 29
ADLS_ACUITY_SCORE: 30
ADLS_ACUITY_SCORE: 29
ADLS_ACUITY_SCORE: 30
ADLS_ACUITY_SCORE: 32
ADLS_ACUITY_SCORE: 32
ADLS_ACUITY_SCORE: 30

## 2024-09-11 NOTE — PROGRESS NOTES
"CLINICAL NUTRITION SERVICES - BRIEF NOTE    Received provider consult for nutrition education with comments post op cardiovascular surgery (automatic consult on post-op order set). S/p CABGx4 on 9/10. Nutrition education to be completed as able/appropriate (as pt s/p CABG and/or initial VAD).    RD will follow per LOS protocol or if re-consulted.     Martina Dias, MS, RD, LD  Available on [facility] - \"4A Clinical Dietitian\"  Weekend/Holiday RD - \"Weekend Clinical Dietitian\"  "

## 2024-09-11 NOTE — PHARMACY-ADMISSION MEDICATION HISTORY
Pharmacist Admission Medication History    Admission medication history is complete. The information provided in this note is only as accurate as the sources available at the time of the update.    Information Source(s): Patient via in-person    Pertinent Information: Patient was a good historian. Confirmed medications with patient. Last Ozempic dose was 8/31.     Changes made to PTA medication list:  Added: None  Deleted: None  Changed: None    Allergies reviewed with patient and updates made in EHR: yes    Medication History Completed By: COMPA MARIN [facility] 9/11/2024 9:03 AM    PTA Med List   Medication Sig Last Dose    albuterol (PROAIR HFA/PROVENTIL HFA/VENTOLIN HFA) 108 (90 Base) MCG/ACT inhaler Inhale 2 puffs into the lungs every 4 hours as needed Past Week at 0800    aspirin 81 MG tablet Take 81 mg by mouth every morning Past Week at 0800    atorvastatin (LIPITOR) 40 MG tablet Take 40 mg by mouth at bedtime. Past Week at 2000    budesonide-formoterol (SYMBICORT) 80-4.5 MCG/ACT Inhaler Inhale 2 puffs into the lungs as needed Past Month at 0800    bumetanide (BUMEX) 1 MG tablet Take 1 mg by mouth 2 times daily Past Week at 1400    Calcium Carbonate-Vitamin D (OYSTER SHELL CALCIUM/D) 500-5 MG-MCG TABS Take 1 tablet by mouth daily Past Week at 0800    carvedilol (COREG) 6.25 MG tablet Take 6.25 mg by mouth 2 times daily (with meals) Past Week at 0800    cetirizine (ZYRTEC) 10 MG tablet Take 10 mg by mouth daily Past Week at 0800    DOXYCYCLINE HYCLATE PO Take 100 mg by mouth every morning Past Week at 0800    empagliflozin (JARDIANCE) 25 MG TABS tablet Take 25 mg by mouth every morning Past Week at 0800    EPINEPHrine (ANY BX GENERIC EQUIV) 0.3 MG/0.3ML injection 2-pack Inject 0.3 mg into the muscle as needed for anaphylaxis Unknown    eplerenone (INSPRA) 25 MG tablet Take 25 mg by mouth every morning. Past Week at 0800    OZEMPIC, 2 MG/DOSE, 8 MG/3ML pen Inject 2 mg subcutaneously every 7 days. Fridays  Last  dose: 8/31 8/30/2024 at 0800    sacubitril-valsartan (ENTRESTO) 49-51 MG per tablet Take 1 tablet by mouth 2 times daily Unknown at 0800

## 2024-09-11 NOTE — PROGRESS NOTES
CV ICU PROGRESS NOTE    Scott Donato  [id number]  Admitted: 9/10/2024  6:42 AM      ASSESSMENT: Scott Donato is a 54 year old male with PMHx asthma, CAD, T2DM c/b charcot foot, CKD, NYHA class III HFrEf s/p 4v CABG (LIMA to LAD, SVG to D1, SVG to PDA, SVG to OM2) on 9/10/2024 by Dr. Giles.     CO-MORBIDITIES:   Patient Active Problem List   Diagnosis    Coronary artery disease     Today's changes:  Continue epi and trend SvO2, CI; weaning as able  Consider PM diuresis for PA diastolic pressure >17  Start aspirin and atorvastatin  Start lantus 10u daily    PLAN:   Neuro/ pain/ sedation:  #Acute postoperative pain  #EtOH abuse, in remission since 2010  #Bilateral hand tingling  GCS 15, no focal neurologic deficits  Bilateral radial pulses intact, sensation to light touch intact, cap refill < 2 sec  Scheduled: Tylenol  PRN: Tylenol, oxycodone, Dilaudid    Pulmonary care:   #Mechanical ventilation, s/p extubation 9/10   Goal SpO2 > 92%  Encourage IS q15-30 minutes when awake.  Daily CXR while chest tubes in place  PRN VBG and ABG  PRN breo ellipta and albuterol  Hold PTA cetirizine    Cardiovascular:    #Cardiogenic shock, ongoing  #s/p CABG x4v on 9/10  #HLD  #HFrEF, NYHA class III  #Post op vasoplegia, ongoing  Pre op echo: LV EF 3-35%, inferior RWMA  Post op echo: LV EF 25% on epi, inferior RWMA improving, valves without issue  Epi inotropic and vasopressor support, wean as able  q4h Yaritza numbers  Goal MAP >65, SBP <140, monitor hemodynamics  Require 1x shoce coming off bypass, currently in sinus tachy/sinus rhyrhm  V wires with VVI backup rate 50  Hold PTA coreg, entresto  ASA 81 mg daily  Atorvastatin 40 mg daily    GI care / Nutrition:   #GERD  Regular diet  Bowel regimen: MiraLAX, senna; prn MoM and bisacodyl  PPI (none PTA)  PRN antiemetics  Hold PTA tums    Renal / Fluids / Electrolytes:   #CKD  BL creat appears to be ~ 1.3-1.4  Strict I/O, daily weights, avoid/limit nephrotoxins  eLytes replacement  protocol  Replete lytes PRN per protocol  Hold PTA bumex and eplerenone    Endocrine:    #Stress hyperglycemia  Preop A1c 6.1  Continue Insulin gtt  Start lantus 10 u daily  Goal BG <180 for optimal healing  Hold PTA empagliflozin, ozempic    ID / Antibiotics:  #Stress induced leukocytosis  To complete perioperative regimen, cefazolin 48 hr  Monitor fever curve, WBC, and inflammatory markers as appropriate    Heme:     #Acute blood loss anemia  #Acute blood loss thrombocytopenia  No s/sx active bleeding  am CBC   Hgb goal > 7.0  Hemoglobin 9.8   SQH ppx    MSK / Skin:  #Sternotomy  #Surgical Incision  #Osteomyelitis 2/2 charcot foot s/p L transmetatarsal amputation and TMA R foot, 2023  Sternal precautions, incision protocol  PT/OT/CR    Prophylaxis:     Mechanical DVT ppx  Chemical DVT ppx: SQH 5000 TID  PPI    Lines / Tubes / Drains:  RIJ CVC, PA catheter, day 1  L radial arterial Line, day 1  CTs x4 (2 pleural, 2 meds), day 1  Lou, day 1    An ICU checklist was performed on rounds.    Disposition:  CVICU    Patient seen, findings and plan discussed with CVICU staff.    Gerson Ro MD  General Surgery, PGY-1  7:26 AM 09/11/24    ** For on call schedules and contact information, please refer to [facility] **      ====================================    TODAY'S PROGRESS  SUBJECTIVE  Patient reports he is doing well this morning.  Pain is under control.  He did vomit x 1    OBJECTIVE  1. VITAL SIGNS  Temp:  [97.5  F (36.4  C)-100  F (37.8  C)] 100  F (37.8  C)  Pulse:  [] 106  Resp:  [11-23] 13  MAP:  [61 mmHg-103 mmHg] 65 mmHg  Arterial Line BP: ()/(53-84) 91/54  FiO2 (%):  [50 %-60 %] 50 %  SpO2:  [93 %-100 %] 93 %  FiO2 (%): 50 %, Resp: 13, Vent Mode: CPAP/PS, Resp Rate (Set): (S) 20 breaths/min, Tidal Volume (Set, mL): (S) 500 mL, PEEP (cm H2O): 5 cmH2O, Pressure Support (cm H2O): 5 cmH2O, Resp Rate (Set): (S) 20 breaths/min, Tidal Volume (Set, mL): (S) 500 mL, PEEP (cm H2O): 5 cmH2O    2. INTAKE/  OUTPUT  I/O last 3 completed shifts:  In: 4590.4 [P.O.:720; I.V.:2974.4; Other:415; NG/GT:120; IV Piggyback:150]  Out: 3838 [Urine:1815; Blood:1200; Chest Tube:823]    3. PHYSICAL EXAMINATION    Awakens to voice, intermittently somnolent, alert and oriented when awake  Tachycardic but sinus rhythm, normal S1 and S2, no murmurs rubs or gallops, warm and well-perfused distally, no significant peripheral edema  Normal work of breathing on room air, chest wall rise equal and symmetric, lungs clear to auscultation bilaterally, no wheezing, stridor, crackles, rales  Chest tubes at -20 suction, serosanguineous drainage, no airleak to cough or deep breathing  Abdomen soft, nontender, nondistended  Lou in place draining light yellow urine  Moves all 4 extremities spontaneously, no focal neurologic deficits appreciated on examination    4. INVESTIGATIONS  Arterial Blood Gases   Recent Labs   Lab 09/11/24  0337 09/10/24  2356 09/10/24  2232 09/10/24  1953   PH 7.37 7.35 7.34* 7.44   PCO2 44 44 47* 38   PO2 115* 114* 158* 187*   HCO3 25 24 25 26     Complete Blood Count   Recent Labs   Lab 09/11/24  0325 09/10/24  2232 09/10/24  1730 09/10/24  1615 09/10/24  1602   WBC 12.2* 11.2* 12.0*  --   --    HGB 11.7* 12.0* 12.0* 11.6*  --    * 151 127*  --  121*     Basic Metabolic Panel  Recent Labs   Lab 09/11/24  0701 09/11/24  0607 09/11/24  0503 09/11/24  0335 09/11/24  0325 09/11/24  0001 09/10/24  2232 09/10/24  1823 09/10/24  1730 09/10/24  1615 09/10/24  0742 09/10/24  0734   NA  --   --   --   --  138  --  140  --  140 139   < > 140   POTASSIUM  --   --   --   --  4.8  --  4.8  --  4.3 4.9   < > 4.3   CHLORIDE  --   --   --   --  106  --  107  --  107  --   --  104   CO2  --   --   --   --  23  --  22  --  24  --   --  23   BUN  --   --   --   --  22.9*  --  21.9*  --  21.2*  --   --  24.2*   CR  --   --   --   --  1.25*  --  1.24*  --  1.25*  --   --  1.29*   * 177* 192* 213* 219*   < > 185*   < > 140*  135*  171*   < > 161*    < > = values in this interval not displayed.     Liver Function Tests  Recent Labs   Lab 09/11/24  0325 09/10/24  2232 09/10/24  1730 09/10/24  1602   AST 47* 48* 44  --    ALT 13 15 15  --    ALKPHOS 77 79 83  --    BILITOTAL 0.6 0.7 0.6  --    ALBUMIN 3.5 3.4* 3.3*  --    INR  --  1.19* 1.30* 1.53*     Pancreatic Enzymes  No lab results found in last 7 days.    Coagulation Profile  Recent Labs   Lab 09/10/24  2232 09/10/24  1730 09/10/24  1602   INR 1.19* 1.30* 1.53*   PTT 34 32 28     Lactate  Lactate 1.3    5. RADIOLOGY  Chest x-ray 9/11/2024  Improving left-sided opacities, chest tubes appropriately placed, no pleural effusion or pneumothorax

## 2024-09-11 NOTE — PLAN OF CARE
Admitted/transferred from: OR  Reason for admission/transfer: Post-op CAB monitoring  2 RN skin assessment: completed by Vivi YI  Result of skin assessment and interventions/actions: Midsternal incision; CT x4; L leg graft sites-covered by ACE wrap; R IABP groin site; blanchable redness on sacrum/coccyx; R metatarsal amputations  Height, weight, drug calc weight: Done  Patient belongings (see Flowsheet): Called PACU to deliver belongings to room  MDRO education added to care plan: Yes  ?   RASS -5. Sedated on propofol gtt + fentanyl gtt. Afebrile. SR/ST 90s-100s. Back up V-wires at 60bpm; no pacing noted since arrival from OR. Epinephrine gtt + 500mL LR bolus to maintain SBP goal < 140 and MAP goal > 65; epinephrine gtt to be left on at 0.03 mcg/kg/min overnight. IABP removed and fem stop removed by 1830; strict bedrest for 6 hours, until 1230am. Vance @ 53; last ANGELLA: CVP 5, PA 27/14, SvO2 73, CI 2.8, CO 6.8, . CMV 50%/500/20/5. CT x4; 2 mediastinal, 2 pleurals; 200mL/hr bright red/sanguinous output in mediastinal chest tubes from 5922-6412; CTICU aware; pt rolled a few times during this period to obtain chest and abdominal x-rays and change linens. OG @ 73 clamped. Insulin gtt titrated. Lou with adequate UOP. Last BM PTA. Midsternal incision; CT x4; L leg graft sites; R metatarsal amputations.     Plan: Strict bedrest until 1230am. Wean sedation after strict bedrest finished. PS trials and extubation when appropriate.     Goal Outcome Evaluation:      Plan of Care Reviewed With: patient    Overall Patient Progress: no changeOverall Patient Progress: no change    Outcome Evaluation: IABP removed post op; strict  bedrest for 6 hours.

## 2024-09-11 NOTE — PROGRESS NOTES
[facility], Procedure Note          Extubation:       Scott Donato  MRN# [id number]   September 10, 2024, 11:04 PM         Patient extubated at: September 10, 2024, 10:55 PM   Supplemental Oxygen: Via nasal cannula at 2 liters per minute   Cough: The cough is good and productive   Secretion Mode: Able to clear   Secretion Amount: Moderate amount, thick and tan in color   Respiratory Exam:: Breath sounds: diminished     Location: all lobes   Skin Exam:: Patient color: natural   Patient Status: Currently appears comfortable   Arterial Blood Gasses: pH Arterial (no units)   Date Value   09/10/2024 7.34 (L)     pO2 Arterial (mm Hg)   Date Value   09/10/2024 158 (H)     pCO2 Arterial (mm Hg)   Date Value   09/10/2024 47 (H)     Bicarbonate Arterial (mmol/L)   Date Value   09/10/2024 25            Recorded by Edmund Ordoñez RT

## 2024-09-11 NOTE — PROGRESS NOTES
09/11/24 0900   Appointment Info   Signing Clinician's Name / Credentials (OT) Myra Melton OTR/L   Rehab Comments (OT) OT CR   Living Environment   People in Home alone   Current Living Arrangements apartment   Home Accessibility stairs to enter home   Number of Stairs, Main Entrance 6   Stair Railings, Main Entrance railing on left side (ascending)   Transportation Anticipated family or friend will provide   Living Environment Comments Pt reports PLan to d/c to SO condo with elevator and tuib/shower. Home has no DME   Self-Care   Usual Activity Tolerance good   Current Activity Tolerance moderate   Equipment Currently Used at Home cane, straight;walker, standard   Fall history within last six months no   Activity/Exercise/Self-Care Comment Pt reports PLOF as IND with all cares and mobility at baseline. Rt toe amp so has FWW and cane but was not using prior to hospital stay.   General Information   Onset of Illness/Injury or Date of Surgery 09/10/24   Referring Physician Evan Giles MD   Patient/Family Therapy Goal Statement (OT) Return home.   Additional Occupational Profile Info/Pertinent History of Current Problem Scott Donato is a 54 year old male with PMH of asthma, CAD, HLD, T2DM cb Charcot foot and CKD, and HFrEF, NYHA class III. Presents to [facility] for insertion of intraaortic balloon pump and CABG x4 (LIMA to LAD, SVG to D1, SVG to OM2, SVG to PDA) by Dr. Giles on 9/10/24.   Existing Precautions/Restrictions cardiac   Left Upper Extremity (Weight-bearing Status) partial weight-bearing (PWB)   Right Upper Extremity (Weight-bearing Status) partial weight-bearing (PWB)   Left Lower Extremity (Weight-bearing Status) full weight-bearing (FWB)   Right Lower Extremity (Weight-bearing Status) full weight-bearing (FWB)   Cognitive Status Examination   Orientation Status orientation to person, place and time   Affect/Mental Status (Cognitive) low arousal/lethargic   Visual Perception   Visual  Impairment/Limitations WNL   Sensory   Sensory Quick Adds sensation intact   Pain Assessment   Patient Currently in Pain No   Posture   Posture forward head position   Range of Motion Comprehensive   General Range of Motion bilateral upper extremity ROM WFL   Strength Comprehensive (MMT)   General Manual Muscle Testing (MMT) Assessment no strength deficits identified   Comment, General Manual Muscle Testing (MMT) Assessment Within precautions   Coordination   Upper Extremity Coordination No deficits were identified   Bed Mobility   Bed Mobility supine-sit   Comment (Bed Mobility) mod  A   Transfers   Transfers sit-stand transfer;bed-chair transfer   Transfer Comments min A   Balance   Balance Assessment standing dynamic balance;sitting dynamic balance   Balance Comments CGA   Activities of Daily Living   BADL Assessment/Intervention toileting;lower body dressing;bathing   Bathing Assessment/Intervention   [place] Level (Bathing) minimum assist (75% patient effort)   Comment, (Bathing) Per clinical judgement   Lower Body Dressing Assessment/Training   Comment, (Lower Body Dressing) Per clinical judgement   [place] Level (Lower Body Dressing) moderate assist (50% patient effort)   Toileting   Comment, (Toileting) Per clinical judgement   [place] Level (Toileting) maximum assist (25% patient effort)   Clinical Impression   Criteria for Skilled Therapeutic Interventions Met (OT) Yes, treatment indicated   OT Diagnosis Decreased ADL function   OT Problem List-Impairments impacting ADL problems related to;activity tolerance impaired;mobility;range of motion (ROM);strength;post-surgical precautions;pain   Assessment of Occupational Performance 3-5 Performance Deficits   Planned Therapy Interventions (OT) ADL retraining;IADL retraining;balance training;strengthening;transfer training;home program guidelines;progressive activity/exercise   Clinical Decision Making Complexity (OT) detailed assessment/moderate  complexity   Risk & Benefits of therapy have been explained evaluation/treatment results reviewed;care plan/treatment goals reviewed;risks/benefits reviewed;current/potential barriers reviewed;participants voiced agreement with care plan;participants included;patient   Clinical Impression Comments Pt below baseline and will benefit from skilled OT throughout hospital stay to promote return to functional baseline and assist with d/c planning.   OT Total Evaluation Time   OT Eval, Moderate Complexity Minutes (05481) 10   OT Goals   Therapy Frequency (OT) 6 times/week   OT Predicted Duration/Target Date for Goal Attainment 10/04/24   OT Goals Lower Body Dressing;Lower Body Bathing;Toilet Transfer/Toileting;Home Management;Cardiac Phase 1   OT: Lower Body Dressing Modified independent;within precautions   OT: Lower Body Bathing Modified independent;with precautions   OT: Toilet Transfer/Toileting Modified independent;within precautions   OT: Home Management Modified independent;with light demand household tasks;ambulatory level   OT: Understanding of cardiac education to maximize quality of life, condition management, and health outcomes Patient   OT: Perform aerobic activity with stable cardiovascular response intermittent;15 minutes   OT: Functional/aerobic ambulation tolerance with stable cardiovascular response in order to return to home and community environment Modified independent;Greater than 300 feet   OT: Navigation of stairs simulating home set up with stable cardiovascular response in order to return to home and community environment Modified independent;6 stairs;Rail on left   OT Discharge Planning   OT Plan Standing josette, Mobility, CR foilder   OT Discharge Recommendation (DC Rec) home with outpatient cardiac rehab   OT Rationale for DC Rec Pt below baseline currenty limited by deconditioning and new precautions. Anticipate pt progressing well with therapy and being safe for d/c home with support when  medically ready.   OT Brief overview of current status mod A bed mobility, Min A SPT   Total Session Time   Total Session Time (sum of timed and untimed services) 10

## 2024-09-11 NOTE — PLAN OF CARE
"Admitted 9/10/24 had a 3 vessel CABG    Patient is alert and oriented x4, He LOWE. He was extubated around 2300, PRN Oxycodone 10 given x1 for chest incisional pain. He c/o numbness/tinging in left LE. All toes amputated on right foot. Pupils to PERRL SR-ST HR , map goal >65, SBP <140, Temp Pacer on standby, CVP 6,8,9, CO 5.7, CI 2.4,  Tolerating NC at 1L, Right groin site soft no hematoma noted, left leg graft site wrapped with ace wraps, Midline chest incision intake, Right amputated toes    /72 (BP Location: Left arm)   Pulse 111   Temp 100  F (37.8  C)   Resp 14   Ht 1.88 m (6' 2\")   Wt 108.4 kg (238 lb 15.7 oz)   SpO2 100%   BMI 30.68 kg/m      "

## 2024-09-11 NOTE — PLAN OF CARE
Major Shift Events: Up to chair most of shift. Intermittent bouts of nausea with position changes throughout the day.    Neuro: Lethargic; oriented x4; follows commands and LOWE. Up with 1-2A and GB to chair most of day. PRN oxycodone + robaxin + dilaudid IVP + scheduled tylenol given for midsternal incisional pain with relief. PRN atarax given for anxiety.    CV: Afebrile, Tmax 37.4. SR/ST, occasional PVCs, HR 90s-110s. Back up V-wires capped. Epinephrine gtt to maintain MAP goal > 65 and SBP < 140. FICKs Q4H; last ANGELLA : CVP 9, PA 38/17, SvO2 59, CI 2.1, CO 5.1, SVR 1058.     Pulm: LS clear/diminished on RA; shallow breathing d/t midsternal incisional pain, no accessory muscle use; IS use encouraged. CT x4 with bright red/sanguinous output, approximately 10-20mL/hr.    GI: 2g Na diet; Mod CHO diet; fair appetite. Frequent bouts of nausea with position changes; PRN zofran + compazine given with relief. BS normoactive; LBM PTA.    : Adequate UOP in wick, 40-60mL/hr.    Skin: Midsternal incision; CT sites; L leg graft site; R IABP groin site; blanchable redness on sacrum/coccyx    Lines:  -R I J MAC [place] @ 53  -L radial arterial line  -R PIV    Drains:  -wick catheter  -CT x4: 2 meds + R pleural + L pleural    Drips:  -epinephrine @ 0.02  -insulin @ 5 units, algorithm 4+1    Plan: Pain management + minimize nausea triggers. Work with therapies. Notify primary team of changes in POC.  For vital signs and complete assessments, please see documentation flowsheets.     GASPER BRADSHAW, RN on 9/11/2024 at 7:00 PM     Goal Outcome Evaluation:      Plan of Care Reviewed With: patient    Overall Patient Progress: improvingOverall Patient Progress: improving    Outcome Evaluation: VSS on epi gtt; up with 1A to chair most of day.

## 2024-09-12 ENCOUNTER — APPOINTMENT (OUTPATIENT)
Dept: PHYSICAL THERAPY | Facility: CLINIC | Age: 54
DRG: 235 | End: 2024-09-12
Payer: COMMERCIAL

## 2024-09-12 ENCOUNTER — APPOINTMENT (OUTPATIENT)
Dept: OCCUPATIONAL THERAPY | Facility: CLINIC | Age: 54
DRG: 235 | End: 2024-09-12
Attending: THORACIC SURGERY (CARDIOTHORACIC VASCULAR SURGERY)
Payer: COMMERCIAL

## 2024-09-12 ENCOUNTER — APPOINTMENT (OUTPATIENT)
Dept: GENERAL RADIOLOGY | Facility: CLINIC | Age: 54
DRG: 235 | End: 2024-09-12
Payer: COMMERCIAL

## 2024-09-12 LAB
ABO/RH(D): NORMAL
ALBUMIN SERPL BCG-MCNC: 3.1 G/DL (ref 3.5–5.2)
ALP SERPL-CCNC: 57 U/L (ref 40–150)
ALT SERPL W P-5'-P-CCNC: 11 U/L (ref 0–70)
ANION GAP SERPL CALCULATED.3IONS-SCNC: 7 MMOL/L (ref 7–15)
ANTIBODY SCREEN: NEGATIVE
AST SERPL W P-5'-P-CCNC: 32 U/L (ref 0–45)
BASE EXCESS BLDV CALC-SCNC: 2 MMOL/L (ref -3–3)
BASE EXCESS BLDV CALC-SCNC: 2.3 MMOL/L (ref -3–3)
BASE EXCESS BLDV CALC-SCNC: 3.1 MMOL/L (ref -3–3)
BASE EXCESS BLDV CALC-SCNC: 3.2 MMOL/L (ref -3–3)
BILIRUB SERPL-MCNC: 0.5 MG/DL
BUN SERPL-MCNC: 18.1 MG/DL (ref 6–20)
CA-I BLD-MCNC: 4.7 MG/DL (ref 4.4–5.2)
CALCIUM SERPL-MCNC: 8.2 MG/DL (ref 8.8–10.4)
CHLORIDE SERPL-SCNC: 106 MMOL/L (ref 98–107)
CREAT SERPL-MCNC: 1.01 MG/DL (ref 0.67–1.17)
EGFRCR SERPLBLD CKD-EPI 2021: 88 ML/MIN/1.73M2
ERYTHROCYTE [DISTWIDTH] IN BLOOD BY AUTOMATED COUNT: 13.7 % (ref 10–15)
GLUCOSE BLDC GLUCOMTR-MCNC: 106 MG/DL (ref 70–99)
GLUCOSE BLDC GLUCOMTR-MCNC: 107 MG/DL (ref 70–99)
GLUCOSE BLDC GLUCOMTR-MCNC: 116 MG/DL (ref 70–99)
GLUCOSE BLDC GLUCOMTR-MCNC: 117 MG/DL (ref 70–99)
GLUCOSE BLDC GLUCOMTR-MCNC: 127 MG/DL (ref 70–99)
GLUCOSE BLDC GLUCOMTR-MCNC: 136 MG/DL (ref 70–99)
GLUCOSE BLDC GLUCOMTR-MCNC: 152 MG/DL (ref 70–99)
GLUCOSE BLDC GLUCOMTR-MCNC: 81 MG/DL (ref 70–99)
GLUCOSE SERPL-MCNC: 86 MG/DL (ref 70–99)
HCO3 BLDV-SCNC: 28 MMOL/L (ref 21–28)
HCO3 BLDV-SCNC: 28 MMOL/L (ref 21–28)
HCO3 BLDV-SCNC: 29 MMOL/L (ref 21–28)
HCO3 BLDV-SCNC: 29 MMOL/L (ref 21–28)
HCO3 SERPL-SCNC: 25 MMOL/L (ref 22–29)
HCT VFR BLD AUTO: 27.9 % (ref 40–53)
HGB BLD-MCNC: 9.4 G/DL (ref 13.3–17.7)
HGB BLD-MCNC: 9.8 G/DL (ref 13.3–17.7)
MAGNESIUM SERPL-MCNC: 2 MG/DL (ref 1.7–2.3)
MCH RBC QN AUTO: 31.4 PG (ref 26.5–33)
MCHC RBC AUTO-ENTMCNC: 33.7 G/DL (ref 31.5–36.5)
MCV RBC AUTO: 93 FL (ref 78–100)
O2/TOTAL GAS SETTING VFR VENT: 1 %
O2/TOTAL GAS SETTING VFR VENT: 2 %
O2/TOTAL GAS SETTING VFR VENT: 2 %
O2/TOTAL GAS SETTING VFR VENT: 25 %
OXYHGB MFR BLDV: 64 % (ref 70–75)
OXYHGB MFR BLDV: 66 % (ref 70–75)
OXYHGB MFR BLDV: 68 % (ref 70–75)
OXYHGB MFR BLDV: 68 % (ref 70–75)
PCO2 BLDV: 49 MM HG (ref 40–50)
PCO2 BLDV: 49 MM HG (ref 40–50)
PCO2 BLDV: 51 MM HG (ref 40–50)
PCO2 BLDV: 51 MM HG (ref 40–50)
PH BLDV: 7.37 [PH] (ref 7.32–7.43)
PHOSPHATE SERPL-MCNC: 3 MG/DL (ref 2.5–4.5)
PLATELET # BLD AUTO: 78 10E3/UL (ref 150–450)
PO2 BLDV: 36 MM HG (ref 25–47)
PO2 BLDV: 36 MM HG (ref 25–47)
PO2 BLDV: 38 MM HG (ref 25–47)
PO2 BLDV: 38 MM HG (ref 25–47)
POTASSIUM SERPL-SCNC: 4 MMOL/L (ref 3.4–5.3)
PROT SERPL-MCNC: 5.4 G/DL (ref 6.4–8.3)
RBC # BLD AUTO: 2.99 10E6/UL (ref 4.4–5.9)
SAO2 % BLDV: 65.9 % (ref 70–75)
SAO2 % BLDV: 67.6 % (ref 70–75)
SAO2 % BLDV: 70.1 % (ref 70–75)
SAO2 % BLDV: 70.3 % (ref 70–75)
SODIUM SERPL-SCNC: 138 MMOL/L (ref 135–145)
SPECIMEN EXPIRATION DATE: NORMAL
WBC # BLD AUTO: 5.8 10E3/UL (ref 4–11)

## 2024-09-12 PROCEDURE — 85018 HEMOGLOBIN: CPT

## 2024-09-12 PROCEDURE — 85027 COMPLETE CBC AUTOMATED: CPT | Performed by: STUDENT IN AN ORGANIZED HEALTH CARE EDUCATION/TRAINING PROGRAM

## 2024-09-12 PROCEDURE — 97530 THERAPEUTIC ACTIVITIES: CPT | Mod: GO | Performed by: OCCUPATIONAL THERAPIST

## 2024-09-12 PROCEDURE — 99232 SBSQ HOSP IP/OBS MODERATE 35: CPT | Mod: 24 | Performed by: ANESTHESIOLOGY

## 2024-09-12 PROCEDURE — 97110 THERAPEUTIC EXERCISES: CPT | Mod: GP | Performed by: PHYSICAL THERAPIST

## 2024-09-12 PROCEDURE — 84100 ASSAY OF PHOSPHORUS: CPT | Performed by: STUDENT IN AN ORGANIZED HEALTH CARE EDUCATION/TRAINING PROGRAM

## 2024-09-12 PROCEDURE — 250N000013 HC RX MED GY IP 250 OP 250 PS 637

## 2024-09-12 PROCEDURE — 999N000128 HC STATISTIC PERIPHERAL IV START W/O US GUIDANCE

## 2024-09-12 PROCEDURE — 82330 ASSAY OF CALCIUM: CPT | Performed by: THORACIC SURGERY (CARDIOTHORACIC VASCULAR SURGERY)

## 2024-09-12 PROCEDURE — 97110 THERAPEUTIC EXERCISES: CPT | Mod: GO | Performed by: OCCUPATIONAL THERAPIST

## 2024-09-12 PROCEDURE — 97530 THERAPEUTIC ACTIVITIES: CPT | Mod: GP | Performed by: PHYSICAL THERAPIST

## 2024-09-12 PROCEDURE — 71045 X-RAY EXAM CHEST 1 VIEW: CPT | Mod: 26 | Performed by: STUDENT IN AN ORGANIZED HEALTH CARE EDUCATION/TRAINING PROGRAM

## 2024-09-12 PROCEDURE — 250N000011 HC RX IP 250 OP 636: Performed by: THORACIC SURGERY (CARDIOTHORACIC VASCULAR SURGERY)

## 2024-09-12 PROCEDURE — 250N000013 HC RX MED GY IP 250 OP 250 PS 637: Performed by: THORACIC SURGERY (CARDIOTHORACIC VASCULAR SURGERY)

## 2024-09-12 PROCEDURE — 86900 BLOOD TYPING SEROLOGIC ABO: CPT | Performed by: THORACIC SURGERY (CARDIOTHORACIC VASCULAR SURGERY)

## 2024-09-12 PROCEDURE — 82805 BLOOD GASES W/O2 SATURATION: CPT

## 2024-09-12 PROCEDURE — 80053 COMPREHEN METABOLIC PANEL: CPT | Performed by: STUDENT IN AN ORGANIZED HEALTH CARE EDUCATION/TRAINING PROGRAM

## 2024-09-12 PROCEDURE — 83735 ASSAY OF MAGNESIUM: CPT | Performed by: STUDENT IN AN ORGANIZED HEALTH CARE EDUCATION/TRAINING PROGRAM

## 2024-09-12 PROCEDURE — 71045 X-RAY EXAM CHEST 1 VIEW: CPT

## 2024-09-12 PROCEDURE — 120N000002 HC R&B MED SURG/OB UMMC

## 2024-09-12 PROCEDURE — 97161 PT EVAL LOW COMPLEX 20 MIN: CPT | Mod: GP | Performed by: PHYSICAL THERAPIST

## 2024-09-12 PROCEDURE — 250N000013 HC RX MED GY IP 250 OP 250 PS 637: Performed by: NURSE PRACTITIONER

## 2024-09-12 RX ORDER — POLYETHYLENE GLYCOL 3350 17 G/17G
17 POWDER, FOR SOLUTION ORAL 2 TIMES DAILY
Status: DISCONTINUED | OUTPATIENT
Start: 2024-09-12 | End: 2024-09-16 | Stop reason: HOSPADM

## 2024-09-12 RX ORDER — PANTOPRAZOLE SODIUM 40 MG/1
40 TABLET, DELAYED RELEASE ORAL
Status: DISCONTINUED | OUTPATIENT
Start: 2024-09-13 | End: 2024-09-16 | Stop reason: HOSPADM

## 2024-09-12 RX ORDER — MAGNESIUM SULFATE HEPTAHYDRATE 40 MG/ML
2 INJECTION, SOLUTION INTRAVENOUS ONCE
Status: COMPLETED | OUTPATIENT
Start: 2024-09-12 | End: 2024-09-12

## 2024-09-12 RX ORDER — NICOTINE POLACRILEX 4 MG
15-30 LOZENGE BUCCAL
Status: DISCONTINUED | OUTPATIENT
Start: 2024-09-12 | End: 2024-09-16 | Stop reason: HOSPADM

## 2024-09-12 RX ORDER — CARVEDILOL 3.12 MG/1
3.12 TABLET ORAL 2 TIMES DAILY
Status: DISCONTINUED | OUTPATIENT
Start: 2024-09-12 | End: 2024-09-16 | Stop reason: HOSPADM

## 2024-09-12 RX ORDER — AMOXICILLIN 250 MG
2 CAPSULE ORAL 2 TIMES DAILY
Status: DISCONTINUED | OUTPATIENT
Start: 2024-09-12 | End: 2024-09-16 | Stop reason: HOSPADM

## 2024-09-12 RX ORDER — BUMETANIDE 1 MG/1
1 TABLET ORAL
Status: DISCONTINUED | OUTPATIENT
Start: 2024-09-12 | End: 2024-09-14

## 2024-09-12 RX ORDER — DEXTROSE MONOHYDRATE 25 G/50ML
25-50 INJECTION, SOLUTION INTRAVENOUS
Status: DISCONTINUED | OUTPATIENT
Start: 2024-09-12 | End: 2024-09-16 | Stop reason: HOSPADM

## 2024-09-12 RX ADMIN — ONDANSETRON 4 MG: 2 INJECTION INTRAMUSCULAR; INTRAVENOUS at 00:41

## 2024-09-12 RX ADMIN — BUMETANIDE 1 MG: 1 TABLET ORAL at 10:30

## 2024-09-12 RX ADMIN — ONDANSETRON 4 MG: 4 TABLET, ORALLY DISINTEGRATING ORAL at 08:18

## 2024-09-12 RX ADMIN — FLUTICASONE FUROATE AND VILANTEROL TRIFENATATE 1 PUFF: 200; 25 POWDER RESPIRATORY (INHALATION) at 08:31

## 2024-09-12 RX ADMIN — POLYETHYLENE GLYCOL 3350 17 G: 17 POWDER, FOR SOLUTION ORAL at 08:21

## 2024-09-12 RX ADMIN — BUMETANIDE 1 MG: 1 TABLET ORAL at 15:44

## 2024-09-12 RX ADMIN — METHOCARBAMOL 750 MG: 750 TABLET ORAL at 00:41

## 2024-09-12 RX ADMIN — ACETAMINOPHEN 975 MG: 325 TABLET ORAL at 18:09

## 2024-09-12 RX ADMIN — PROCHLORPERAZINE EDISYLATE 10 MG: 5 INJECTION INTRAMUSCULAR; INTRAVENOUS at 10:18

## 2024-09-12 RX ADMIN — SENNOSIDES AND DOCUSATE SODIUM 2 TABLET: 8.6; 5 TABLET ORAL at 21:08

## 2024-09-12 RX ADMIN — CARVEDILOL 3.12 MG: 3.12 TABLET, FILM COATED ORAL at 21:09

## 2024-09-12 RX ADMIN — PANTOPRAZOLE SODIUM 40 MG: 40 TABLET, DELAYED RELEASE ORAL at 08:22

## 2024-09-12 RX ADMIN — OXYCODONE HYDROCHLORIDE 5 MG: 5 TABLET ORAL at 05:40

## 2024-09-12 RX ADMIN — CARVEDILOL 3.12 MG: 3.12 TABLET, FILM COATED ORAL at 10:30

## 2024-09-12 RX ADMIN — HYDROXYZINE HYDROCHLORIDE 25 MG: 25 TABLET ORAL at 15:45

## 2024-09-12 RX ADMIN — POLYETHYLENE GLYCOL 3350 17 G: 17 POWDER, FOR SOLUTION ORAL at 21:08

## 2024-09-12 RX ADMIN — MAGNESIUM SULFATE HEPTAHYDRATE 2 G: 2 INJECTION, SOLUTION INTRAVENOUS at 05:40

## 2024-09-12 RX ADMIN — METHOCARBAMOL 750 MG: 750 TABLET ORAL at 08:22

## 2024-09-12 RX ADMIN — HYDROMORPHONE HYDROCHLORIDE 0.2 MG: 0.2 INJECTION, SOLUTION INTRAMUSCULAR; INTRAVENOUS; SUBCUTANEOUS at 08:18

## 2024-09-12 RX ADMIN — ONDANSETRON 4 MG: 4 TABLET, ORALLY DISINTEGRATING ORAL at 15:44

## 2024-09-12 RX ADMIN — INSULIN GLARGINE 10 UNITS: 100 INJECTION, SOLUTION SUBCUTANEOUS at 08:30

## 2024-09-12 RX ADMIN — ACETAMINOPHEN 975 MG: 325 TABLET ORAL at 02:10

## 2024-09-12 RX ADMIN — ASPIRIN 81 MG CHEWABLE TABLET 162 MG: 81 TABLET CHEWABLE at 08:21

## 2024-09-12 RX ADMIN — HEPARIN SODIUM 5000 UNITS: 5000 INJECTION, SOLUTION INTRAVENOUS; SUBCUTANEOUS at 21:09

## 2024-09-12 RX ADMIN — HYDROXYZINE HYDROCHLORIDE 25 MG: 25 TABLET ORAL at 05:40

## 2024-09-12 RX ADMIN — HEPARIN SODIUM 5000 UNITS: 5000 INJECTION, SOLUTION INTRAVENOUS; SUBCUTANEOUS at 13:52

## 2024-09-12 RX ADMIN — HEPARIN SODIUM 5000 UNITS: 5000 INJECTION, SOLUTION INTRAVENOUS; SUBCUTANEOUS at 04:15

## 2024-09-12 RX ADMIN — SENNOSIDES AND DOCUSATE SODIUM 2 TABLET: 8.6; 5 TABLET ORAL at 08:22

## 2024-09-12 RX ADMIN — HYDROXYZINE HYDROCHLORIDE 25 MG: 25 TABLET ORAL at 21:09

## 2024-09-12 RX ADMIN — ACETAMINOPHEN 975 MG: 325 TABLET ORAL at 10:30

## 2024-09-12 RX ADMIN — OXYCODONE HYDROCHLORIDE 5 MG: 5 TABLET ORAL at 15:45

## 2024-09-12 RX ADMIN — ATORVASTATIN CALCIUM 40 MG: 40 TABLET, FILM COATED ORAL at 21:09

## 2024-09-12 ASSESSMENT — ACTIVITIES OF DAILY LIVING (ADL)
ADLS_ACUITY_SCORE: 32
ADLS_ACUITY_SCORE: 33
ADLS_ACUITY_SCORE: 32
ADLS_ACUITY_SCORE: 32
ADLS_ACUITY_SCORE: 33
ADLS_ACUITY_SCORE: 32
ADLS_ACUITY_SCORE: 33
ADLS_ACUITY_SCORE: 34
ADLS_ACUITY_SCORE: 32
ADLS_ACUITY_SCORE: 33
ADLS_ACUITY_SCORE: 32
ADLS_ACUITY_SCORE: 32
DEPENDENT_IADLS:: INDEPENDENT
ADLS_ACUITY_SCORE: 32
ADLS_ACUITY_SCORE: 34
ADLS_ACUITY_SCORE: 32
ADLS_ACUITY_SCORE: 33
ADLS_ACUITY_SCORE: 33
ADLS_ACUITY_SCORE: 32
ADLS_ACUITY_SCORE: 33

## 2024-09-12 NOTE — PROGRESS NOTES
CV ICU PROGRESS NOTE    Scott Donato  [id number]  Admitted: 9/10/2024  6:42 AM      ASSESSMENT: Scott Donato is a 54 year old male with PMHx asthma, CAD, T2DM c/b charcot foot, CKD, NYHA class III HFrEf s/p 4v CABG (LIMA to LAD, SVG to D1, SVG to PDA, SVG to OM2) on 9/10/2024 by Dr. Giles.     CO-MORBIDITIES:   Patient Active Problem List   Diagnosis    Coronary artery disease     Today's changes:  Weaned off dobutamine, CI and SVO2 stable  Restarting PTA Bumex  Starting Coreg 3.125 twice daily  Transitioning to HDSSI from insulin drip  Remove lines  Transfer to floor order in    PLAN:   Neuro/ pain/ sedation:  #Acute postoperative pain  #EtOH abuse, in remission since 2010  #Bilateral hand tingling, resolved  #Anxiety  GCS 15, no focal neurologic deficits  Scheduled: Tylenol  PRN: Tylenol, oxycodone, Dilaudid, Robaxin  Hydroxyzine 25-50 mg every 6 hours as needed    Pulmonary care:   #Mechanical ventilation, s/p extubation 9/10  #Intermittent asthma  Goal SpO2 > 92%, currently stable on room air  Encourage IS q15-30 minutes when awake.  Daily CXR while chest tubes in place  PRN VBG and ABG  PTA breo ellipta and albuterol  Hold PTA cetirizine    Cardiovascular:    #Cardiogenic shock, ongoing  #s/p CABG x4v on 9/10  #HLD  #HFrEF, NYHA class III  #Post op vasoplegia, ongoing  Pre op echo: LV EF 3-35%, inferior RWMA  Post op echo: LV EF 25% on epi, inferior RWMA improving, valves without issue  Off epi since 9/12 AM  No longer need to trend hemodynamic numbers  Goal MAP >65, SBP <140, monitor hemodynamics  Require 1x shock coming off bypass, currently in sinus tachy/sinus rhyrhm  V wires with VVI backup rate 50  Restart Coreg, 3.125 mg twice daily (1/2 PTA dose)  Hold PTA Entresto  ASA 81 mg daily  Atorvastatin 40 mg daily  Hydralazine 10 mg every 30 minute as needed for SBP greater than 140    GI care / Nutrition:   #GERD  #Nausea, improving  Regular diet  Bowel regimen: MiraLAX, senna; prn MoM and  bisacodyl  LBM before surgery  Increased senna to 2 tabs twice daily, MiraLAX to twice daily  PPI (none PTA)  PRN antiemetics  Hold PTA tums    Renal / Fluids / Electrolytes:   #CKD  BL creat appears to be ~ 1.3-1.4  Current creatinine 1.01  Urine output adequate at 0.5 cc/kg/h  Strict I/O, daily weights, avoid/limit nephrotoxins  eLytes replacement protocol  Replete lytes PRN per protocol  Restart PTA Bumex 1 mg twice daily  Goal net neutral to -500  Hold PTA eplerenone    Endocrine:    #Stress hyperglycemia  Preop A1c 6.1  Discontinue insulin drip  Start high-dose sliding scale insulin  Continue glargine 10u daily  Goal BG <180 for optimal healing  Hold PTA empagliflozin, ozempic    ID / Antibiotics:  #Stress induced leukocytosis, resolved  To complete perioperative regimen, cefazolin 48 hr  Monitor fever curve, WBC, and inflammatory markers as appropriate    Heme:     #Acute blood loss anemia, stable  #Acute blood loss thrombocytopenia, ongoing  No s/sx active bleeding  am CBC   Thrombocytopenic to the 70s, from 140, low concern for HIT at this time  Hgb goal > 7.0  SQH ppx    MSK / Skin:  #Sternotomy  #Surgical Incision  #Osteomyelitis 2/2 charcot foot s/p L transmetatarsal amputation and TMA R foot, 2023  Sternal precautions, incision protocol  PT/OT/CR    Prophylaxis:     Mechanical DVT ppx  Chemical DVT ppx: SQH 5000 TID  PPI    Lines / Tubes / Drains:  RIJ CVC, PA catheter, day 2   Removed 9/12  L radial arterial Line, day 2  Removed 9/12  CTs x4 (2 pleural, 2 meds), day 2  Lou, day 2  Removed 9/12    An ICU checklist was performed on rounds    Disposition:  Transfer to floor    Patient seen, findings and plan discussed with CVICU staff.    Gerson Ro MD  General Surgery, PGY-1  7:40 AM 09/12/24    ** For on call schedules and contact information, please refer to [facility] **        ====================================    TODAY'S PROGRESS  SUBJECTIVE  Patient reports he is feeling much better this morning.   His appetite is improving.  He has not had a bowel movement since surgery.  No significant dyspnea or chest pain.  Pain has been well-controlled.  Numbness resolved in his hands.    OBJECTIVE  1. VITAL SIGNS  Temp:  [98  F (36.7  C)-99.3  F (37.4  C)] 98.6  F (37  C)  Pulse:  [] 97  Resp:  [11-25] 14  MAP:  [67 mmHg-94 mmHg] 79 mmHg  Arterial Line BP: ()/(51-72) 120/59  SpO2:  [88 %-100 %] 98 %  Resp: 14    2. INTAKE/ OUTPUT  I/O last 3 completed shifts:  In: 1872.72 [P.O.:1020; I.V.:852.72]  Out: 1830 [Urine:1370; Chest Tube:460]    3. PHYSICAL EXAMINATION    Awake and alert, no acute distress  Tachycardic in the low 100s, sinus rhythm, normal S1 and S2, no murmurs rubs or gallops, distant heart sounds, warm well-perfused distally, bilateral radial pulses 2+, no significant peripheral edema  Lungs clear to auscultation bilaterally, no wheezing, crackles, stridor, chest wall rise equal and symmetric  Chest tubes in place at -20 suction with serosanguineous drainage, no airleak to coughing or deep breathing, pleural tubes with 250 cc over the last 24, 20 since midnight; mediastinal tubes with 350 cc over the last 24, 50 since midnight  Abdomen soft, nontender, nondistended  Moves all 4 extremities spontaneously, no focal neurologic deficits    4. INVESTIGATIONS  Arterial Blood Gases   Recent Labs   Lab 09/11/24  0337 09/10/24  2356 09/10/24  2232 09/10/24  1953   PH 7.37 7.35 7.34* 7.44   PCO2 44 44 47* 38   PO2 115* 114* 158* 187*   HCO3 25 24 25 26     Complete Blood Count   Recent Labs   Lab 09/12/24  0407 09/12/24  0037 09/11/24  1627 09/11/24 0325 09/10/24  2232   WBC 5.8  --  7.6 12.2* 11.2*   HGB 9.4* 9.8* 10.3* 11.7* 12.0*   PLT 78*  --  94* 140* 151     Basic Metabolic Panel  Recent Labs   Lab 09/12/24  0511 09/12/24  0407 09/12/24  0405 09/12/24  0204 09/11/24  1703 09/11/24  1627 09/11/24  0335 09/11/24  0325 09/11/24  0001 09/10/24  5462   NA  --  138  --   --   --  137  --  138  --  140    POTASSIUM  --  4.0  --   --   --  4.2  --  4.8  --  4.8   CHLORIDE  --  106  --   --   --  104  --  106  --  107   CO2  --  25  --   --   --  23  --  23  --  22   BUN  --  18.1  --   --   --  22.8*  --  22.9*  --  21.9*   CR  --  1.01  --   --   --  1.26*  --  1.25*  --  1.24*   * 86 81 107*   < > 180*   < > 219*   < > 185*    < > = values in this interval not displayed.     Liver Function Tests  Recent Labs   Lab 09/12/24  0407 09/11/24  1627 09/11/24  0325 09/10/24  2232 09/10/24  1730 09/10/24  1730 09/10/24  1602   AST 32 38 47* 48*   < > 44  --    ALT 11 13 13 15   < > 15  --    ALKPHOS 57 63 77 79   < > 83  --    BILITOTAL 0.5 0.5 0.6 0.7   < > 0.6  --    ALBUMIN 3.1* 3.3* 3.5 3.4*   < > 3.3*  --    INR  --   --   --  1.19*  --  1.30* 1.53*    < > = values in this interval not displayed.     Pancreatic Enzymes  No lab results found in last 7 days.    Coagulation Profile  Recent Labs   Lab 09/10/24  2232 09/10/24  1730 09/10/24  1602   INR 1.19* 1.30* 1.53*   PTT 34 32 28     Lactate  No longer following    5. RADIOLOGY  Chest x-ray 9/12/2024  Improving opacities throughout the lung fields, no significant pneumothorax or pleural effusion

## 2024-09-12 NOTE — CONSULTS
Care Management Initial Consult    General Information  Assessment completed with: Patient, Scott  Type of CM/SW Visit: Initial Assessment    Primary Care Provider verified and updated as needed: Yes   Readmission within the last 30 days: no previous admission in last 30 days         Advance Care Planning: Advance Care Planning Reviewed: no concerns identified       Communication Assessment  Patient's communication style: spoken language (English or Bilingual)    Hearing Difficulty or Deaf: no   Wear Glasses or Blind: no    Cognitive  Cognitive/Neuro/Behavioral: .WDL except  Level of Consciousness: alert  Arousal Level: opens eyes spontaneously  Orientation: oriented x 4  Mood/Behavior: anxious, cooperative  Best Language: 0 - No aphasia  Speech: hoarse, spontaneous, logical    Living Environment:   People in home: alone     Current living Arrangements: apartment      Able to return to prior arrangements: yes    Family/Social Support:  Care provided by: self  Provides care for: no one  Marital Status:   Support system: Children, Significant Other       Matt  Description of Support System: Supportive, Involved       Current Resources:   Patient receiving home care services: No     Community Resources: None  Equipment currently used at home: cane, straight (pt stated he has cane and walker at home but doesn't use it)  Supplies currently used at home:      Employment/Financial:  Employment Status: employed full-time        Financial Concerns: none   Referral to Financial Worker: No    Does the patient's insurance plan have a 3 day qualifying hospital stay waiver?  No    Lifestyle & Psychosocial Needs:  Social Determinants of Health     Food Insecurity: Not on file (9/3/2024)   Depression: Not at risk (9/3/2024)    PHQ-2     PHQ-2 Score: 0   Housing Stability: Low Risk  (3/13/2024)    Received from [facility]    Housing Stability     What is your housing situation today?: 3 - I have housing   Tobacco  Use: Medium Risk (9/10/2024)    Patient History     Smoking Tobacco Use: Former     Smokeless Tobacco Use: Never     Passive Exposure: Not on file   Financial Resource Strain: Low Risk  (3/13/2024)    Received from [facility]    Overall Financial Resource Strain (CARDIA)     Difficulty of Paying Living Expenses: Not hard at all   Alcohol Use: Not on file   Transportation Needs: No Transportation Needs (3/13/2024)    Received from [facility]    PRAPARE - Transportation     Lack of Transportation (Medical): No     Lack of Transportation (Non-Medical): No   Physical Activity: Not on File (12/15/2023)    Received from [facility]    Physical Activity     Physical Activity: 0   Interpersonal Safety: Low Risk  (9/10/2024)    Interpersonal Safety     Do you feel physically and emotionally safe where you currently live?: Yes     Within the past 12 months, have you been hit, slapped, kicked or otherwise physically hurt by someone?: No     Within the past 12 months, have you been humiliated or emotionally abused in other ways by your partner or ex-partner?: No   Stress: Not on File (12/15/2023)    Received from [facility]    Stress     Stress: 0   Social Connections: Not on File (12/15/2023)    Received from [facility]    Social Connections     Social Connections and Isolation: 0   Health Literacy: Not on file     Functional Status:  Prior to admission patient needed assistance:   Dependent ADLs:: Independent  Dependent IADLs:: Independent     Mental Health Status:  Mental Health Status: No Current Concerns       Chemical Dependency Status:  Chemical Dependency Status: No Current Concerns        Values/Beliefs:  Spiritual, Cultural Beliefs, Faith Practices, Values that affect care:           Discussed  Partnership in Safe Discharge Planning  document with patient/family: Yes:     Additional Information:  Pt is s/p CABG X 4 on 9/10/24 and extubated the same day.    Care management consulted to complete assessment due to  high risk readmission score.  Assessment completed with pt.  Pt lives alone.  Pt was ind. with all cares, mobility and driving prior hospitalization.  Pt stated he has a cane and a walker but he doesn't use it.  Pt wasn't receiving any home care or community care service prior hospitalization.  RNCC discussed about TCU vs home with home care vs home with OP rehab discharge options.  RNCC informed pt that RNCC/SW will cont. to be available for any care coordination assistance needs.  Pt agreed.     Next Steps:   No discharge assistance need identified at this time.  Anticipated pt will be discharged to home with OP CR once medically stable.    No further care management intervention anticipated at this time.  Please re-consult if further needs arise.  Care management signing off.        Teagan Brannon RN, PHN, BSN  4A and 4E/ ICU  Care Coordinator  Phone: [phone]  Pager: [phone]      SEARCHABLE in [employer] - search CARE COORDINATOR       [place] ([id number]) Saturday & Sunday; ([id number]) [facility] Recognized Holidays     Units: 5A Onc Vocera & 5C Vocera Pager: [phone]    Units: 6B Vocera & 6C Vocera  Pager: [phone]    Units: 7A SOT RNCC Vocera, 7B Med Surg Vocera, & 7C Med Surg Vocera  Pager: [phone]    Units: 6A Vocera & 4A CVICU Vocera, 4C MICU Vocera, and 4E SICU Vocera   Pager: [phone]    Units: 5 Ortho Vocera & 5 Med Surg Vocera  Pager: [phone]    Units: 6 Med Surg Vocera & 8 Med Surg Vocera  Pager [phone]

## 2024-09-12 NOTE — PLAN OF CARE
Major Shift Events:  A&Ox4, able to make needs known, LOWE. PRN atarax given for anxiety. Pain managed with scheduled tylenol and PRN oxy, robaxin, and dilaudid. SR/ST, rates 90s-110s. Back up V-wires capped. MAP goal >65 and SBP goal < 140, weaned off epi @ 0300. Last ANGELLA: CVP 10, PA 35/14, CI 2.7, , SvO2 64. RA while awake and 1-2L NC while asleep. No BM. PRN zofran given for nausea. Insulin gtt off since 0400. Lou in place with adequate UOP.     Plan: Continue with plan of care.    For vital signs and complete assessments, please see documentation flowsheets.           Goal Outcome Evaluation:      Plan of Care Reviewed With: patient    Overall Patient Progress: no changeOverall Patient Progress: no change           Problem: Adult Inpatient Plan of Care  Goal: Plan of Care Review  Description: The Plan of Care Review/Shift note should be completed every shift.  The Outcome Evaluation is a brief statement about your assessment that the patient is improving, declining, or no change.  This information will be displayed automatically on your shift  note.  Outcome: Progressing  Flowsheets (Taken 9/12/2024 2687)  Plan of Care Reviewed With: patient  Overall Patient Progress: no change

## 2024-09-12 NOTE — PLAN OF CARE
ICU End of Shift Summary. Please see flowsheets for vital signs and detailed assessment data.    Patient is alert and oriented to person, place, time, and situation, and is resting between cares. Patient up with contact guard assistance of one to two for transfers. Back-up v-wires in-place. Room air during day, and 1-2 LPM via nasal cannula overnight. Right IJ multi-lumen access catheter remains in-place.     Changes this shift: [place]aren catheter, arterial line, and indwelling urinary catheter removed. Heart rate 90's to 100's. PTA carvedilol restarted at lowest dose, and PTA bumex restarted. Patient up to chair in AM with PT. Tolerance out of bed improved with nausea control via ondansetron PO and prochlorperazine IV. Patient reporting improved analgesia throughout the day, only having received analgesia in AM prior to transfer to chair and again in afternoon. Sliding scale insulin and bowel regimen orders placed by team. Last BM PTA. Appetite improving with nausea control. Significant other, Matt, updated via phone. Right peripheral IV replaced d/t pain and per patient request.     AM Invasive Hemodynamic Monitoring Values   Time CVP PA CI SVR SvO2   0800 6 35/14(21) 3.2 807 68%       Plan: Continue with current plan of care, anticipating removal of CVC prior to transfer to floor, pending bed availability.     Goal Outcome Evaluation:    Plan of Care Reviewed With: patient, significant other    Overall Patient Progress: improving      Yayo Bustillo RN  Critical Care Flex Team

## 2024-09-12 NOTE — PROGRESS NOTES
Physical Therapy Initial Evaluation   09/12/24 0930   Appointment Info   Signing Clinician's Name / Credentials (PT) Renzo Bhatti, PT   Rehab Comments (PT) sternal   General Information   Onset of Illness/Injury or Date of Surgery 09/10/24   Referring Physician Evan Giles MD   Patient/Family Therapy Goals Statement (PT) return home   Pertinent History of Current Problem (include personal factors and/or comorbidities that impact the POC) Scott Donato is a 54 year old male with PMHx asthma, CAD, T2DM c/b charcot foot, CKD, NYHA class III HFrEf s/p 4v CABG (LIMA to LAD, SVG to D1, SVG to PDA, SVG to OM2) on 9/10/2024 by Dr. Giles.   Existing Precautions/Restrictions sternal   Cognition   Affect/Mental Status (Cognition) WFL   Cognitive Status Comments flat affect throughout   Strength (Manual Muscle Testing)   Strength Comments demos antigravity LE strength. Generalized weakness as demonstrated by slow movements and assist required for mobility.   Bed Mobility   Comment, (Bed Mobility) Supine>sit modA x2   Transfers   Comment, (Transfers) Sit>stand min to modA x2   Gait/Stairs (Locomotion)   Comment, (Gait/Stairs) unable to take any steps forward due to weakness/fatigue.   Balance   Balance Comments EOB with SBA. Standing with CGA to [place] x1   Clinical Impression   Criteria for Skilled Therapeutic Intervention Yes, treatment indicated   PT Diagnosis (PT) Impaired functional mobility   Influenced by the following impairments weakness, deconditioning, impaired balance   Functional limitations due to impairments transfers, ambulation   Clinical Presentation (PT Evaluation Complexity) stable   Clinical Presentation Rationale clinical judgment   Clinical Decision Making (Complexity) low complexity   Planned Therapy Interventions (PT) balance training;bed mobility training;gait training;home exercise program;neuromuscular re-education;strengthening   Risk & Benefits of therapy have been explained  evaluation/treatment results reviewed;care plan/treatment goals reviewed;risks/benefits reviewed;current/potential barriers reviewed;participants voiced agreement with care plan;participants included;patient   PT Total Evaluation Time   PT Eval, Low Complexity Minutes (57228) 6   Physical Therapy Goals   PT Frequency 6x/week   PT Predicted Duration/Target Date for Goal Attainment 09/21/24   PT Goals Bed Mobility;Transfers;Gait;PT Goal 1   PT: Bed Mobility Independent;Within precautions;Supine to/from sit   PT: Transfers Independent;Bed to/from chair;Within precautions   PT: Gait Independent;Within precautions;Greater than 200 feet  (LRAD)   PT: Goal 1 Patient to be independent with HEP in order to improve strength and endurance.   PT Discharge Planning   PT Plan pregait and initiate gait with possible FWW, sit<>stands.   PT Discharge Recommendation (DC Rec) home with assist;home with outpatient cardiac rehab   PT Rationale for DC Rec Patient below independent baseline mobility. Currently limited by fatigue, nauseau, deconditioning. Anticipate he will progress well and should be able to DC home with slight assist and eventual outpatient cardiac rehab. Will monitor DC needs as he improves.   PT Brief overview of current status 1-2A pivot transfer

## 2024-09-12 NOTE — PLAN OF CARE
Goal Outcome Evaluation:      Plan of Care Reviewed With: patient    Overall Patient Progress: improvingOverall Patient Progress: improving    Outcome Evaluation: Anticipated pt will be discharge to home with OP CR when medically stable.  RNCC will cont to be available for any RNCC assistance need.

## 2024-09-13 ENCOUNTER — APPOINTMENT (OUTPATIENT)
Dept: PHYSICAL THERAPY | Facility: CLINIC | Age: 54
DRG: 235 | End: 2024-09-13
Attending: THORACIC SURGERY (CARDIOTHORACIC VASCULAR SURGERY)
Payer: COMMERCIAL

## 2024-09-13 ENCOUNTER — APPOINTMENT (OUTPATIENT)
Dept: GENERAL RADIOLOGY | Facility: CLINIC | Age: 54
DRG: 235 | End: 2024-09-13
Payer: COMMERCIAL

## 2024-09-13 LAB
ALBUMIN SERPL BCG-MCNC: 2.9 G/DL (ref 3.5–5.2)
ALP SERPL-CCNC: 56 U/L (ref 40–150)
ALT SERPL W P-5'-P-CCNC: 9 U/L (ref 0–70)
ANION GAP SERPL CALCULATED.3IONS-SCNC: 7 MMOL/L (ref 7–15)
AST SERPL W P-5'-P-CCNC: 26 U/L (ref 0–45)
BILIRUB SERPL-MCNC: 0.4 MG/DL
BUN SERPL-MCNC: 20.4 MG/DL (ref 6–20)
CA-I BLD-MCNC: 4.7 MG/DL (ref 4.4–5.2)
CALCIUM SERPL-MCNC: 7.9 MG/DL (ref 8.8–10.4)
CHLORIDE SERPL-SCNC: 106 MMOL/L (ref 98–107)
CREAT SERPL-MCNC: 1.13 MG/DL (ref 0.67–1.17)
EGFRCR SERPLBLD CKD-EPI 2021: 77 ML/MIN/1.73M2
ERYTHROCYTE [DISTWIDTH] IN BLOOD BY AUTOMATED COUNT: 13.3 % (ref 10–15)
GLUCOSE BLDC GLUCOMTR-MCNC: 117 MG/DL (ref 70–99)
GLUCOSE BLDC GLUCOMTR-MCNC: 129 MG/DL (ref 70–99)
GLUCOSE BLDC GLUCOMTR-MCNC: 139 MG/DL (ref 70–99)
GLUCOSE BLDC GLUCOMTR-MCNC: 143 MG/DL (ref 70–99)
GLUCOSE BLDC GLUCOMTR-MCNC: 197 MG/DL (ref 70–99)
GLUCOSE SERPL-MCNC: 136 MG/DL (ref 70–99)
HCO3 SERPL-SCNC: 26 MMOL/L (ref 22–29)
HCT VFR BLD AUTO: 26.1 % (ref 40–53)
HGB BLD-MCNC: 8.5 G/DL (ref 13.3–17.7)
MAGNESIUM SERPL-MCNC: 2.1 MG/DL (ref 1.7–2.3)
MCH RBC QN AUTO: 31.1 PG (ref 26.5–33)
MCHC RBC AUTO-ENTMCNC: 32.6 G/DL (ref 31.5–36.5)
MCV RBC AUTO: 96 FL (ref 78–100)
PHOSPHATE SERPL-MCNC: 2.6 MG/DL (ref 2.5–4.5)
PLATELET # BLD AUTO: 76 10E3/UL (ref 150–450)
POTASSIUM SERPL-SCNC: 4.1 MMOL/L (ref 3.4–5.3)
PROT SERPL-MCNC: 5.5 G/DL (ref 6.4–8.3)
RBC # BLD AUTO: 2.73 10E6/UL (ref 4.4–5.9)
SODIUM SERPL-SCNC: 139 MMOL/L (ref 135–145)
WBC # BLD AUTO: 5.1 10E3/UL (ref 4–11)

## 2024-09-13 PROCEDURE — 82040 ASSAY OF SERUM ALBUMIN: CPT | Performed by: STUDENT IN AN ORGANIZED HEALTH CARE EDUCATION/TRAINING PROGRAM

## 2024-09-13 PROCEDURE — 71045 X-RAY EXAM CHEST 1 VIEW: CPT

## 2024-09-13 PROCEDURE — 84100 ASSAY OF PHOSPHORUS: CPT | Performed by: STUDENT IN AN ORGANIZED HEALTH CARE EDUCATION/TRAINING PROGRAM

## 2024-09-13 PROCEDURE — 250N000009 HC RX 250: Performed by: NURSE PRACTITIONER

## 2024-09-13 PROCEDURE — 83735 ASSAY OF MAGNESIUM: CPT | Performed by: STUDENT IN AN ORGANIZED HEALTH CARE EDUCATION/TRAINING PROGRAM

## 2024-09-13 PROCEDURE — 250N000013 HC RX MED GY IP 250 OP 250 PS 637: Performed by: NURSE PRACTITIONER

## 2024-09-13 PROCEDURE — 250N000013 HC RX MED GY IP 250 OP 250 PS 637: Performed by: THORACIC SURGERY (CARDIOTHORACIC VASCULAR SURGERY)

## 2024-09-13 PROCEDURE — 82330 ASSAY OF CALCIUM: CPT | Performed by: THORACIC SURGERY (CARDIOTHORACIC VASCULAR SURGERY)

## 2024-09-13 PROCEDURE — 97530 THERAPEUTIC ACTIVITIES: CPT | Mod: GP | Performed by: PHYSICAL THERAPIST

## 2024-09-13 PROCEDURE — 250N000013 HC RX MED GY IP 250 OP 250 PS 637

## 2024-09-13 PROCEDURE — 71045 X-RAY EXAM CHEST 1 VIEW: CPT | Mod: 26 | Performed by: STUDENT IN AN ORGANIZED HEALTH CARE EDUCATION/TRAINING PROGRAM

## 2024-09-13 PROCEDURE — 250N000011 HC RX IP 250 OP 636: Performed by: THORACIC SURGERY (CARDIOTHORACIC VASCULAR SURGERY)

## 2024-09-13 PROCEDURE — 85027 COMPLETE CBC AUTOMATED: CPT | Performed by: STUDENT IN AN ORGANIZED HEALTH CARE EDUCATION/TRAINING PROGRAM

## 2024-09-13 PROCEDURE — 120N000003 HC R&B IMCU UMMC

## 2024-09-13 PROCEDURE — 97116 GAIT TRAINING THERAPY: CPT | Mod: GP | Performed by: PHYSICAL THERAPIST

## 2024-09-13 PROCEDURE — 258N000003 HC RX IP 258 OP 636: Performed by: NURSE PRACTITIONER

## 2024-09-13 PROCEDURE — 250N000013 HC RX MED GY IP 250 OP 250 PS 637: Performed by: PHYSICIAN ASSISTANT

## 2024-09-13 PROCEDURE — 99233 SBSQ HOSP IP/OBS HIGH 50: CPT | Mod: 24 | Performed by: PHYSICIAN ASSISTANT

## 2024-09-13 PROCEDURE — 71045 X-RAY EXAM CHEST 1 VIEW: CPT | Mod: 76

## 2024-09-13 RX ADMIN — HEPARIN SODIUM 5000 UNITS: 5000 INJECTION, SOLUTION INTRAVENOUS; SUBCUTANEOUS at 19:54

## 2024-09-13 RX ADMIN — METHOCARBAMOL 750 MG: 750 TABLET ORAL at 01:14

## 2024-09-13 RX ADMIN — ACETAMINOPHEN 975 MG: 325 TABLET ORAL at 01:14

## 2024-09-13 RX ADMIN — Medication 5 MG: at 21:16

## 2024-09-13 RX ADMIN — SENNOSIDES AND DOCUSATE SODIUM 2 TABLET: 8.6; 5 TABLET ORAL at 08:20

## 2024-09-13 RX ADMIN — PANTOPRAZOLE SODIUM 40 MG: 40 TABLET, DELAYED RELEASE ORAL at 08:19

## 2024-09-13 RX ADMIN — CARVEDILOL 3.12 MG: 3.12 TABLET, FILM COATED ORAL at 19:51

## 2024-09-13 RX ADMIN — HEPARIN SODIUM 5000 UNITS: 5000 INJECTION, SOLUTION INTRAVENOUS; SUBCUTANEOUS at 12:23

## 2024-09-13 RX ADMIN — CARVEDILOL 3.12 MG: 3.12 TABLET, FILM COATED ORAL at 08:20

## 2024-09-13 RX ADMIN — BUMETANIDE 1 MG: 1 TABLET ORAL at 08:20

## 2024-09-13 RX ADMIN — OXYCODONE HYDROCHLORIDE 5 MG: 5 TABLET ORAL at 01:15

## 2024-09-13 RX ADMIN — FLUTICASONE FUROATE AND VILANTEROL TRIFENATATE 1 PUFF: 200; 25 POWDER RESPIRATORY (INHALATION) at 08:22

## 2024-09-13 RX ADMIN — ACETAMINOPHEN 975 MG: 325 TABLET ORAL at 10:26

## 2024-09-13 RX ADMIN — OXYCODONE HYDROCHLORIDE 5 MG: 5 TABLET ORAL at 23:53

## 2024-09-13 RX ADMIN — OXYCODONE HYDROCHLORIDE 5 MG: 5 TABLET ORAL at 18:11

## 2024-09-13 RX ADMIN — POLYETHYLENE GLYCOL 3350 17 G: 17 POWDER, FOR SOLUTION ORAL at 08:19

## 2024-09-13 RX ADMIN — METHOCARBAMOL 750 MG: 750 TABLET ORAL at 08:19

## 2024-09-13 RX ADMIN — ACETAMINOPHEN 650 MG: 325 TABLET ORAL at 19:51

## 2024-09-13 RX ADMIN — HEPARIN SODIUM 5000 UNITS: 5000 INJECTION, SOLUTION INTRAVENOUS; SUBCUTANEOUS at 03:31

## 2024-09-13 RX ADMIN — ASPIRIN 81 MG CHEWABLE TABLET 162 MG: 81 TABLET CHEWABLE at 08:19

## 2024-09-13 RX ADMIN — OXYCODONE HYDROCHLORIDE 5 MG: 5 TABLET ORAL at 08:19

## 2024-09-13 RX ADMIN — ATORVASTATIN CALCIUM 40 MG: 40 TABLET, FILM COATED ORAL at 21:16

## 2024-09-13 RX ADMIN — HYDROXYZINE HYDROCHLORIDE 25 MG: 25 TABLET ORAL at 18:11

## 2024-09-13 RX ADMIN — BUMETANIDE 1 MG: 1 TABLET ORAL at 16:31

## 2024-09-13 RX ADMIN — SENNOSIDES AND DOCUSATE SODIUM 2 TABLET: 8.6; 5 TABLET ORAL at 19:51

## 2024-09-13 RX ADMIN — POTASSIUM PHOSPHATE, MONOBASIC POTASSIUM PHOSPHATE, DIBASIC 9 MMOL: 224; 236 INJECTION, SOLUTION, CONCENTRATE INTRAVENOUS at 05:45

## 2024-09-13 ASSESSMENT — ACTIVITIES OF DAILY LIVING (ADL)
ADLS_ACUITY_SCORE: 32

## 2024-09-13 NOTE — PROGRESS NOTES
Major Shift Events:    Aox4, vss, SR/ST no ectopy, no PRN BP meds needed overnight. Chest tube op minimal no airleaks.   Refuses repositioning overnight. Oxy tylenol and robaxin given for breakthrough 5/10 pain in chest and chest tube sites. Voiding QS via urinal. Floor status  Plan: transfer to floor when bed available  For vital signs and complete assessments, please see documentation flowsheets.

## 2024-09-13 NOTE — PLAN OF CARE
Major Shift Events:      V wires pulled today   Pt a/ox4. Up x1 assist for line management CGA otherwise, a little wobbling at first.prn oxy given for pain management per pt request. Pt remains ST at 100s.on Room air during day shift, required 2L overnight. X2 CT with minimal output, see flowsheets. 2g Na restricted diet. Small BM today. Voiding via urinal.     Plan: transfer to floor when able. Notify md of any acute changes.     For vital signs and complete assessments, please see documentation flowsheets.        Goal Outcome Evaluation:      Plan of Care Reviewed With: patient    Overall Patient Progress: improvingOverall Patient Progress: improving    Outcome Evaluation: Cardiac pacer wires out today. Should get 1 set of CT pulled today as well. CVC pulled today. floor order in. pt had BM today- small but continues to be agreeable for medications.

## 2024-09-13 NOTE — PROGRESS NOTES
CV ICU PROGRESS NOTE    Scott Donato  [id number]  Admitted: 9/10/2024  6:42 AM      ASSESSMENT: Scott Donato is a 54 year old male with PMHx asthma, CAD, T2DM c/b charcot foot, CKD, NYHA class III HFrEf s/p 4v CABG (LIMA to LAD, SVG to D1, SVG to PDA, SVG to OM2) on 9/10/2024 by Dr. Giles.   Patient is de lined and awaiting floor bed.    Today  - Remove med CT  - Scheduled melatonin for improved sleep hygiene  - Discontinue dilaudid has note been using  - Goal Net negative even to 500 mL.  - Remove central line    NOTE: Floor status    PLAN:   Neuro/ pain/ sedation:  #Acute postoperative pain  #EtOH abuse, in remission since 2010  #Anxiety  Scheduled: Tylenol  PRN: Tylenol, oxycodone, Robaxin  Hydroxyzine 25-50 mg every 6 hours PRN  Melatonin for sleep hygiene    Pulmonary care:   # Intermittent asthma  Goal SpO2 > 92%, currently stable on room air.  Encourage IS q15-30 minutes when awake.  PTA breo ellipta and albuterol    Cardiovascular:    #Cardiogenic shock, ongoing  #s/p CABG x4v on 9/10  #HLD  #HFrEF, NYHA class III  #Post op vasoplegia, ongoing  Pre op echo: LV EF 3-35%, inferior RWMA  Post op echo: LV EF 25% on epi, inferior RWMA improving, valves without issue  Goal MAP >65, SBP <140, monitor hemodynamics  Restart Coreg, 3.125 mg twice daily (1/2 PTA dose)  Hold PTA Entresto  ASA 81 mg daily + Atorvastatin 40 daily    GI care / Nutrition  #GERD  Regular diet  Bowel regimen: MiraLAX, senna; prn MoM and bisacodyl  LBM before surgery  Increased senna to 2 tabs twice daily, MiraLAX to twice daily. Milk of mag  PPI (none PTA)  Hold PTA tums    Renal / Fluids / Electrolytes:   #CKD  BL creat appears to be ~ 1.3, stable.  Strict I/O, daily weights, avoid/limit nephrotoxins  Lytes replacement protocol  Restart PTA Bumex 1 mg twice daily  Goal net neutral to -500  Hold PTA eplerenone    Endocrine:    #Stress hyperglycemia  Preop A1c 6.1  high-dose sliding scale insulin + Lantus 10 daily.  Goal BG <180 for  optimal healing  Hold PTA empagliflozin, ozempic    ID / Antibiotics:  #Stress induced leukocytosis, resolved  To complete perioperative regimen, cefazolin 48 hr  Monitor fever curve, WBC, and inflammatory markers as appropriate    Heme:     #Acute blood loss anemia, stable  #Acute blood loss thrombocytopenia, ongoing  No s/sx active bleeding  Thrombocytopenic to the 70s, from 140, low concern for HIT at this time  Hgb goal > 7.0    MSK / Skin:  #Sternotomy  #Surgical Incision  #Osteomyelitis 2/2 charcot foot s/p L transmetatarsal amputation and TMA R foot, 2023  Sternal precautions, incision protocol  PT/OT/CR    Prophylaxis:     DVT: mechanical, SQH  GI: PPI    Lines / Tubes / Drains:  CTs x4 (2 pleurals)  PIV x2    Disposition: Floor status    Patient seen, findings and plan discussed with CVICU staff. 45 minutes spent in management of this patient, excluding any procedures performed same day.     Buck Love PA-C    ====================================    TODAY'S PROGRESS  SUBJECTIVE  Patient continues to have some inspiratory pain. Sleeping poorly but intermittently sleeps throughout day, encourage awake during the day and sleep at night. Melatonin scheduled.     OBJECTIVE  1. VITAL SIGNS  Temp:  [98  F (36.7  C)-98.6  F (37  C)] 98  F (36.7  C)  Pulse:  [] 109  Resp:  [12-20] 16  BP: ()/(61-73) 97/66  MAP:  [68 mmHg-78 mmHg] 68 mmHg  Arterial Line BP: (103-119)/(52-61) 103/52  SpO2:  [87 %-100 %] 99 %  Resp: 16    2. INTAKE/ OUTPUT  I/O last 3 completed shifts:  In: 880 [P.O.:760; I.V.:120]  Out: 2150 [Urine:1720; Chest Tube:430]    3. PHYSICAL EXAMINATION    General:Awake and alert, no acute distress  Neuro: awake, oriented, moves all extremities  Cardiovascular: normal S1 and S2, no murmurs rubs or gallops, distant heart sounds, warm well-perfused distally, bilateral radial pulses 2+, no significant peripheral edema  Pulmonary: Lungs clear to auscultation bilaterally, no wheezing, crackles,  stridor, chest wall rise equal and symmetric  Chest tubes in place at -20 suction with serosanguineous drainage, no airleak t  GI: Abdomen soft, nontender, nondistended  MSK: Moves all 4 extremities spontaneously, no focal neurologic deficits    4. INVESTIGATIONS  Arterial Blood Gases   Recent Labs   Lab 09/11/24  0337 09/10/24  2356 09/10/24  2232 09/10/24  1953   PH 7.37 7.35 7.34* 7.44   PCO2 44 44 47* 38   PO2 115* 114* 158* 187*   HCO3 25 24 25 26     Complete Blood Count   Recent Labs   Lab 09/13/24 0337 09/12/24  0407 09/12/24  0037 09/11/24  1627 09/11/24  0325   WBC 5.1 5.8  --  7.6 12.2*   HGB 8.5* 9.4* 9.8* 10.3* 11.7*   PLT 76* 78*  --  94* 140*     Basic Metabolic Panel  Recent Labs   Lab 09/13/24 0337 09/12/24  2116 09/12/24  1806 09/12/24  0959 09/12/24  0511 09/12/24  0407 09/11/24  1703 09/11/24  1627 09/11/24 0335 09/11/24  0325     --   --   --   --  138  --  137  --  138   POTASSIUM 4.1  --   --   --   --  4.0  --  4.2  --  4.8   CHLORIDE 106  --   --   --   --  106  --  104  --  106   CO2 26  --   --   --   --  25  --  23  --  23   BUN 20.4*  --   --   --   --  18.1  --  22.8*  --  22.9*   CR 1.13  --   --   --   --  1.01  --  1.26*  --  1.25*   * 152* 116* 127*   < > 86   < > 180*   < > 219*    < > = values in this interval not displayed.     Liver Function Tests  Recent Labs   Lab 09/13/24 0337 09/12/24 0407 09/11/24  1627 09/11/24  0325 09/10/24  2232 09/10/24  1730 09/10/24  1730 09/10/24  1602   AST 26 32 38 47* 48*   < > 44  --    ALT 9 11 13 13 15   < > 15  --    ALKPHOS 56 57 63 77 79   < > 83  --    BILITOTAL 0.4 0.5 0.5 0.6 0.7   < > 0.6  --    ALBUMIN 2.9* 3.1* 3.3* 3.5 3.4*   < > 3.3*  --    INR  --   --   --   --  1.19*  --  1.30* 1.53*    < > = values in this interval not displayed.     Pancreatic Enzymes  No lab results found in last 7 days.    Coagulation Profile  Recent Labs   Lab 09/10/24  2232 09/10/24  1730 09/10/24  1602   INR 1.19* 1.30* 1.53*   PTT 34 32  28

## 2024-09-13 NOTE — PROVIDER NOTIFICATION
Per provider, leave in CVC overnight/until bed is available in case central access may become needed.

## 2024-09-14 ENCOUNTER — APPOINTMENT (OUTPATIENT)
Dept: CARDIOLOGY | Facility: CLINIC | Age: 54
DRG: 235 | End: 2024-09-14
Payer: COMMERCIAL

## 2024-09-14 ENCOUNTER — APPOINTMENT (OUTPATIENT)
Dept: OCCUPATIONAL THERAPY | Facility: CLINIC | Age: 54
DRG: 235 | End: 2024-09-14
Attending: THORACIC SURGERY (CARDIOTHORACIC VASCULAR SURGERY)
Payer: COMMERCIAL

## 2024-09-14 ENCOUNTER — APPOINTMENT (OUTPATIENT)
Dept: GENERAL RADIOLOGY | Facility: CLINIC | Age: 54
DRG: 235 | End: 2024-09-14
Payer: COMMERCIAL

## 2024-09-14 LAB
ALBUMIN SERPL BCG-MCNC: 3.2 G/DL (ref 3.5–5.2)
ALP SERPL-CCNC: 79 U/L (ref 40–150)
ALT SERPL W P-5'-P-CCNC: 10 U/L (ref 0–70)
ANION GAP SERPL CALCULATED.3IONS-SCNC: 10 MMOL/L (ref 7–15)
AST SERPL W P-5'-P-CCNC: 24 U/L (ref 0–45)
BILIRUB SERPL-MCNC: 0.5 MG/DL
BUN SERPL-MCNC: 24 MG/DL (ref 6–20)
CA-I BLD-MCNC: 4.4 MG/DL (ref 4.4–5.2)
CALCIUM SERPL-MCNC: 8.3 MG/DL (ref 8.8–10.4)
CHLORIDE SERPL-SCNC: 102 MMOL/L (ref 98–107)
CREAT SERPL-MCNC: 1.22 MG/DL (ref 0.67–1.17)
EGFRCR SERPLBLD CKD-EPI 2021: 70 ML/MIN/1.73M2
ERYTHROCYTE [DISTWIDTH] IN BLOOD BY AUTOMATED COUNT: 13.5 % (ref 10–15)
GLUCOSE BLDC GLUCOMTR-MCNC: 118 MG/DL (ref 70–99)
GLUCOSE BLDC GLUCOMTR-MCNC: 118 MG/DL (ref 70–99)
GLUCOSE BLDC GLUCOMTR-MCNC: 131 MG/DL (ref 70–99)
GLUCOSE BLDC GLUCOMTR-MCNC: 150 MG/DL (ref 70–99)
GLUCOSE SERPL-MCNC: 122 MG/DL (ref 70–99)
HCO3 SERPL-SCNC: 26 MMOL/L (ref 22–29)
HCT VFR BLD AUTO: 27.5 % (ref 40–53)
HGB BLD-MCNC: 9.1 G/DL (ref 13.3–17.7)
LVEF ECHO: NORMAL
MAGNESIUM SERPL-MCNC: 1.8 MG/DL (ref 1.7–2.3)
MCH RBC QN AUTO: 31.1 PG (ref 26.5–33)
MCHC RBC AUTO-ENTMCNC: 33.1 G/DL (ref 31.5–36.5)
MCV RBC AUTO: 94 FL (ref 78–100)
PHOSPHATE SERPL-MCNC: 3.7 MG/DL (ref 2.5–4.5)
PLATELET # BLD AUTO: 101 10E3/UL (ref 150–450)
POTASSIUM SERPL-SCNC: 3.7 MMOL/L (ref 3.4–5.3)
PROT SERPL-MCNC: 6 G/DL (ref 6.4–8.3)
RBC # BLD AUTO: 2.93 10E6/UL (ref 4.4–5.9)
SODIUM SERPL-SCNC: 138 MMOL/L (ref 135–145)
WBC # BLD AUTO: 4.8 10E3/UL (ref 4–11)

## 2024-09-14 PROCEDURE — 84100 ASSAY OF PHOSPHORUS: CPT | Performed by: STUDENT IN AN ORGANIZED HEALTH CARE EDUCATION/TRAINING PROGRAM

## 2024-09-14 PROCEDURE — C8929 TTE W OR WO FOL WCON,DOPPLER: HCPCS

## 2024-09-14 PROCEDURE — 97110 THERAPEUTIC EXERCISES: CPT | Mod: GO | Performed by: OCCUPATIONAL THERAPIST

## 2024-09-14 PROCEDURE — 97535 SELF CARE MNGMENT TRAINING: CPT | Mod: GO | Performed by: OCCUPATIONAL THERAPIST

## 2024-09-14 PROCEDURE — 80053 COMPREHEN METABOLIC PANEL: CPT | Performed by: STUDENT IN AN ORGANIZED HEALTH CARE EDUCATION/TRAINING PROGRAM

## 2024-09-14 PROCEDURE — 250N000013 HC RX MED GY IP 250 OP 250 PS 637

## 2024-09-14 PROCEDURE — 85027 COMPLETE CBC AUTOMATED: CPT | Performed by: STUDENT IN AN ORGANIZED HEALTH CARE EDUCATION/TRAINING PROGRAM

## 2024-09-14 PROCEDURE — 99232 SBSQ HOSP IP/OBS MODERATE 35: CPT | Mod: 24 | Performed by: ANESTHESIOLOGY

## 2024-09-14 PROCEDURE — 250N000013 HC RX MED GY IP 250 OP 250 PS 637: Performed by: NURSE PRACTITIONER

## 2024-09-14 PROCEDURE — 250N000013 HC RX MED GY IP 250 OP 250 PS 637: Performed by: THORACIC SURGERY (CARDIOTHORACIC VASCULAR SURGERY)

## 2024-09-14 PROCEDURE — 255N000002 HC RX 255 OP 636: Performed by: INTERNAL MEDICINE

## 2024-09-14 PROCEDURE — 71045 X-RAY EXAM CHEST 1 VIEW: CPT

## 2024-09-14 PROCEDURE — 93306 TTE W/DOPPLER COMPLETE: CPT | Mod: 26 | Performed by: INTERNAL MEDICINE

## 2024-09-14 PROCEDURE — 71045 X-RAY EXAM CHEST 1 VIEW: CPT | Mod: 26 | Performed by: RADIOLOGY

## 2024-09-14 PROCEDURE — 250N000013 HC RX MED GY IP 250 OP 250 PS 637: Performed by: PHYSICIAN ASSISTANT

## 2024-09-14 PROCEDURE — 83735 ASSAY OF MAGNESIUM: CPT | Performed by: STUDENT IN AN ORGANIZED HEALTH CARE EDUCATION/TRAINING PROGRAM

## 2024-09-14 PROCEDURE — 36415 COLL VENOUS BLD VENIPUNCTURE: CPT | Performed by: THORACIC SURGERY (CARDIOTHORACIC VASCULAR SURGERY)

## 2024-09-14 PROCEDURE — 82330 ASSAY OF CALCIUM: CPT | Performed by: THORACIC SURGERY (CARDIOTHORACIC VASCULAR SURGERY)

## 2024-09-14 PROCEDURE — 999N000208 ECHOCARDIOGRAM COMPLETE

## 2024-09-14 PROCEDURE — 250N000011 HC RX IP 250 OP 636: Performed by: THORACIC SURGERY (CARDIOTHORACIC VASCULAR SURGERY)

## 2024-09-14 PROCEDURE — 120N000005 HC R&B MS OVERFLOW UMMC

## 2024-09-14 RX ORDER — POTASSIUM CHLORIDE 750 MG/1
20 TABLET, EXTENDED RELEASE ORAL ONCE
Status: COMPLETED | OUTPATIENT
Start: 2024-09-14 | End: 2024-09-14

## 2024-09-14 RX ORDER — MAGNESIUM OXIDE 400 MG/1
400 TABLET ORAL EVERY 4 HOURS
Status: COMPLETED | OUTPATIENT
Start: 2024-09-14 | End: 2024-09-14

## 2024-09-14 RX ORDER — BUMETANIDE 1 MG/1
1 TABLET ORAL
Status: DISCONTINUED | OUTPATIENT
Start: 2024-09-14 | End: 2024-09-15

## 2024-09-14 RX ADMIN — FLUTICASONE FUROATE AND VILANTEROL TRIFENATATE 1 PUFF: 200; 25 POWDER RESPIRATORY (INHALATION) at 08:41

## 2024-09-14 RX ADMIN — ACETAMINOPHEN 650 MG: 325 TABLET ORAL at 16:07

## 2024-09-14 RX ADMIN — METHOCARBAMOL 750 MG: 750 TABLET ORAL at 16:07

## 2024-09-14 RX ADMIN — HEPARIN SODIUM 5000 UNITS: 5000 INJECTION, SOLUTION INTRAVENOUS; SUBCUTANEOUS at 21:08

## 2024-09-14 RX ADMIN — ACETAMINOPHEN 650 MG: 325 TABLET ORAL at 05:18

## 2024-09-14 RX ADMIN — HYDROXYZINE HYDROCHLORIDE 25 MG: 25 TABLET ORAL at 08:39

## 2024-09-14 RX ADMIN — OXYCODONE HYDROCHLORIDE 5 MG: 5 TABLET ORAL at 23:09

## 2024-09-14 RX ADMIN — METHOCARBAMOL 750 MG: 750 TABLET ORAL at 23:09

## 2024-09-14 RX ADMIN — HEPARIN SODIUM 5000 UNITS: 5000 INJECTION, SOLUTION INTRAVENOUS; SUBCUTANEOUS at 04:13

## 2024-09-14 RX ADMIN — CARVEDILOL 3.12 MG: 3.12 TABLET, FILM COATED ORAL at 21:09

## 2024-09-14 RX ADMIN — Medication 5 MG: at 21:09

## 2024-09-14 RX ADMIN — METHOCARBAMOL 750 MG: 750 TABLET ORAL at 10:50

## 2024-09-14 RX ADMIN — ACETAMINOPHEN 650 MG: 325 TABLET ORAL at 23:09

## 2024-09-14 RX ADMIN — ATORVASTATIN CALCIUM 40 MG: 40 TABLET, FILM COATED ORAL at 21:08

## 2024-09-14 RX ADMIN — POTASSIUM CHLORIDE 20 MEQ: 750 TABLET, EXTENDED RELEASE ORAL at 05:36

## 2024-09-14 RX ADMIN — PANTOPRAZOLE SODIUM 40 MG: 40 TABLET, DELAYED RELEASE ORAL at 08:38

## 2024-09-14 RX ADMIN — BUMETANIDE 1 MG: 1 TABLET ORAL at 13:37

## 2024-09-14 RX ADMIN — MAGNESIUM OXIDE TAB 400 MG (241.3 MG ELEMENTAL MG) 400 MG: 400 (241.3 MG) TAB at 05:37

## 2024-09-14 RX ADMIN — HEPARIN SODIUM 5000 UNITS: 5000 INJECTION, SOLUTION INTRAVENOUS; SUBCUTANEOUS at 13:37

## 2024-09-14 RX ADMIN — BUMETANIDE 1 MG: 1 TABLET ORAL at 16:07

## 2024-09-14 RX ADMIN — HUMAN ALBUMIN MICROSPHERES AND PERFLUTREN 5 ML: 10; .22 INJECTION, SOLUTION INTRAVENOUS at 09:46

## 2024-09-14 RX ADMIN — MAGNESIUM OXIDE TAB 400 MG (241.3 MG ELEMENTAL MG) 400 MG: 400 (241.3 MG) TAB at 10:50

## 2024-09-14 RX ADMIN — CARVEDILOL 3.12 MG: 3.12 TABLET, FILM COATED ORAL at 08:38

## 2024-09-14 RX ADMIN — OXYCODONE HYDROCHLORIDE 5 MG: 5 TABLET ORAL at 10:50

## 2024-09-14 RX ADMIN — ASPIRIN 81 MG CHEWABLE TABLET 162 MG: 81 TABLET CHEWABLE at 08:39

## 2024-09-14 ASSESSMENT — ACTIVITIES OF DAILY LIVING (ADL)
ADLS_ACUITY_SCORE: 32
ADLS_ACUITY_SCORE: 30
ADLS_ACUITY_SCORE: 32
ADLS_ACUITY_SCORE: 30
ADLS_ACUITY_SCORE: 32

## 2024-09-14 NOTE — PLAN OF CARE
Discharge Planning Assessment  [facility]     Patient Name: Nedra Tolliver  MRN: [id number]  Today's Date: 10/1/2020    Admit Date: 9/30/2020    Discharge Needs Assessment     Row Name 10/01/20 1154       Living Environment    Lives With  alone    Unique Family Situation  Pt has supportive children that assist    Current Living Arrangements  home/apartment/condo    Primary Care Provided by  self    Provides Primary Care For  no one    Family Caregiver if Needed  child(lashonda), adult    Family Caregiver Names  Son/Dtr    Quality of Family Relationships  helpful;involved    Able to Return to Prior Arrangements  yes       Resource/Environmental Concerns    Resource/Environmental Concerns  none    Transportation Concerns  car, none       Transition Planning    Patient/Family Anticipates Transition to  home with help/services    Patient/Family Anticipated Services at Transition  home health care    Transportation Anticipated  family or friend will provide       Discharge Needs Assessment    Readmission Within the Last 30 Days  no previous admission in last 30 days    Equipment Currently Used at Home  walker, standard    Concerns to be Addressed  denies needs/concerns at this time    Anticipated Changes Related to Illness  none    Equipment Needed After Discharge  walker, rolling    Outpatient/Agency/Support Group Needs  homecare agency    Discharge Facility/Level of Care Needs  home with home health    Provided Post Acute Provider List?  Yes    Post Acute Provider List  Home Health    Delivered To  Patient    Method of Delivery  In person    Discharge Coordination/Progress  Pt lives at home alone and plans to dc home. Pt states she has a son and daughter nearby that assist. Pt is requesting HH at [facility]. Pt lives in [facility] is only provider for [facility]. Will need HH orders to arrange. Pt states she has a standard walker at home but will need a rolling walker at [facility]. Pt has RX coverage and PCP        Discharge  Major Shift Events:      Pt a/ox4. Up xCGA for line management. Prn tylenol/robax/atarax/oxy given for pain per pt request. Remains ST 100s. Room air during day. X2 chest tubes with minimal output. BM today. Voiding via urinal and up to bathroom.    Plan: transfer to floor when able. Notify md of acute changes.     For vital signs and complete assessments, please see documentation flowsheets.      Goal Outcome Evaluation:      Plan of Care Reviewed With: patient    Overall Patient Progress: improvingOverall Patient Progress: improving    Outcome Evaluation: possible discussion of removal of all chest tubes today. up with little assist. needing prn meds for pain and anxiety upon request. BP WNL       Plan    No documentation.       Continued Care and Services - Admitted Since 9/30/2020    Coordination has not been started for this encounter.         Demographic Summary    No documentation.       Functional Status    No documentation.       Psychosocial    No documentation.       Abuse/Neglect    No documentation.       Legal    No documentation.       Substance Abuse    No documentation.       Patient Forms    No documentation.           KONG Hsu

## 2024-09-14 NOTE — PROGRESS NOTES
CV ICU PROGRESS NOTE    Scott Donato  [id number]  Admitted: 9/10/2024  6:42 AM      ASSESSMENT: Scott Donato is a 54 year old male with PMHx asthma, CAD, T2DM c/b charcot foot, CKD, NYHA class III HFrEf s/p 4v CABG (LIMA to LAD, SVG to D1, SVG to PDA, SVG to OM2) on 9/10/2024 by Dr. Giles.     CO-MORBIDITIES:   Patient Active Problem List   Diagnosis    Coronary artery disease     Today's changes:  Febrile overnight x 1, white count downtrending, no overt signs of infection, continue to monitor  Continue Coreg  Weaning oxygen as able  Keep chest tubes in place  Transfer to floor    PLAN:   Neuro/ pain/ sedation:  #Acute postoperative pain  #EtOH abuse, in remission since 2010  #Bilateral hand tingling, resolved  #Anxiety  Scheduled: Tylenol and melatonin  PRN: Tylenol, oxycodone, Dilaudid, Robaxin  Hydroxyzine 25-50 mg every 6 hours as needed for anxiety    Pulmonary care:   #Mechanical ventilation, s/p extubation 9/10  #Intermittent asthma, stable  Goal SpO2 > 92%, currently stable on room air  Encourage IS q15-30 minutes when awake.  Daily CXR while chest tubes in place  PTA breo ellipta and albuterol  Hold PTA cetirizine    Cardiovascular:    #Cardiogenic shock, resolved  #s/p CABG x4v on 9/10  #HLD  #HFrEF, NYHA class III  #Post op vasoplegia, ongoing  Pre op echo: LV EF 3-35%, inferior RWMA  Post op echo: LV EF 25% on epi, inferior RWMA improving, valves without issue  Off epi since 9/12 AM  Goal MAP >65, SBP <140, monitor hemodynamics  Wires and mediastinal tubes removed 9/13  Coreg 3.125 twice daily  Hold PTA Entresto  ASA 81 mg daily  Atorvastatin 40 mg daily  Hydralazine 10 mg every 30 minute as needed for SBP greater than 140    GI care / Nutrition:   #GERD  #Nausea, resolved  Regular diet  Bowel regimen: MiraLAX, senna; prn MoM and bisacodyl  PPI (none PTA)  PRN antiemetics  Hold PTA tums    Renal / Fluids / Electrolytes:   #CKD  BL creat appears to be ~ 1.3-1.4  Current creatinine 1.2  Strict  I/O, daily weights, avoid/limit nephrotoxins  eLytes replacement protocol  Replete lytes PRN per protocol  PTA Bumex 1 mg twice daily  Hold PTA eplerenone    Endocrine:    #Stress hyperglycemia, resolved  Preop A1c 6.1  High-dose sliding scale insulin (0 units / 24 hours)  Goal BG <180 for optimal healing  Hold PTA empagliflozin, ozempic    ID / Antibiotics:  #Stress induced leukocytosis, resolved  To complete perioperative regimen, cefazolin 48 hr  Monitor fever curve, WBC, and inflammator  Febrile x 1 overnight 9/y markers as appropriate    Heme:     #Acute blood loss anemia, stable  #Acute blood loss thrombocytopenia, ongoing  No s/sx active bleeding  am CBC   Platelet ernie at 76, improved to 101 this morning, no concern for HIT  Hgb goal > 7.0  SQH ppx    MSK / Skin:  #Sternotomy  #Surgical Incision  #Osteomyelitis 2/2 charcot foot s/p L transmetatarsal amputation and TMA R foot, 2023  Sternal precautions, incision protocol  PT/OT/CR    Prophylaxis:     Mechanical DVT ppx  Chemical DVT ppx: SQH 5000 TID  PPI    Lines / Tubes / Drains:  CTs x2 (2 pleural), day 4    An ICU checklist was performed on rounds    Disposition:  Transfer to floor    Patient seen, findings and plan discussed with CVICU staff.    Gerson Ro MD  General Surgery, PGY-1  2:37 PM 09/14/24    ** For on call schedules and contact information, please refer to [facility] **         ====================================    TODAY'S PROGRESS  SUBJECTIVE  Pain improved. Eating is going better. Passing gas. Able to get up and out of bed yesterday. Large BM yesterday.    OBJECTIVE  1. VITAL SIGNS  Temp:  [97.5  F (36.4  C)-101  F (38.3  C)] 98.1  F (36.7  C)  Pulse:  [] 99  Resp:  [11-18] 11  BP: ()/(69-79) 103/71  SpO2:  [88 %-100 %] 93 %  Resp: 11    2. INTAKE/ OUTPUT  I/O last 3 completed shifts:  In: 1920 [P.O.:1920]  Out: 1515 [Urine:1175; Chest Tube:340]    3. PHYSICAL EXAMINATION    Awake and alert, no acute distress  Lungs clear to  auscultation, chest wall rise equal and symmetric, no wheezing, crackles, stridor  Chest tubes to -20 suction, serosanguinous drainage, no airleak  Regular rate and rhythm, warm and well perfused, bilateral radial 2+  Abdomen soft, non distended, non tender  Moves all four extremities  S/p transmetatarsal amputation    4. INVESTIGATIONS  Arterial Blood Gases   Recent Labs   Lab 09/11/24  0337 09/10/24  2356 09/10/24  2232 09/10/24  1953   PH 7.37 7.35 7.34* 7.44   PCO2 44 44 47* 38   PO2 115* 114* 158* 187*   HCO3 25 24 25 26     Complete Blood Count   Recent Labs   Lab 09/14/24 0441 09/13/24 0337 09/12/24 0407 09/12/24 0037 09/11/24  1627   WBC 4.8 5.1 5.8  --  7.6   HGB 9.1* 8.5* 9.4* 9.8* 10.3*   * 76* 78*  --  94*     Basic Metabolic Panel  Recent Labs   Lab 09/14/24 0838 09/14/24 0441 09/14/24 0408 09/13/24 2352 09/13/24 0817 09/13/24 0337 09/12/24  0511 09/12/24 0407 09/11/24  1703 09/11/24 1627   NA  --  138  --   --   --  139  --  138  --  137   POTASSIUM  --  3.7  --   --   --  4.1  --  4.0  --  4.2   CHLORIDE  --  102  --   --   --  106  --  106  --  104   CO2  --  26  --   --   --  26  --  25  --  23   BUN  --  24.0*  --   --   --  20.4*  --  18.1  --  22.8*   CR  --  1.22*  --   --   --  1.13  --  1.01  --  1.26*   * 122* 118* 129*   < > 136*   < > 86   < > 180*    < > = values in this interval not displayed.     Liver Function Tests  Recent Labs   Lab 09/14/24 0441 09/13/24 0337 09/12/24 0407 09/11/24 1627 09/11/24  0325 09/10/24  2232 09/10/24  1730 09/10/24  1730 09/10/24  1602   AST 24 26 32 38   < > 48*   < > 44  --    ALT 10 9 11 13   < > 15   < > 15  --    ALKPHOS 79 56 57 63   < > 79   < > 83  --    BILITOTAL 0.5 0.4 0.5 0.5   < > 0.7   < > 0.6  --    ALBUMIN 3.2* 2.9* 3.1* 3.3*   < > 3.4*   < > 3.3*  --    INR  --   --   --   --   --  1.19*  --  1.30* 1.53*    < > = values in this interval not displayed.     Pancreatic Enzymes  No lab results found in last 7  days.    Coagulation Profile  Recent Labs   Lab 09/10/24  2232 09/10/24  1730 09/10/24  1602   INR 1.19* 1.30* 1.53*   PTT 34 32 28     Lactate  No longer following    5. RADIOLOGY  CXR without focal opacity, no pleural effusion or PTX

## 2024-09-14 NOTE — PLAN OF CARE
Major Shift Events:  No acute events overnight.  Neuro: Intact. A&O4. Calls appropriately. Pupils E/R. 5/5 strength in all extremities. SBA when ambulating.   CV: SR/ST. SBP < 140. Tmax 101, tylenol given, returned to normal, MD aware.   Resp: RA while awake. Desats to 88 overnight, placed on 2L via NC. LS clear bilaterally.  GI: Regular diet, tolerating. One large loose/soft BM overnight. Passing gas.   : Voiding with urinal, adequate output.  Pain: Pain controlled with PRN oxy, tylenol, robaxin, atarax.   Skin/Drains: Midsternal incision GORDO. R internal jugular site. Old chest tube sites.   Lines/Drips: 2 pleural CT, R PIV. Mag and K replaced.  Plan: 6C orders. Pain control, increase activity, continue bowel regimen.   For vital signs and complete assessments, please see documentation flowsheets.   Goal Outcome Evaluation:      Plan of Care Reviewed With: patient    Overall Patient Progress: improvingOverall Patient Progress: improving    Outcome Evaluation: Floor orders. Pain well controlled. BM overnight.

## 2024-09-15 ENCOUNTER — APPOINTMENT (OUTPATIENT)
Dept: OCCUPATIONAL THERAPY | Facility: CLINIC | Age: 54
DRG: 235 | End: 2024-09-15
Attending: THORACIC SURGERY (CARDIOTHORACIC VASCULAR SURGERY)
Payer: COMMERCIAL

## 2024-09-15 ENCOUNTER — APPOINTMENT (OUTPATIENT)
Dept: GENERAL RADIOLOGY | Facility: CLINIC | Age: 54
DRG: 235 | End: 2024-09-15
Payer: COMMERCIAL

## 2024-09-15 ENCOUNTER — APPOINTMENT (OUTPATIENT)
Dept: PHYSICAL THERAPY | Facility: CLINIC | Age: 54
DRG: 235 | End: 2024-09-15
Attending: THORACIC SURGERY (CARDIOTHORACIC VASCULAR SURGERY)
Payer: COMMERCIAL

## 2024-09-15 LAB
ALBUMIN SERPL BCG-MCNC: 3 G/DL (ref 3.5–5.2)
ALP SERPL-CCNC: 107 U/L (ref 40–150)
ALT SERPL W P-5'-P-CCNC: 6 U/L (ref 0–70)
ANION GAP SERPL CALCULATED.3IONS-SCNC: 8 MMOL/L (ref 7–15)
AST SERPL W P-5'-P-CCNC: 23 U/L (ref 0–45)
BILIRUB SERPL-MCNC: 0.6 MG/DL
BUN SERPL-MCNC: 19.9 MG/DL (ref 6–20)
CA-I BLD-MCNC: 4.4 MG/DL (ref 4.4–5.2)
CALCIUM SERPL-MCNC: 8.2 MG/DL (ref 8.8–10.4)
CHLORIDE SERPL-SCNC: 102 MMOL/L (ref 98–107)
CREAT SERPL-MCNC: 1.11 MG/DL (ref 0.67–1.17)
EGFRCR SERPLBLD CKD-EPI 2021: 78 ML/MIN/1.73M2
ERYTHROCYTE [DISTWIDTH] IN BLOOD BY AUTOMATED COUNT: 13.6 % (ref 10–15)
GLUCOSE BLDC GLUCOMTR-MCNC: 115 MG/DL (ref 70–99)
GLUCOSE BLDC GLUCOMTR-MCNC: 120 MG/DL (ref 70–99)
GLUCOSE BLDC GLUCOMTR-MCNC: 152 MG/DL (ref 70–99)
GLUCOSE SERPL-MCNC: 128 MG/DL (ref 70–99)
HCO3 SERPL-SCNC: 27 MMOL/L (ref 22–29)
HCT VFR BLD AUTO: 26.3 % (ref 40–53)
HGB BLD-MCNC: 8.6 G/DL (ref 13.3–17.7)
MAGNESIUM SERPL-MCNC: 1.9 MG/DL (ref 1.7–2.3)
MCH RBC QN AUTO: 31.3 PG (ref 26.5–33)
MCHC RBC AUTO-ENTMCNC: 32.7 G/DL (ref 31.5–36.5)
MCV RBC AUTO: 96 FL (ref 78–100)
PHOSPHATE SERPL-MCNC: 3.5 MG/DL (ref 2.5–4.5)
PLATELET # BLD AUTO: 111 10E3/UL (ref 150–450)
POTASSIUM SERPL-SCNC: 3.6 MMOL/L (ref 3.4–5.3)
PROT SERPL-MCNC: 5.8 G/DL (ref 6.4–8.3)
RBC # BLD AUTO: 2.75 10E6/UL (ref 4.4–5.9)
SODIUM SERPL-SCNC: 137 MMOL/L (ref 135–145)
WBC # BLD AUTO: 4.1 10E3/UL (ref 4–11)

## 2024-09-15 PROCEDURE — 250N000013 HC RX MED GY IP 250 OP 250 PS 637: Performed by: PHYSICIAN ASSISTANT

## 2024-09-15 PROCEDURE — 250N000013 HC RX MED GY IP 250 OP 250 PS 637: Performed by: THORACIC SURGERY (CARDIOTHORACIC VASCULAR SURGERY)

## 2024-09-15 PROCEDURE — 97110 THERAPEUTIC EXERCISES: CPT | Mod: GO

## 2024-09-15 PROCEDURE — 71045 X-RAY EXAM CHEST 1 VIEW: CPT | Mod: 26 | Performed by: RADIOLOGY

## 2024-09-15 PROCEDURE — 84100 ASSAY OF PHOSPHORUS: CPT | Performed by: STUDENT IN AN ORGANIZED HEALTH CARE EDUCATION/TRAINING PROGRAM

## 2024-09-15 PROCEDURE — 97116 GAIT TRAINING THERAPY: CPT | Mod: GP

## 2024-09-15 PROCEDURE — 80053 COMPREHEN METABOLIC PANEL: CPT | Performed by: STUDENT IN AN ORGANIZED HEALTH CARE EDUCATION/TRAINING PROGRAM

## 2024-09-15 PROCEDURE — 71045 X-RAY EXAM CHEST 1 VIEW: CPT

## 2024-09-15 PROCEDURE — 83735 ASSAY OF MAGNESIUM: CPT | Performed by: STUDENT IN AN ORGANIZED HEALTH CARE EDUCATION/TRAINING PROGRAM

## 2024-09-15 PROCEDURE — 82330 ASSAY OF CALCIUM: CPT | Performed by: THORACIC SURGERY (CARDIOTHORACIC VASCULAR SURGERY)

## 2024-09-15 PROCEDURE — 250N000013 HC RX MED GY IP 250 OP 250 PS 637

## 2024-09-15 PROCEDURE — 250N000013 HC RX MED GY IP 250 OP 250 PS 637: Performed by: NURSE PRACTITIONER

## 2024-09-15 PROCEDURE — 36415 COLL VENOUS BLD VENIPUNCTURE: CPT | Performed by: THORACIC SURGERY (CARDIOTHORACIC VASCULAR SURGERY)

## 2024-09-15 PROCEDURE — 85027 COMPLETE CBC AUTOMATED: CPT | Performed by: STUDENT IN AN ORGANIZED HEALTH CARE EDUCATION/TRAINING PROGRAM

## 2024-09-15 PROCEDURE — 97530 THERAPEUTIC ACTIVITIES: CPT | Mod: GO

## 2024-09-15 PROCEDURE — 97535 SELF CARE MNGMENT TRAINING: CPT | Mod: GO

## 2024-09-15 PROCEDURE — 120N000003 HC R&B IMCU UMMC

## 2024-09-15 PROCEDURE — 250N000011 HC RX IP 250 OP 636: Performed by: THORACIC SURGERY (CARDIOTHORACIC VASCULAR SURGERY)

## 2024-09-15 RX ORDER — BUMETANIDE 2 MG/1
2 TABLET ORAL
Status: DISCONTINUED | OUTPATIENT
Start: 2024-09-15 | End: 2024-09-16 | Stop reason: HOSPADM

## 2024-09-15 RX ORDER — POTASSIUM CHLORIDE 750 MG/1
20 TABLET, EXTENDED RELEASE ORAL ONCE
Status: COMPLETED | OUTPATIENT
Start: 2024-09-15 | End: 2024-09-15

## 2024-09-15 RX ORDER — MAGNESIUM OXIDE 400 MG/1
400 TABLET ORAL EVERY 4 HOURS
Status: COMPLETED | OUTPATIENT
Start: 2024-09-15 | End: 2024-09-15

## 2024-09-15 RX ADMIN — BUMETANIDE 2 MG: 2 TABLET ORAL at 09:15

## 2024-09-15 RX ADMIN — SENNOSIDES AND DOCUSATE SODIUM 2 TABLET: 8.6; 5 TABLET ORAL at 09:18

## 2024-09-15 RX ADMIN — CARVEDILOL 3.12 MG: 3.12 TABLET, FILM COATED ORAL at 19:59

## 2024-09-15 RX ADMIN — POTASSIUM CHLORIDE 20 MEQ: 750 TABLET, EXTENDED RELEASE ORAL at 09:15

## 2024-09-15 RX ADMIN — BUMETANIDE 2 MG: 2 TABLET ORAL at 16:37

## 2024-09-15 RX ADMIN — HEPARIN SODIUM 5000 UNITS: 5000 INJECTION, SOLUTION INTRAVENOUS; SUBCUTANEOUS at 19:58

## 2024-09-15 RX ADMIN — SACUBITRIL AND VALSARTAN 1 TABLET: 24; 26 TABLET, FILM COATED ORAL at 19:58

## 2024-09-15 RX ADMIN — Medication 5 MG: at 23:02

## 2024-09-15 RX ADMIN — METHOCARBAMOL 750 MG: 750 TABLET ORAL at 23:02

## 2024-09-15 RX ADMIN — HYDROXYZINE HYDROCHLORIDE 25 MG: 25 TABLET ORAL at 04:15

## 2024-09-15 RX ADMIN — HEPARIN SODIUM 5000 UNITS: 5000 INJECTION, SOLUTION INTRAVENOUS; SUBCUTANEOUS at 12:34

## 2024-09-15 RX ADMIN — ACETAMINOPHEN 650 MG: 325 TABLET ORAL at 20:07

## 2024-09-15 RX ADMIN — MAGNESIUM OXIDE TAB 400 MG (241.3 MG ELEMENTAL MG) 400 MG: 400 (241.3 MG) TAB at 09:33

## 2024-09-15 RX ADMIN — SACUBITRIL AND VALSARTAN 1 TABLET: 24; 26 TABLET, FILM COATED ORAL at 14:59

## 2024-09-15 RX ADMIN — SENNOSIDES AND DOCUSATE SODIUM 1 TABLET: 8.6; 5 TABLET ORAL at 19:59

## 2024-09-15 RX ADMIN — ATORVASTATIN CALCIUM 40 MG: 40 TABLET, FILM COATED ORAL at 19:59

## 2024-09-15 RX ADMIN — FLUTICASONE FUROATE AND VILANTEROL TRIFENATATE 1 PUFF: 200; 25 POWDER RESPIRATORY (INHALATION) at 09:16

## 2024-09-15 RX ADMIN — ACETAMINOPHEN 650 MG: 325 TABLET ORAL at 04:21

## 2024-09-15 RX ADMIN — EMPAGLIFLOZIN 10 MG: 10 TABLET, FILM COATED ORAL at 09:15

## 2024-09-15 RX ADMIN — PANTOPRAZOLE SODIUM 40 MG: 40 TABLET, DELAYED RELEASE ORAL at 09:15

## 2024-09-15 RX ADMIN — ACETAMINOPHEN 650 MG: 325 TABLET ORAL at 14:55

## 2024-09-15 RX ADMIN — OXYCODONE HYDROCHLORIDE 5 MG: 5 TABLET ORAL at 20:07

## 2024-09-15 RX ADMIN — MAGNESIUM OXIDE TAB 400 MG (241.3 MG ELEMENTAL MG) 400 MG: 400 (241.3 MG) TAB at 12:34

## 2024-09-15 RX ADMIN — CARVEDILOL 3.12 MG: 3.12 TABLET, FILM COATED ORAL at 09:15

## 2024-09-15 RX ADMIN — HEPARIN SODIUM 5000 UNITS: 5000 INJECTION, SOLUTION INTRAVENOUS; SUBCUTANEOUS at 04:15

## 2024-09-15 RX ADMIN — ASPIRIN 81 MG CHEWABLE TABLET 162 MG: 81 TABLET CHEWABLE at 09:15

## 2024-09-15 ASSESSMENT — ACTIVITIES OF DAILY LIVING (ADL)
ADLS_ACUITY_SCORE: 30

## 2024-09-15 NOTE — PROGRESS NOTES
Cardiovascular Surgery Progress Note  09/15/2024         Assessment and Plan:      Scott Donato is a 54 year old male with PMHx asthma, CAD, T2DM c/b charcot foot, CKD, NYHA class III HFrEf s/p 4v CABG (LIMA to LAD, SVG to D1, SVG to PDA, SVG to OM2) on 9/10/2024 by Dr. Giles.     Cardiovascular:   Severe multivessel CAD s/p CABG x4  HFrEF  HLD  HD stable, in NSR/ST.  Most recent echo demonstrated LVEF 30-35%  Postop echo demonstrated LVEF 35-40%, mild RV dysfunction  IABP removed on POD 1   mg daily  Carvedilol 3.125 mg BID  Start half dose of PTA entresto  Start 10 mg jardiance daily (PTA dose 25 mg daily)  Atorvastatin 40 mg daily  Diuresis as below    Chest tubes: removed 9/15  TPW: removed 9/13    Pulmonary:  Intermittent asthma, stable  Extubated POD 0. Now saturating well on 2 lpm.   Supplemental O2 PRN to keep sats > 92%. Wean off as tolerated.  Continue aggressive pulm hygiene, IS, activity and deep breathing  CXR in AM    Neurology / MSK:  Acute post-operative pain  Hx EtOH abuse in remission since 2010  Anxiety   Pain well controlled with current regimen  PRN atarax available  Melatonin at HS     / Renal / Fluid / Electrolytes:  Hypervolemia, resolved  CKD  BL creat ~ 1.3-1.4, most recent creatinine stable; adequate UOP.   FLUID STATUS: Pre-op weight 238 lbs, weight today 236 lbs; I/O past 24 hours: net even  Diuresis: Bumex 2 mg BID. Restarted PTA jardiance, entresto as above  Replete lytes per protocol  Strict I/O, daily weights  Avoid/limit nephrotoxins as able    GI / Nutrition:   Risk for protein calorie malnutrition  Tolerating regular diet, supplements  Continue bowel regimen, last BM 9/14    Endocrine:  Stress induced hyperglycemia   Hgb A1c 6.1%  Initially managed on insulin drip postop, transitioned to sliding scale  Goal BG <180 for optimal healing  PTA ozempic on hold    Infectious Disease:  Stress induced leukocytosis  WBC WNL, remains afebrile, no signs or symptoms of  "infection  Completed perioperative antibiotics  Continue to monitor fever curve, CBC    Hematology:   Acute blood loss anemia  Acute blood loss thrombocytopenia  Hgb/plts stable, no signs or symptoms of active bleeding  CBC in AM    Anticoagulation:    mg daily    MSK/Skin:  Sternotomy  Surgical incision  Sternal precautions  Incisional cares per protocol    Prophylaxis:   Stress ulcer prophylaxis: Pantoprazole 40 mg daily  DVT prophylaxis: Subcutaneous heparin, SCD    Medically Ready for Discharge: Anticipated Tomorrow       Clinically Significant Risk Factors              # Hypoalbuminemia: Lowest albumin = 2.9 g/dL at 9/13/2024  3:37 AM, will monitor as appropriate   # Thrombocytopenia: Lowest platelets = 101 in last 2 days, will monitor for bleeding    # Chronic heart failure with reduced ejection fraction: last echo with EF <40%          # Obesity: Estimated body mass index is 30.39 kg/m  as calculated from the following:    Height as of this encounter: 1.88 m (6' 2\").    Weight as of this encounter: 107.4 kg (236 lb 11.2 oz).      # Financial/Environmental Concerns: none   # History of CABG: noted on surgical history               Disposition:   Transferred to  on 9/14  Therapies recommending discharge to home with OP CR    Dr. Giles has been informed of changes through both verbal and written communication.      TRACY Diggs, ACNPC-AG, CCRN  Nurse Practitioner  Cardiothoracic Surgery  Pager: [phone]        Interval History:     No overnight events.  States pain is well managed on current regimen. Slept well overnight.  Tolerating diet, is passing flatus, + BM. No nausea or vomiting.  Breathing well without complaints.   Working with therapies and ambulating in halls with assistance.   Denies chest pain, palpitations, dizziness, syncopal symptoms, fevers, chills, myalgias, or sternal popping/clicking.         Physical Exam:     Gen: A&Ox4, NAD  Neuro: Intact with no focal deficits   CV: " "RRR, normal S1 S2, no murmurs, rubs or gallops. no JVD  Pulm: CTA, no wheezing or rhonchi, normal breathing on NC  Abd: nondistended, normal BS, soft, nontender  Ext: no peripheral edema, no pitting  Incision: clean, dry, intact, no erythema, sternum stable  Tubes/drain sites: dressing clean and dry, serosanguinous output, no air leak         Data:    /74 (BP Location: Right arm)   Pulse 104   Temp 98  F (36.7  C) (Oral)   Resp 20   Ht 1.88 m (6' 2\")   Wt 107.4 kg (236 lb 11.2 oz)   SpO2 98%   BMI 30.39 kg/m         Intake/Output Summary (Last 24 hours) at 9/15/2024 1403  Last data filed at 9/15/2024 0900  Gross per 24 hour   Intake 1320 ml   Output 595 ml   Net 725 ml        Imaging:  reviewed recent imaging, no acute concerns    Recent Results (from the past 24 hour(s))   XR Chest Port 1 View    Narrative    EXAM: XR CHEST PORT 1 VIEW  9/15/2024 9:23 AM      HISTORY: daily cxr with chest tubes    COMPARISON: 9/14/2024    FINDINGS: Single view of the chest. Bibasilar chest tubes and  mediastinal drain. Postoperative changes in the chest and intact  median sternotomy wires. Stable cardiac silhouette. Ongoing streaky  perihilar and left greater than right basilar opacities. No  significant pleural effusion or pneumothorax.      Impression    IMPRESSION: Stable streaky perihilar and left greater than right  basilar opacities, likely atelectasis or mild edema.    I have personally reviewed the examination and initial interpretation  and I agree with the findings.    NANCY YATES MD         SYSTEM ID:  [id number]        Labs:  Recent Labs   Lab 09/15/24  1231 09/15/24  0618 09/14/24  2108 09/14/24  0838 09/14/24  0441 09/13/24  0817 09/13/24  0337 09/11/24  0001 09/10/24  2232 09/10/24  1823 09/10/24  1730 09/10/24  1615 09/10/24  1602   WBC  --  4.1  --   --  4.8  --  5.1   < > 11.2*  --  12.0*   < >  --    HGB  --  8.6*  --   --  9.1*  --  8.5*   < > 12.0*  --  12.0*   < >  --    MCV  --  96  --   --  " 94  --  96   < > 92  --  92   < >  --    PLT  --  111*  --   --  101*  --  76*   < > 151  --  127*  --  121*   INR  --   --   --   --   --   --   --   --  1.19*  --  1.30*  --  1.53*   NA  --  137  --   --  138  --  139   < > 140  --  140   < >  --    POTASSIUM  --  3.6  --   --  3.7  --  4.1   < > 4.8  --  4.3   < >  --    CHLORIDE  --  102  --   --  102  --  106   < > 107  --  107  --   --    CO2  --  27  --   --  26  --  26   < > 22  --  24  --   --    BUN  --  19.9  --   --  24.0*  --  20.4*   < > 21.9*  --  21.2*  --   --    CR  --  1.11  --   --  1.22*  --  1.13   < > 1.24*  --  1.25*  --   --    ANIONGAP  --  8  --   --  10  --  7   < > 11  --  9  --   --    ANITA  --  8.2*  --   --  8.3*  --  7.9*   < > 8.4*  --  8.2*  --   --    * 128* 150*   < > 122*   < > 136*   < > 185*   < > 140*  135*   < >  --    ALBUMIN  --  3.0*  --   --  3.2*  --  2.9*   < > 3.4*  --  3.3*   < >  --    PROTTOTAL  --  5.8*  --   --  6.0*  --  5.5*   < > 5.7*  --  5.5*   < >  --    BILITOTAL  --  0.6  --   --  0.5  --  0.4   < > 0.7  --  0.6   < >  --    ALKPHOS  --  107  --   --  79  --  56   < > 79  --  83   < >  --    ALT  --  6  --   --  10  --  9   < > 15  --  15   < >  --    AST  --  23  --   --  24  --  26   < > 48*  --  44   < >  --     < > = values in this interval not displayed.      GLUCOSE:   Recent Labs   Lab 09/15/24  1231 09/15/24  0618 09/14/24  2108 09/14/24  1609 09/14/24  0838 09/14/24  0441   * 128* 150* 131* 118* 122*

## 2024-09-15 NOTE — PLAN OF CARE
"Neuro: A&Ox4. Uses call light appropriately.   Cardiac: SR/ Sinus tach. Denies chest pain. Sternal incision CDI. CT to -20mmHg.   /73 (BP Location: Right arm)   Pulse 92   Temp 97.8  F (36.6  C) (Oral)   Resp 18   Ht 1.88 m (6' 2\")   Wt 107.4 kg (236 lb 11.2 oz)   SpO2 96%   BMI 30.39 kg/m     Respiratory: Sating well above 95% on RA. SOB on exertion.   GI/: Continent of B&B, uses urinal. BM X1  Diet/appetite: Tolerating diet. Eating fairly.   Activity:  SBA in room. Walks to the bathroom.   Pain:Denies pain.   Skin: Sternal incision, scattered scabs on LE.   LDA's: Right PIV CDI-SL.     Changes: CT removed today. Dressing re-in forced.     Plan: Continue with POC. Notify primary team with changes.      "

## 2024-09-15 NOTE — PLAN OF CARE
D AVSS and sat's 96% on room air when awake but dropped to 82% with sleep so 2L of oxygen for sleep was started. Heart regular and lung sound mostly clear. 2 pleural chest tubes pulling only 22 ml during the night. Voiced c/o incisional pain which was relieved with Tylenol/oxy and Robaxin. Voiding good amounts of yellow/clear urine. Weight this AM dropped 3.2 kgs from yesterday but previous weight was based on a bed scale.   I Vital's, assessment and med's per order.   A Resting in bed with call light in reach.  P Continue to monitor and update MD with changes.

## 2024-09-15 NOTE — PROGRESS NOTES
Pt arrived onto floor via bed at 2245. Pt alert and oriented. Pt ambulated to bathroom with SBA. Dual skin assessment done with rj RODRIGUEZ. No pressure ulcers or concerns found.. Pt settled into bed. CT's placed on 20 cm h20. Report given to cadence RODRIGUEZ

## 2024-09-15 NOTE — PLAN OF CARE
Pt moving well, has met all inpatient PT goals and is safe with ambulation and stairs.  OT to continue to follow for CR.    Physical Therapy Discharge Summary    Reason for therapy discharge:    All goals and outcomes met, no further needs identified.    Progress towards therapy goal(s). See goals on Care Plan in [facility] electronic health record for goal details.  Goals met    Therapy recommendation(s):    Continued therapy is recommended.  Rationale/Recommendations:  Would benefit from OP CR to progress endurance and strength s/p surgery.

## 2024-09-16 ENCOUNTER — APPOINTMENT (OUTPATIENT)
Dept: GENERAL RADIOLOGY | Facility: CLINIC | Age: 54
DRG: 235 | End: 2024-09-16
Attending: NURSE PRACTITIONER
Payer: COMMERCIAL

## 2024-09-16 VITALS
WEIGHT: 231.2 LBS | OXYGEN SATURATION: 100 % | TEMPERATURE: 97.8 F | HEIGHT: 74 IN | SYSTOLIC BLOOD PRESSURE: 87 MMHG | HEART RATE: 105 BPM | RESPIRATION RATE: 16 BRPM | DIASTOLIC BLOOD PRESSURE: 61 MMHG | BODY MASS INDEX: 29.67 KG/M2

## 2024-09-16 LAB
ANION GAP SERPL CALCULATED.3IONS-SCNC: 10 MMOL/L (ref 7–15)
BUN SERPL-MCNC: 18.7 MG/DL (ref 6–20)
CA-I BLD-MCNC: 4.2 MG/DL (ref 4.4–5.2)
CALCIUM SERPL-MCNC: 8.3 MG/DL (ref 8.8–10.4)
CHLORIDE SERPL-SCNC: 102 MMOL/L (ref 98–107)
CREAT SERPL-MCNC: 1.31 MG/DL (ref 0.67–1.17)
EGFRCR SERPLBLD CKD-EPI 2021: 64 ML/MIN/1.73M2
ERYTHROCYTE [DISTWIDTH] IN BLOOD BY AUTOMATED COUNT: 13.7 % (ref 10–15)
GLUCOSE BLDC GLUCOMTR-MCNC: 120 MG/DL (ref 70–99)
GLUCOSE BLDC GLUCOMTR-MCNC: 138 MG/DL (ref 70–99)
GLUCOSE SERPL-MCNC: 146 MG/DL (ref 70–99)
HCO3 SERPL-SCNC: 28 MMOL/L (ref 22–29)
HCT VFR BLD AUTO: 27.6 % (ref 40–53)
HGB BLD-MCNC: 9.2 G/DL (ref 13.3–17.7)
MAGNESIUM SERPL-MCNC: 2 MG/DL (ref 1.7–2.3)
MCH RBC QN AUTO: 31.5 PG (ref 26.5–33)
MCHC RBC AUTO-ENTMCNC: 33.3 G/DL (ref 31.5–36.5)
MCV RBC AUTO: 95 FL (ref 78–100)
PHOSPHATE SERPL-MCNC: 4.5 MG/DL (ref 2.5–4.5)
PLATELET # BLD AUTO: 154 10E3/UL (ref 150–450)
POTASSIUM SERPL-SCNC: 3.5 MMOL/L (ref 3.4–5.3)
RBC # BLD AUTO: 2.92 10E6/UL (ref 4.4–5.9)
SODIUM SERPL-SCNC: 140 MMOL/L (ref 135–145)
WBC # BLD AUTO: 4.4 10E3/UL (ref 4–11)

## 2024-09-16 PROCEDURE — 80048 BASIC METABOLIC PNL TOTAL CA: CPT | Performed by: NURSE PRACTITIONER

## 2024-09-16 PROCEDURE — 83735 ASSAY OF MAGNESIUM: CPT | Performed by: STUDENT IN AN ORGANIZED HEALTH CARE EDUCATION/TRAINING PROGRAM

## 2024-09-16 PROCEDURE — 250N000013 HC RX MED GY IP 250 OP 250 PS 637: Performed by: THORACIC SURGERY (CARDIOTHORACIC VASCULAR SURGERY)

## 2024-09-16 PROCEDURE — 250N000013 HC RX MED GY IP 250 OP 250 PS 637

## 2024-09-16 PROCEDURE — 36415 COLL VENOUS BLD VENIPUNCTURE: CPT | Performed by: THORACIC SURGERY (CARDIOTHORACIC VASCULAR SURGERY)

## 2024-09-16 PROCEDURE — 84100 ASSAY OF PHOSPHORUS: CPT | Performed by: STUDENT IN AN ORGANIZED HEALTH CARE EDUCATION/TRAINING PROGRAM

## 2024-09-16 PROCEDURE — 71046 X-RAY EXAM CHEST 2 VIEWS: CPT | Mod: 26 | Performed by: RADIOLOGY

## 2024-09-16 PROCEDURE — 85014 HEMATOCRIT: CPT | Performed by: STUDENT IN AN ORGANIZED HEALTH CARE EDUCATION/TRAINING PROGRAM

## 2024-09-16 PROCEDURE — 71046 X-RAY EXAM CHEST 2 VIEWS: CPT

## 2024-09-16 PROCEDURE — 82330 ASSAY OF CALCIUM: CPT | Performed by: THORACIC SURGERY (CARDIOTHORACIC VASCULAR SURGERY)

## 2024-09-16 PROCEDURE — 250N000011 HC RX IP 250 OP 636: Performed by: THORACIC SURGERY (CARDIOTHORACIC VASCULAR SURGERY)

## 2024-09-16 PROCEDURE — 250N000013 HC RX MED GY IP 250 OP 250 PS 637: Performed by: NURSE PRACTITIONER

## 2024-09-16 RX ORDER — METHOCARBAMOL 750 MG/1
750 TABLET, FILM COATED ORAL EVERY 6 HOURS PRN
Qty: 90 TABLET | Refills: 0 | Status: SHIPPED | OUTPATIENT
Start: 2024-09-16

## 2024-09-16 RX ORDER — ACETAMINOPHEN 500 MG
650 TABLET ORAL EVERY 6 HOURS PRN
COMMUNITY
Start: 2024-09-16

## 2024-09-16 RX ORDER — MAGNESIUM OXIDE 400 MG/1
400 TABLET ORAL EVERY 4 HOURS
Status: DISCONTINUED | OUTPATIENT
Start: 2024-09-16 | End: 2024-09-16 | Stop reason: HOSPADM

## 2024-09-16 RX ORDER — AMOXICILLIN 250 MG
2 CAPSULE ORAL 2 TIMES DAILY PRN
Qty: 30 TABLET | Refills: 0 | Status: SHIPPED | OUTPATIENT
Start: 2024-09-16

## 2024-09-16 RX ORDER — POTASSIUM CHLORIDE 750 MG/1
20 TABLET, EXTENDED RELEASE ORAL ONCE
Status: COMPLETED | OUTPATIENT
Start: 2024-09-16 | End: 2024-09-16

## 2024-09-16 RX ORDER — OXYCODONE HYDROCHLORIDE 5 MG/1
5 TABLET ORAL EVERY 6 HOURS PRN
Qty: 15 TABLET | Refills: 0 | Status: SHIPPED | OUTPATIENT
Start: 2024-09-16

## 2024-09-16 RX ORDER — CARVEDILOL 3.12 MG/1
3.12 TABLET ORAL 2 TIMES DAILY
Qty: 60 TABLET | Refills: 1 | Status: SHIPPED | OUTPATIENT
Start: 2024-09-16 | End: 2024-09-30

## 2024-09-16 RX ORDER — POLYETHYLENE GLYCOL 3350 17 G/17G
17 POWDER, FOR SOLUTION ORAL DAILY PRN
Qty: 510 G | Refills: 0 | Status: SHIPPED | OUTPATIENT
Start: 2024-09-16

## 2024-09-16 RX ORDER — ASPIRIN 81 MG/1
162 TABLET, CHEWABLE ORAL DAILY
Qty: 180 TABLET | Refills: 0 | Status: SHIPPED | OUTPATIENT
Start: 2024-09-16 | End: 2024-12-15

## 2024-09-16 RX ADMIN — HYDROXYZINE HYDROCHLORIDE 25 MG: 25 TABLET ORAL at 03:54

## 2024-09-16 RX ADMIN — SENNOSIDES AND DOCUSATE SODIUM 1 TABLET: 8.6; 5 TABLET ORAL at 08:56

## 2024-09-16 RX ADMIN — SACUBITRIL AND VALSARTAN 1 TABLET: 24; 26 TABLET, FILM COATED ORAL at 09:00

## 2024-09-16 RX ADMIN — BUMETANIDE 2 MG: 2 TABLET ORAL at 08:56

## 2024-09-16 RX ADMIN — HEPARIN SODIUM 5000 UNITS: 5000 INJECTION, SOLUTION INTRAVENOUS; SUBCUTANEOUS at 03:48

## 2024-09-16 RX ADMIN — OXYCODONE HYDROCHLORIDE 5 MG: 5 TABLET ORAL at 03:53

## 2024-09-16 RX ADMIN — FLUTICASONE FUROATE AND VILANTEROL TRIFENATATE 1 PUFF: 200; 25 POWDER RESPIRATORY (INHALATION) at 09:00

## 2024-09-16 RX ADMIN — POTASSIUM CHLORIDE 20 MEQ: 750 TABLET, EXTENDED RELEASE ORAL at 08:56

## 2024-09-16 RX ADMIN — PANTOPRAZOLE SODIUM 40 MG: 40 TABLET, DELAYED RELEASE ORAL at 08:57

## 2024-09-16 RX ADMIN — EMPAGLIFLOZIN 10 MG: 10 TABLET, FILM COATED ORAL at 08:56

## 2024-09-16 RX ADMIN — CARVEDILOL 3.12 MG: 3.12 TABLET, FILM COATED ORAL at 08:56

## 2024-09-16 RX ADMIN — MAGNESIUM OXIDE TAB 400 MG (241.3 MG ELEMENTAL MG) 400 MG: 400 (241.3 MG) TAB at 08:57

## 2024-09-16 RX ADMIN — ASPIRIN 81 MG CHEWABLE TABLET 162 MG: 81 TABLET CHEWABLE at 08:55

## 2024-09-16 RX ADMIN — ACETAMINOPHEN 650 MG: 325 TABLET ORAL at 03:53

## 2024-09-16 ASSESSMENT — ACTIVITIES OF DAILY LIVING (ADL)
ADLS_ACUITY_SCORE: 30

## 2024-09-16 NOTE — DISCHARGE SUMMARY
[facility]   Cardiothoracic Surgery Hospital Discharge Summary     Scott Donato MRN# [id number]   Age: 54 year old YOB: 1970     Admitting Physician:  Evan Giles MD  Discharge Physician:  Shy Macias NP  Primary Care Physician:        Fran Irwin     DATE OF ADMISSION: 9/10/2024      DATE OF DISCHARGE: September 16, 2024     Admit Wt: 238 lbs  Discharge Wt: 231 lbs          Primary Diagnoses:   Severe multivessel CAD s/p CABG x4   HFrEF (LVEF 35-40%)          Secondary Diagnoses:   Acute postoperative pain, improving  Stress induced hyperglycemia, resolved  Stress induced leukocytosis, resolved  Acute blood loss anemia, stable  Acute blood loss thrombocytopenia, resolved  Hypervolemia, resolved    PROCEDURES PERFORMED:   Date: 9/10/24.  Surgeon: Dr. Evan Giles  1.  Coronary artery bypass grafting x 4               - reversed saphenous vein graft to the right posterior descending coronary artery              - reversed saphenous vein graft to the obtuse marginal branch of the left circumflex coronary artery              - reversed saphenous vein graft to the diagonal branch of the left anterior descending coronary artery              - pedicled left internal mammary artery to left anterior descending coronary artery  2.  Endoscopic vein harvest of the greater saphenous vein from the left lower extremity.  3. Transesophageal echocardiogram  4. Right femoral intra-aortic balloon pump with ultrasound guidance and fluoroscopy       INTRAOPERATIVE FINDINGS:    1) The left internal mammary artery was 3 mm in diameter and had excellent flow.    2) The greater saphenous vein from the left lower extremity was 4-5 mm in diameter and suitable for conduit.    3) The ascending aorta was free of calcified plaque.    4) The right posterior descending coronary artery was 1.5 mm in diameter and free of disease at the site of anastomosis.   5) The  obtuse marginal branch of the left circumflex coronary artery was 1.25 mm in diameter and free of disease at the site of anastomosis.   6) The diagonal branch of the left anterior descending coronary artery was 1.5 mm in  diameter and free of disease at the site anastomosis.    7) The left anterior descending coronary artery 2 mm in diameter and free of disease at the site of anastomosis.    8) After reperfusion and defibrillation, sinus rhythm resumed.    9) Left ventricular function was 20% preoperatively and improved to 30% after bypass on low-dose inotropic support.    PATHOLOGY RESULTS:    none     CULTURE RESULTS:    none    CONSULTS:    PT/OT  Intensivist    BRIEF HISTORY OF ILLNESS:  Scott Donato is a 54 year old male with PMHx asthma, CAD, T2DM c/b charcot foot, CKD, NYHA class III HFrEf s/p 4v CABG (LIMA to LAD, SVG to D1, SVG to PDA, SVG to OM2) on 9/10/2024 by Dr. Giles.     HOSPITAL COURSE: Scott Donato is a 54 year old male who on 9/10/2024 underwent the above-named procedures and tolerated the operation well.     Postoperatively was admitted to the CVICU.  Patient was extubated within protocol on POD #0.  Blood pressure and cardiac index were managed with vasopressors and inotropic agents which were continuously weaned until no longer needed.  Patient was subsequently  transferred to the surgical telemetry floor.    While on the surgical unit, the patient continued to progress well. Chest tubes and temporary pacemaker wires were removed when deemed appropriate.     Patient was fluid overloaded and treated with diuretics. He is below his preoperative weight and will discharge with his PTA diuretic therapy; will re-evaluate need in clinic follow-up. His heart failure regimen has been reintroduced and should be adjusted as tolerated outpatient.     Patient was transiently hyperglycemic and treated with insulin infusion then transitioned to sliding scale insulin per protocol. Blood sugars remained  "stable. No further glycemic control agents needed at this time.     Prior to discharge, his pain was controlled well, he was working well with therapies, able to perform most ADLs, ambulate without difficulty, and had full return of bowel and bladder function.  On September 16, 2024, he was discharged to home in stable condition. Follow up with cardiology and cardiac surgery have been arranged. Pt encouraged to follow up with PCP and cardiac rehab upon discharge.    Patient discharged on aspirin:  Yes 162 mg  Patient discharged on beta blocker: yes    Patient discharged on ACE Inhibitor/ARB: yes - Entresto    Patient discharged on statin: yes          Discharge Disposition:     Discharged to home            Condition on Discharge:     Discharge condition: Stable   Discharge vitals: Blood pressure 90/65, pulse 93, temperature 97.7  F (36.5  C), temperature source Oral, resp. rate 16, height 1.88 m (6' 2\"), weight 104.9 kg (231 lb 3.2 oz), SpO2 96%.   Code status on discharge: Full Code     Vitals:    09/14/24 0400 09/15/24 0215 09/16/24 0405   Weight: 110.6 kg (243 lb 13.3 oz) 107.4 kg (236 lb 11.2 oz) 104.9 kg (231 lb 3.2 oz)       DAY OF DISCHARGE PHYSICAL EXAM:    Gen: A&Ox4, NAD  Neuro: Intact with no focal deficits   CV: RRR, normal S1 S2, no murmurs, rubs or gallops. no JVD  Pulm: CTA, no wheezing or rhonchi, normal breathing on RA  Abd: nondistended, normal BS, soft, nontender  Ext: no peripheral edema, no pitting  Incision: clean, dry, intact, no erythema, sternum stable  Tubes/drain sites: dressing clean and dry      LABS  Most Recent 3 CBC's:  Recent Labs   Lab Test 09/16/24  0619 09/15/24  0618 09/14/24  0441   WBC 4.4 4.1 4.8   HGB 9.2* 8.6* 9.1*   MCV 95 96 94    111* 101*      Most Recent 3 BMP's:  Recent Labs   Lab Test 09/16/24  0737 09/16/24  0619 09/15/24  2201 09/15/24  1231 09/15/24  0618 09/14/24  0838 09/14/24  0441   NA  --  140  --   --  137  --  138   POTASSIUM  --  3.5  --   --  3.6  " --  3.7   CHLORIDE  --  102  --   --  102  --  102   CO2  --  28  --   --  27  --  26   BUN  --  18.7  --   --  19.9  --  24.0*   CR  --  1.31*  --   --  1.11  --  1.22*   ANIONGAP  --  10  --   --  8  --  10   ANITA  --  8.3*  --   --  8.2*  --  8.3*   * 146* 152*   < > 128*   < > 122*    < > = values in this interval not displayed.     Most Recent 2 LFT's:  Recent Labs   Lab Test 09/15/24  0618 24  0441   AST 23 24   ALT 6 10   ALKPHOS 107 79   BILITOTAL 0.6 0.5     Most Recent INR's and Anticoagulation Dosing History:  Anticoagulation Dose History          Latest Ref Rng & Units 2024 9/3/2024 9/10/2024   Recent Dosing and Labs   INR 0.85 - 1.15 0.96  1.00  1.19  1.30  1.53       Details          Multiple values from one day are sorted in reverse-chronological order             Most Recent 3 Troponin's:No lab results found.  Most Recent Cholesterol Panel:No lab results found.  Most Recent 6 Bacteria Isolates From Any Culture (See EPIC Reports for Culture Details):No lab results found.  Most Recent TSH, T4 and A1c Labs:No lab results found.   Recent Labs   Lab 24  0737 24  0619 09/15/24  2201 09/15/24  1639 09/15/24  1231 09/15/24  0618   * 146* 152* 115* 120* 128*       Imagin24 Echocardiogram  Interpretation Summary  Left ventricular function is decreased. The ejection fraction is 35-40%  (moderately reduced).  Mid to apical anteroseptum is akinetic.  The right ventricle is normal size.  Global right ventricular function is mildly reduced.  The inferior vena cava was normal in size with preserved respiratory  variability.  No significant valvular abnormalities present.  Previous study not available for comparison.    24 CXR:  Pending      PRE-ADMISSION MEDICATIONS:  Medications Prior to Admission   Medication Sig Dispense Refill Last Dose    albuterol (PROAIR HFA/PROVENTIL HFA/VENTOLIN HFA) 108 (90 Base) MCG/ACT inhaler Inhale 2 puffs into the lungs every 4 hours  as needed   Past Week at 0800    atorvastatin (LIPITOR) 40 MG tablet Take 40 mg by mouth at bedtime.   Past Week at 2000    budesonide-formoterol (SYMBICORT) 80-4.5 MCG/ACT Inhaler Inhale 2 puffs into the lungs as needed   Past Month at 0800    bumetanide (BUMEX) 1 MG tablet Take 1 mg by mouth 2 times daily   Past Week at 1400    Calcium Carbonate-Vitamin D (OYSTER SHELL CALCIUM/D) 500-5 MG-MCG TABS Take 1 tablet by mouth daily   Past Week at 0800    cetirizine (ZYRTEC) 10 MG tablet Take 10 mg by mouth daily   Past Week at 0800    DOXYCYCLINE HYCLATE PO Take 100 mg by mouth every morning   Past Week at 0800    EPINEPHrine (ANY BX GENERIC EQUIV) 0.3 MG/0.3ML injection 2-pack Inject 0.3 mg into the muscle as needed for anaphylaxis   Unknown    eplerenone (INSPRA) 25 MG tablet Take 25 mg by mouth every morning.   Past Week at 0800    OZEMPIC, 2 MG/DOSE, 8 MG/3ML pen Inject 2 mg subcutaneously every 7 days. Fridays  Last dose: 8/31 8/30/2024 at 0800    [DISCONTINUED] aspirin 81 MG tablet Take 81 mg by mouth every morning   Past Week at 0800    [DISCONTINUED] carvedilol (COREG) 6.25 MG tablet Take 6.25 mg by mouth 2 times daily (with meals)   Past Week at 0800    [DISCONTINUED] empagliflozin (JARDIANCE) 25 MG TABS tablet Take 25 mg by mouth every morning   Past Week at 0800    [DISCONTINUED] sacubitril-valsartan (ENTRESTO) 49-51 MG per tablet Take 1 tablet by mouth 2 times daily   Unknown at 0800       DISCHARGE MEDICATIONS:   Current Discharge Medication List        START taking these medications    Details   acetaminophen (TYLENOL) 500 MG tablet Take 1.5 tablets (750 mg) by mouth every 6 hours as needed for other (For optimal non-opioid multimodal pain management to improve pain control.).    Associated Diagnoses: S/P CABG x 4      aspirin (ASA) 81 MG chewable tablet Take 2 tablets (162 mg) by mouth daily.  Qty: 180 tablet, Refills: 0    Associated Diagnoses: S/P CABG x 4      methocarbamol (ROBAXIN) 750 MG tablet  Take 1 tablet (750 mg) by mouth every 6 hours as needed for muscle spasms or other (pain).  Qty: 90 tablet, Refills: 0    Associated Diagnoses: S/P CABG x 4      oxyCODONE (ROXICODONE) 5 MG tablet Take 1 tablet (5 mg) by mouth every 6 hours as needed for severe pain.  Qty: 15 tablet, Refills: 0    Associated Diagnoses: S/P CABG x 4      polyethylene glycol (MIRALAX) 17 GM/Dose powder Take 17 g by mouth daily as needed for constipation.  Qty: 510 g, Refills: 0    Associated Diagnoses: S/P CABG x 4      sacubitril-valsartan (ENTRESTO) 24-26 MG per tablet Take 1 tablet by mouth 2 times daily.  Qty: 60 tablet, Refills: 0    Associated Diagnoses: S/P CABG x 4      senna-docusate (SENOKOT-S/PERICOLACE) 8.6-50 MG tablet Take 2 tablets by mouth or Feeding Tube 2 times daily as needed for constipation.  Qty: 30 tablet, Refills: 0    Associated Diagnoses: S/P CABG x 4           CONTINUE these medications which have CHANGED    Details   carvedilol (COREG) 3.125 MG tablet Take 1 tablet (3.125 mg) by mouth 2 times daily.  Qty: 60 tablet, Refills: 1    Associated Diagnoses: S/P CABG x 4      empagliflozin (JARDIANCE) 10 MG TABS tablet Take 1 tablet (10 mg) by mouth daily.  Qty: 30 tablet, Refills: 1    Associated Diagnoses: S/P CABG x 4           CONTINUE these medications which have NOT CHANGED    Details   albuterol (PROAIR HFA/PROVENTIL HFA/VENTOLIN HFA) 108 (90 Base) MCG/ACT inhaler Inhale 2 puffs into the lungs every 4 hours as needed      atorvastatin (LIPITOR) 40 MG tablet Take 40 mg by mouth at bedtime.      budesonide-formoterol (SYMBICORT) 80-4.5 MCG/ACT Inhaler Inhale 2 puffs into the lungs as needed      bumetanide (BUMEX) 1 MG tablet Take 1 mg by mouth 2 times daily      Calcium Carbonate-Vitamin D (OYSTER SHELL CALCIUM/D) 500-5 MG-MCG TABS Take 1 tablet by mouth daily      cetirizine (ZYRTEC) 10 MG tablet Take 10 mg by mouth daily      DOXYCYCLINE HYCLATE PO Take 100 mg by mouth every morning    Associated  Diagnoses: Right upper quadrant abdominal tenderness with rebound tenderness      EPINEPHrine (ANY BX GENERIC EQUIV) 0.3 MG/0.3ML injection 2-pack Inject 0.3 mg into the muscle as needed for anaphylaxis      eplerenone (INSPRA) 25 MG tablet Take 25 mg by mouth every morning.      OZEMPIC, 2 MG/DOSE, 8 MG/3ML pen Inject 2 mg subcutaneously every 7 days. Fridays  Last dose: 8/31           STOP taking these medications       aspirin 81 MG tablet Comments:   Reason for Stopping:         sacubitril-valsartan (ENTRESTO) 49-51 MG per tablet Comments:   Reason for Stopping:                CC:Fran Beltran      [facility]   Cardiothoracic Surgery  Office phone: [phone]  Office fax: [phone]

## 2024-09-16 NOTE — PLAN OF CARE
D AVSS with sat's maintaining above 90% on room air overnight. Voiced c/o incisional pain which was relieved with Tylenol/oxycodone/atarax and robaxin. Heart regular sinus rhythm/tach and lung sounds mostly clear. Last 2 chest tubes pulled yesterday. Small amount of leaking from old chest tube site so outer dressing was changed x 1. Voiding good amounts of chirstopher urine and weight is down again this morning with a drop of approx 2.5 kgs. Nettie is hoping to go home soon.   I Vital's, assessment and med's per order.  A Resting in bed with call light in reach.  P Continue to monitor and update MD with changes.

## 2024-09-16 NOTE — PROGRESS NOTES
"CLINICAL NUTRITION SERVICES    Reason for Assessment:  Heart-healthy nutrition education, received consult.    Diet History:  Met with pt. He reports that he's received heart healthy education in the past. Since March he's been following a low Na diet, eating lots of fresh fruits and vegetables, eating salads, watching portions at restaurants and selecting heart healthy dishes. He's done lots of work on his diet to get his A1C down from 13 to 5. He's very motivated to make changes to his diet that will help his health.    Nutrition Diagnosis:  No nutrition dx at this time.    Nutrition Prescription/Recs:  Continue heart-healthy diet.      Interventions:  Nutrition Education  1. Discussed previous knowledge of heart healthy diet  2. Provided handouts: Nutrition Care Manual Heart-Healthy Nutrition Therapy      Goals:    1. Pt will verbalize at least four foods high in saturated fat and five foods high in sodium.    2. Pt will list at least two interventions to make current meal plan more heart-healthy.     Follow-up:   Patient to ask any further nutrition-related questions before discharge. In addition, pt may request outpatient RD appointment.     Jaquan Hobson RD, LD  Available on [employer]  Weekend/Holiday ARYA Rockwell - \"Weekend Clinical Dietitian\"  No longer available by paging   "

## 2024-09-16 NOTE — DISCHARGE INSTRUCTIONS
AFTER YOU GO HOME FROM YOUR HEART SURGERY    From the date of surgery: avoid lifting anything greater than ten pounds for 8 weeks after surgery and then less than 20 pounds for an additional 4 weeks.   Do not reach backwards or use arms to push out of chair.   Do not let people pull on your arms to assist with standing.   Avoid twisting or reaching too far across your body.    Avoid strenuous activities such as bowling, vacuuming, raking, shoveling, golf or tennis for 12 weeks after your surgery.   It is okay to resume sex if you feel comfortable in doing so. You may have to try different positions with your partner.    Splint your chest incision by hugging a pillow or bringing your arms across your chest when coughing or sneezing.     No driving for 4 weeks from the date of surgery or while on pain medication.    Shower or wash your incisions daily with soap and water (or as instructed), pat dry.   Keep wound clean and dry, showers are okay after discharge, but don't let spray hit directly on incision.   No baths or swimming for 1 month.   Cover chest tube sites with dry gauze until they stop draining, then leave open to air. It is not abnormal for chest tube sites to drain yellowish/clear fluid for up to 2-3 weeks after surgery.   Watch for signs of infection: increased redness, tenderness, warmth or any drainage that appears infected (pus like) or is persistent.  Also a temperature > 100.5 F or chills. Call your surgeon or primary care provider's office immediately.   Remove any skin glue left on incisions after 10-14 days. This will not affect your incision and can speed up healing.    Exercise is very important in your recovery. Please follow the guidelines set up for you in your cardiac rehab classes at the hospital. If outpatient cardiac rehab was ordered for you, we highly recommend you participate. If you have problems arranging your cardiac rehab, please call [phone] for all locations, with the  "exception of [place], please call [phone] and [place], please call [phone].    Avoid sitting for prolonged periods of time, try to walk every hour during the day. If you have a leg incision, elevate your leg often when you are not walking.    Check your weight when you get home from the hospital and continue to check it daily through your recovery for at least a month. If you notice a weight gain of 2-3 pounds in a week, notify your primary care physician, cardiologist or surgeon.    Bowel activity may be slow after surgery. If necessary, you may take an over the counter laxative such as Milk of Magnesia or Miralax. You may have stool softeners prescribed (docusate sodium, Senokot). We recommend using stool softeners while using narcotics for pain (oxycodone/percocet, hydrocodone/vicodin, hydromorphone/dilaudid).      Wean OFF of narcotics (oxycodone, dilaudid, hydrocodone) as soon as possible. You should continue taking acetaminophen as long as you have any surgical pain as the first choice for pain control and add narcotics as necessary for pain to be tolerable.      DENTAL VISITS AFTER SURGERY  If you have had your heart valve repaired or replaced, we do not recommend having any dental work done for 6 months and you will need to take an antibiotic prior to dental visits from now on.  Please notify your dentist before any procedure for the proper treatment needed. The antibiotic is taken by mouth one hour prior to visit. This includes routine cleanings.  You can sometimes hear a mechanical valve \"clicking,\" this is normal and not a sign of something wrong.    DO NOT SMOKE.  IF YOU NEED HELP QUITTING, PLEASE TALK WITH YOUR CARDIOLOGIST OR PRIMARY DOCTOR.    You have an LVAD device, so call your LVAD coordinator with all questions and concerns.  No swimming, showering, or baths. Daily sterile driveline dressing changes as instructed. You cannot have a MRI with your LVAD in place. No contact sports. "     REGARDING PRESCRIPTION REFILLS.  If you need a refill on your pain medication contact us to discuss your pain and a possible one time refill.   All other medications will be adjusted, discontinued and re-filled by your primary care physician and/or your cardiologist as they were prior to your surgery. We have given you enough for one to three month with possibly one refill.    POST-OPERATIVE CLINIC VISITS  You have a follow up visit with [facility] Advance Care Practitioners as scheduled.  You will then return to the care of your primary provider and your cardiologist. Future medication refills should come from your primary care provider or Cardiologist.   You should see your primary care provider in 2-4 weeks after discharge.   It is important to see your cardiologist about 4-6 weeks after discharge.    If you do not hear from a  in 7 days, please call [phone] (choose option 1) and request to be seen with a general cardiologist or someone that you have seen in the past.   If there is a need to return to see CT Surgery please call our  at [phone].    SURGICAL QUESTIONS  Please call Nusrat Lance, Negar Millan, Ivon Ron or Margaux Cleaning with surgical recovery and medication questions. They will assist you with your needs and contact other surgery care team members as indicated.    On weekends or after hours, please call [phone] and ask the  to page the Cardiothoracic Surgery fellow on call.      Thank you,    Your Cardiothoracic Surgery Team   Bony Whitney RN Care Coordinator - [phone]  Nusrat Mazariegos RN Care Coordinator - [phone]   Margaux Cleaning, RN Care Coordinator - [phone]   Negar Millan RN Care Coordinator - [phone]  Ivon Ron RN Care Coordinator - [phone]

## 2024-09-16 NOTE — PLAN OF CARE
Occupational Therapy Discharge Summary    Reason for therapy discharge:    Discharged to home with outpatient therapy.    Progress towards therapy goal(s). See goals on Care Plan in [facility] electronic health record for goal details.  Goals partially met.  Barriers to achieving goals:   discharge from facility.    Therapy recommendation(s):    Continued therapy is recommended.  Rationale/Recommendations:  pt will benefit from ongoing rehab in OP CR setting for continued progression of cardiovascular health and endurance.

## 2024-09-17 ENCOUNTER — PATIENT OUTREACH (OUTPATIENT)
Dept: CARE COORDINATION | Facility: CLINIC | Age: 54
End: 2024-09-17
Payer: COMMERCIAL

## 2024-09-17 NOTE — PROGRESS NOTES
[facility]: Transitions of Care Outreach  Chief Complaint   Patient presents with    Clinic Care Coordination - Post Hospital       Most Recent Admission Date: 9/10/2024   Most Recent Admission Diagnosis: Coronary artery disease - I25.10     Most Recent Discharge Date: 9/16/2024   Most Recent Discharge Diagnosis: Coronary artery disease - I25.10  S/P CABG x 4 - Z95.1     Transitions of Care Assessment    Discharge Assessment  How are you doing now that you are home?: I am fine. I am adjusting. Pt said he was a little confused about some of his medications and the chnages on some of them. Pt called and left a voicemail for his cardiologist and would like to go over those changes with them.  How are your symptoms? (Red Flag symptoms escalate to triage hotline per guidelines): Improved  Do you know how to contact your clinic care team if you have future questions or changes to your health status? : Yes  Does the patient have their discharge instructions? : Yes  Does the patient have questions regarding their discharge instructions? : No  Were you started on any new medications or were there changes to any of your previous medications? : Yes  Does the patient have all of their medications?: Yes  Do you have questions regarding any of your medications? : No    Post-op ([facility] CTA Only)  If the patient had a surgery or procedure, do they have any questions for a nurse?: No             Follow up Plan     Discharge Follow-Up  Discharge follow up appointment scheduled in alignment with recommended follow up timeframe or Transitions of Risk Category? (Low = within 30 days; Moderate= within 14 days; High= within 7 days): Yes  Discharge Follow Up Appointment Date: 10/01/24  Discharge Follow Up Appointment Scheduled with?: Specialty Care Provider    Future Appointments   Date Time Provider Department Center   9/26/2024  8:00 AM 1, Ur Cardiac Rehab [facility]   10/1/2024  3:15 PM UC CVTS [facility]    10/16/2024  7:40 AM Irene Larry APRN CNP [facility]       Outpatient Plan as outlined on AVS reviewed with patient.    For any urgent concerns, please contact our 24 hour nurse triage line: [phone] ([phone])       SHERRI Veras  [phone]  [facility]

## 2024-09-18 ENCOUNTER — TELEPHONE (OUTPATIENT)
Dept: CARDIOLOGY | Facility: CLINIC | Age: 54
End: 2024-09-18
Payer: COMMERCIAL

## 2024-09-18 NOTE — TELEPHONE ENCOUNTER
CARDIOTHORACIC SURGERY  Discharge Follow Up Phone Call    POST OP MONITORING  How is your pain on a 0-10 scale, how are you managing your pain? 2-3/10, currently taking robaxin as needed.     ACTIVITY  How is your activity tolerance? Going well, did a little too much yesterday so going to rest more today.   Are you still doing sternal precautions? Yes  Do you hear any clicking when you are moving or taking a deep breath? No  Are you using your incentive spirometer? Patient did not bring one home. Encouraged patient to take long slow deep breaths on his own  Are you weighing yourself daily? No, patient is not having increased swelling.     SIGNS AND SYMPTOMS OF INFECTION  INCREASE IN PAIN - No  FEVER - no  DRAINAGE - no   REDNESS - no  SWELLING - no    ASSISTANCE  Do you have someone at home to assist you with your daily activities? Yes    MEDICATIONS  Is someone helping you to set up your medications? Yes  Do you have any questions about your medications? No    FOLLOW UP  You are scheduled to see your primary care physician on 10/2.  You are scheduled to see our surgery advanced practive provider for post operative follow up on 10/1.    You are scheduled for cardiac rehab on 9/26.  You are scheduled to see your cardiologist on 10/16.    CONTACT INFORMATION  Please feel free to call us with any questions or symptoms that are concerning for you at [phone]. If it is after 4:30 in the afternoon, or a weekend please call [phone] and ask for the on call specialist. We want to do everything we can to help prevent you needing to return to the ED, so please do not hesitate to call us.    Nusrat Mazariegos, RNCC  Cardiothoracic Surgery  [phone]

## 2024-09-26 ENCOUNTER — HOSPITAL ENCOUNTER (OUTPATIENT)
Dept: CARDIAC REHAB | Facility: CLINIC | Age: 54
Discharge: HOME | End: 2024-09-26
Payer: COMMERCIAL

## 2024-09-26 ENCOUNTER — TELEPHONE (OUTPATIENT)
Dept: CARDIOLOGY | Facility: CLINIC | Age: 54
End: 2024-09-26
Payer: COMMERCIAL

## 2024-09-26 DIAGNOSIS — I25.10 CORONARY ARTERY DISEASE INVOLVING NATIVE CORONARY ARTERY OF NATIVE HEART WITHOUT ANGINA PECTORIS: Primary | ICD-10-CM

## 2024-09-26 DIAGNOSIS — Z95.1 S/P CABG (CORONARY ARTERY BYPASS GRAFT): ICD-10-CM

## 2024-09-26 PROCEDURE — 93798 PHYS/QHP OP CAR RHAB W/ECG: CPT

## 2024-09-26 PROCEDURE — 93797 PHYS/QHP OP CAR RHAB WO ECG: CPT | Mod: XU

## 2024-09-26 NOTE — TELEPHONE ENCOUNTER
Date: 9/26/2024    Time of Call: 11:22 AM     Diagnosis:  HF/hypotension       [ TORB ] Ordering provider: Irene Larry CNP   Order:   -HOLD Entresto until seen in CORE clinic next week 9/30.   -CORE Enrollment appt with labs prior made for 9/30/24.      Order received by: Jovita Herrera RN

## 2024-09-26 NOTE — TELEPHONE ENCOUNTER
----- Message from ANGELA LARRY sent at 9/26/2024 10:51 AM CDT -----  Regarding: FW: Cardiac Rehab - New pt.  I have several more favor to ask of you:  - can you please call pt and ask him to hold Entresto until CVTS appt next week?  - can you remind me of the names of CVTS clinic coordinators so I can forward this?    Thank you!  ----- Message -----  From: Rasta Wiseman  Sent: 9/26/2024  10:29 AM CDT  To: Angela Larry, TRACY CNP  Subject: Cardiac Rehab - New pt.                          Nic Bonilla,    Mr. Donato came into our clinic to start cardiac rehab today. He has yet to establish care with you as he is transferring [place] [facility] after his recent CABG on 9/10. He also has a reduced EF so I was wondering your thoughts on getting him enrolled in CORE clinic so he can follow with [facility]. [facility] for his Heart Failure as well.     Also to note, since discharge he has had significant lightheadedness with change of positions. In clinic his BP was 78/50, with minimal/no change with his 6MWT. His is tachycardic HR = 103 @ rest.     Please advise.    Konrad  [facility] Cardiop[facility]

## 2024-09-27 NOTE — PROGRESS NOTES
[facility]   Cardiology Clinic Note      HPI:   Mr. Scott Donato is a pleasant 54 year old male with medical history pertinent for HFrEF 2/2 ICM (EF 35-40%, ernie 10%), CAD s/p CABG x4 (LIMA to LAD, SVG to D1, SVG to PDA, SVG to OM2, 9/10/24), DM2, and asthma. He presents to cardiology clinic to establish care after surgery.    Scott underwent the above named surgery on 9/10/24 with Dr. Giles, post-op course was uneventful and he was discharged on POD6. Weight on day of discharge 231 lbs.  Patient has been attending cardiac rehab, where he was noted to be symptomatically hypotensive and we instructed him to hold his Entresto until clinic follow-up.     Today in clinic, he denies chest pain, palpitations, dizziness, syncope, or lower extremity edema. Since stopping Entresto, he has not had dizzy spells and has more energy. He is able to walk through the grocery store, rather than use a motorized wheelchair. Has been able to do elliptical and weights at cardiac rehab.   Home weights have been stable 229-232 lbs. BP at rehab still soft /50s. He eats 1-2 meals per day (is on Ozempic which has reduced appetite). He estimates that he drinks 1L of fluid/day.     PAST MEDICAL HISTORY:  Past Medical History:   Diagnosis Date    Asthma     CAD (coronary artery disease)     Charcot foot due to diabetes mellitus (H)     Chronic kidney disease     DM2 (diabetes mellitus, type 2) (H)     Dyspnea on exertion     Former smoker     Heart failure with reduced ejection fraction, NYHA class III (H)     Hyperlipidemia LDL goal <100     Orthopnea        FAMILY HISTORY:  Family History   Problem Relation Age of Onset    Anesthesia Reaction Mother         Slow to wake    Coronary Artery Disease Maternal Grandfather     Coronary Artery Disease Paternal Grandmother     Coronary Artery Disease Paternal Grandfather     Lung Cancer Paternal Grandfather     Substance Abuse Son     Alcoholism Maternal Aunt     Arthritis  Maternal Aunt     Alcoholism Maternal Uncle     Clotting Disorder No family hx of     Bleeding Disorder No family hx of        SOCIAL HISTORY:  Social History     Socioeconomic History    Marital status: Single   Tobacco Use    Smoking status: Former     Current packs/day: 0.00     Types: Cigarettes     Quit date: 2018     Years since quittin.7    Smokeless tobacco: Never   Substance and Sexual Activity    Alcohol use: Yes     Comment: 2x a year    Drug use: Not Currently     Social Determinants of Health     Financial Resource Strain: Low Risk  (9/15/2024)    Financial Resource Strain     Within the past 12 months, have you or your family members you live with been unable to get utilities (heat, electricity) when it was really needed?: No   Food Insecurity: Low Risk  (9/15/2024)    Food Insecurity     Within the past 12 months, did you worry that your food would run out before you got money to buy more?: No     Within the past 12 months, did the food you bought just not last and you didn t have money to get more?: No   Transportation Needs: Low Risk  (9/15/2024)    Transportation Needs     Within the past 12 months, has lack of transportation kept you from medical appointments, getting your medicines, non-medical meetings or appointments, work, or from getting things that you need?: No   Physical Activity: Not on File (12/15/2023)    Received from [facility]    Physical Activity     Physical Activity: 0   Stress: Not on File (12/15/2023)    Received from [facility]    Stress     Stress: 0   Social Connections: Not on File (2024)    Received from [facility]    Social Connections     Connectedness: 0   Interpersonal Safety: Low Risk  (9/10/2024)    Interpersonal Safety     Do you feel physically and emotionally safe where you currently live?: Yes     Within the past 12 months, have you been hit, slapped, kicked or otherwise physically hurt by someone?: No     Within the past 12 months, have you been humiliated or  emotionally abused in other ways by your partner or ex-partner?: No   Housing Stability: High Risk (9/15/2024)    Housing Stability     Do you have housing? : No     Are you worried about losing your housing?: No       CURRENT MEDICATIONS:  Current Outpatient Medications   Medication Sig Dispense Refill    acetaminophen (TYLENOL) 500 MG tablet Take 1.5 tablets (750 mg) by mouth every 6 hours as needed for other (For optimal non-opioid multimodal pain management to improve pain control.).      albuterol (PROAIR HFA/PROVENTIL HFA/VENTOLIN HFA) 108 (90 Base) MCG/ACT inhaler Inhale 2 puffs into the lungs every 4 hours as needed      aspirin (ASA) 81 MG chewable tablet Take 2 tablets (162 mg) by mouth daily. 180 tablet 0    atorvastatin (LIPITOR) 40 MG tablet Take 40 mg by mouth at bedtime.      budesonide-formoterol (SYMBICORT) 80-4.5 MCG/ACT Inhaler Inhale 2 puffs into the lungs as needed      bumetanide (BUMEX) 1 MG tablet Take 1 mg by mouth 2 times daily      Calcium Carbonate-Vitamin D (OYSTER SHELL CALCIUM/D) 500-5 MG-MCG TABS Take 1 tablet by mouth daily      carvedilol (COREG) 3.125 MG tablet Take 1 tablet (3.125 mg) by mouth 2 times daily. 60 tablet 1    cetirizine (ZYRTEC) 10 MG tablet Take 10 mg by mouth daily      DOXYCYCLINE HYCLATE PO Take 100 mg by mouth every morning      empagliflozin (JARDIANCE) 10 MG TABS tablet Take 1 tablet (10 mg) by mouth daily. 30 tablet 1    EPINEPHrine (ANY BX GENERIC EQUIV) 0.3 MG/0.3ML injection 2-pack Inject 0.3 mg into the muscle as needed for anaphylaxis      eplerenone (INSPRA) 25 MG tablet Take 25 mg by mouth every morning.      methocarbamol (ROBAXIN) 750 MG tablet Take 1 tablet (750 mg) by mouth every 6 hours as needed for muscle spasms or other (pain). 90 tablet 0    oxyCODONE (ROXICODONE) 5 MG tablet Take 1 tablet (5 mg) by mouth every 6 hours as needed for severe pain. 15 tablet 0    OZEMPIC, 2 MG/DOSE, 8 MG/3ML pen Inject 2 mg subcutaneously every 7 days.  Fridays  Last dose: 8/31      polyethylene glycol (MIRALAX) 17 GM/Dose powder Take 17 g by mouth daily as needed for constipation. 510 g 0    sacubitril-valsartan (ENTRESTO) 24-26 MG per tablet Take 1 tablet by mouth 2 times daily. 60 tablet 0    senna-docusate (SENOKOT-S/PERICOLACE) 8.6-50 MG tablet Take 2 tablets by mouth or Feeding Tube 2 times daily as needed for constipation. 30 tablet 0     No current facility-administered medications for this visit.       ROS:   Refer to HPI    EXAM:  /69 (BP Location: Right arm, Patient Position: Chair, Cuff Size: Adult Regular)   Pulse 97   Wt 104.2 kg (229 lb 12.8 oz)   SpO2 96%   BMI 29.50 kg/m    GENERAL: Appears comfortable, in no acute distress.   HEENT: Eye symmetrical, no discharge or icterus bilaterally. Mucous membranes moist and without lesions.  CV: RRR, +S1S2, no murmur, rub, or gallop. JVD below midclavicular line at 45 degrees   RESPIRATORY: Respirations regular, even, and unlabored. Lungs CTA throughout.   GI: Soft and non distended with normoactive bowel sounds present in all quadrants. No tenderness, rebound, guarding.   EXTREMITIES: no peripheral edema. 2+ bilateral pedal pulses.   NEUROLOGIC: Alert and oriented x 3. No focal deficits.   MUSCULOSKELETAL: No joint swelling or tenderness.   SKIN: No jaundice. No rashes or lesions.     Labs, reviewed with patient in clinic today:  CBC RESULTS:  Lab Results   Component Value Date    WBC 4.4 09/16/2024    RBC 2.92 (L) 09/16/2024    HGB 9.2 (L) 09/16/2024    HCT 27.6 (L) 09/16/2024    MCV 95 09/16/2024    MCH 31.5 09/16/2024    MCHC 33.3 09/16/2024    RDW 13.7 09/16/2024     09/16/2024       CMP RESULTS:  Lab Results   Component Value Date     09/30/2024    POTASSIUM 3.9 09/30/2024    POTASSIUM 4.9 09/10/2024    CHLORIDE 101 09/30/2024    CO2 27 09/30/2024    ANIONGAP 13 09/30/2024     (H) 09/30/2024     (H) 09/16/2024    BUN 28.0 (H) 09/30/2024    CR 1.54 (H) 09/30/2024     "GFRESTIMATED 53 (L) 2024    ANITA 9.3 2024    BILITOTAL 0.6 09/15/2024    ALBUMIN 3.0 (L) 09/15/2024    ALKPHOS 107 09/15/2024    ALT 6 09/15/2024    AST 23 09/15/2024        INR RESULTS:  Lab Results   Component Value Date    INR 1.19 (H) 09/10/2024       Lab Results   Component Value Date    MAG 2.0 2024     No results found for: \"NTBNPI\"  No results found for: \"NTBNP\"    LIPIDS:  No results for input(s): \"CHOL\", \"HDL\", \"LDL\", \"TRIG\", \"CHOLHDLRATIO\" in the last 45748 hours.    Echocardiogram:  Recent Results (from the past 4320 hour(s))   Echo Complete   Result Value    LVEF  35-40% (moderately reduced)    [facility]    [id number]  [id number]  [id number]  [id number]^YARITZA^FREDDIE     [facility],[place]  Echocardiography Laboratory  [address] [place] [address]     Name: YAEL MEAD  MRN: [id number]  : 1970  Study Date: 2024 09:39 AM  Age: 54 yrs  Gender: Male  Patient Location: [facility]  Reason For Study: Heart Failure  Ordering Physician: FREDDIE VIGIL  Performed By: Janet Severino RDCS     BSA: 2.4 m2  Height: 74 in  Weight: 243 lb  BP: 114/79 mmHg  ______________________________________________________________________________  Procedure  Echocardiogram with two-dimensional, color and spectral Doppler performed.  Contrast Optison. Optison (NDC #3562-4933-50) given intravenously. Patient was  given 5 ml mixture of 3 ml Optison and 6 ml saline. 4 ml wasted.  ______________________________________________________________________________  Interpretation Summary  Left ventricular function is decreased. The ejection fraction is 35-40%  (moderately reduced).  Mid to apical anteroseptum is akinetic.  The right ventricle is normal size.  Global right ventricular function is mildly reduced.  The inferior vena cava was normal in size with preserved respiratory  variability.  No significant valvular abnormalities present.  Previous study not available for " comparison.  ______________________________________________________________________________  Left Ventricle  Left ventricular wall thickness cannot evaluate. Mild left ventricular  dilation is present. Left ventricular function is decreased. The ejection  fraction is 35-40% (moderately reduced). Left ventricular diastolic function  is not assessable. Mild to moderate diffuse hypokinesis is present. Mid to  apical anteroseptum is akinetic. Inferolateral (posterior) wall hypokinesis is  present.     Right Ventricle  The right ventricle is normal size. Global right ventricular function is  mildly reduced. A right heart catheter is noted in the right ventricle.     Atria  The atria cannot be assessed.     Mitral Valve  The mitral valve is normal.     Aortic Valve  The valve leaflets are not well visualized. On Doppler interrogation, there is  no significant stenosis or regurgitation.     Tricuspid Valve  The tricuspid valve is normal.     Pulmonic Valve  The pulmonic valve is normal.     Vessels  The inferior vena cava was normal in size with preserved respiratory  variability. The aorta root is normal.     Pericardium  No pericardial effusion is present.     Miscellaneous  No significant valvular abnormalities present.     Compared to Previous Study  Previous study not available for comparison.  ______________________________________________________________________________  MMode/2D Measurements & Calculations  IVSd: 1.1 cm  LVIDd: 5.7 cm  LVIDs: 4.8 cm  LVPWd: 0.97 cm  FS: 16.4 %  LV mass(C)d: 230.8 grams  LV mass(C)dI: 97.8 grams/m2  LVOT diam: 2.3 cm  LVOT area: 4.3 cm2  RWT: 0.34     ______________________________________________________________________________  Report approved by: Crystal WORTHINGTON 09/14/2024 11:04 AM             Assessment and Plan:   Mr. Donato is a 54 year old male with a PMH of HFrEF 2/2 ICM (EF 35-40%), CAD s/p CABG x4 (LIMA to LAD, SVG to D1, SVG to PDA, SVG to OM2, 9/10/24), DM2, and  asthma.    # Chronic systolic heart failure 2/2 ICM (LVEF 35-40%, ernie 10%)  Stage C NYHA Class II  Fluid status hypovolemic -- switch Bumex from BID to PRN for swelling or SOB  ACEi/ARB  entresto stopped d/t symptomatic hypotension. Consider re-trialing low-dose ACE/ARB in the future  BB: increase carvedilol to 6.25mg BID  SGLT2i: Jardiance 10mg daily   Aldosterone antagonist: eplerenone 25mg daily  SCD prophylaxis decision deferred during medication uptitration  % BiV pacing: N/A  NSAIDS: contraindicated, discussed with patient  - BMP in 2 weeks     # Coronary artery disease s/p CABG x4 (LIMA to LAD, SVG to D1, SVG to PDA, SVG to OM2, 9/10/24)  - aspirin 132mg daily  - continue atorvastatin 40mg daily    # SWATI  Baseline creatinine 1.0-1.2, today 1.5  - stop bumex BID, keep as PRN for swelling, weight gain or SOB    Plan  - switch Bumex from BID to PRN for swelling or SOB  - increase carvedilol to 6.25mg BID  - BMP in 2 weeks    Follow up:  CORE in 3 months; establish with HF MD  Chart review time today: 10 minutes  Visit time today: 25 minutes  Total time spent today: 35 minutes        Irene Larry CNP  [facility] Clinic   09/30/24

## 2024-09-30 ENCOUNTER — OFFICE VISIT (OUTPATIENT)
Dept: CARDIOLOGY | Facility: CLINIC | Age: 54
End: 2024-09-30
Attending: CASE MANAGER/CARE COORDINATOR
Payer: COMMERCIAL

## 2024-09-30 ENCOUNTER — LAB (OUTPATIENT)
Dept: LAB | Facility: CLINIC | Age: 54
End: 2024-09-30
Payer: COMMERCIAL

## 2024-09-30 ENCOUNTER — HOSPITAL ENCOUNTER (OUTPATIENT)
Dept: CARDIAC REHAB | Facility: CLINIC | Age: 54
Discharge: HOME | End: 2024-09-30
Attending: THORACIC SURGERY (CARDIOTHORACIC VASCULAR SURGERY)
Payer: COMMERCIAL

## 2024-09-30 VITALS
SYSTOLIC BLOOD PRESSURE: 101 MMHG | BODY MASS INDEX: 29.5 KG/M2 | WEIGHT: 229.8 LBS | OXYGEN SATURATION: 96 % | DIASTOLIC BLOOD PRESSURE: 69 MMHG | HEART RATE: 97 BPM

## 2024-09-30 DIAGNOSIS — I25.10 CORONARY ARTERY DISEASE INVOLVING NATIVE CORONARY ARTERY OF NATIVE HEART WITHOUT ANGINA PECTORIS: ICD-10-CM

## 2024-09-30 DIAGNOSIS — I50.22 CHRONIC SYSTOLIC HEART FAILURE (H): Primary | ICD-10-CM

## 2024-09-30 DIAGNOSIS — Z95.1 S/P CABG X 4: ICD-10-CM

## 2024-09-30 DIAGNOSIS — N17.9 AKI (ACUTE KIDNEY INJURY) (H): ICD-10-CM

## 2024-09-30 LAB
ANION GAP SERPL CALCULATED.3IONS-SCNC: 13 MMOL/L (ref 7–15)
BUN SERPL-MCNC: 28 MG/DL (ref 6–20)
CALCIUM SERPL-MCNC: 9.3 MG/DL (ref 8.8–10.4)
CHLORIDE SERPL-SCNC: 101 MMOL/L (ref 98–107)
CREAT SERPL-MCNC: 1.54 MG/DL (ref 0.67–1.17)
EGFRCR SERPLBLD CKD-EPI 2021: 53 ML/MIN/1.73M2
GLUCOSE SERPL-MCNC: 121 MG/DL (ref 70–99)
HCO3 SERPL-SCNC: 27 MMOL/L (ref 22–29)
NT-PROBNP SERPL-MCNC: 1748 PG/ML (ref 0–900)
POTASSIUM SERPL-SCNC: 3.9 MMOL/L (ref 3.4–5.3)
SODIUM SERPL-SCNC: 141 MMOL/L (ref 135–145)

## 2024-09-30 PROCEDURE — 80048 BASIC METABOLIC PNL TOTAL CA: CPT | Performed by: PATHOLOGY

## 2024-09-30 PROCEDURE — 99213 OFFICE O/P EST LOW 20 MIN: CPT | Performed by: CASE MANAGER/CARE COORDINATOR

## 2024-09-30 PROCEDURE — 99214 OFFICE O/P EST MOD 30 MIN: CPT | Mod: 24 | Performed by: CASE MANAGER/CARE COORDINATOR

## 2024-09-30 PROCEDURE — 99204 OFFICE O/P NEW MOD 45 MIN: CPT | Mod: 24 | Performed by: CASE MANAGER/CARE COORDINATOR

## 2024-09-30 PROCEDURE — 36415 COLL VENOUS BLD VENIPUNCTURE: CPT | Performed by: PATHOLOGY

## 2024-09-30 PROCEDURE — 93798 PHYS/QHP OP CAR RHAB W/ECG: CPT

## 2024-09-30 PROCEDURE — 83880 ASSAY OF NATRIURETIC PEPTIDE: CPT | Performed by: PATHOLOGY

## 2024-09-30 RX ORDER — CARVEDILOL 3.12 MG/1
6.25 TABLET ORAL 2 TIMES DAILY
COMMUNITY
Start: 2024-09-30

## 2024-09-30 ASSESSMENT — PAIN SCALES - GENERAL: PAINLEVEL: NO PAIN (0)

## 2024-09-30 NOTE — PATIENT INSTRUCTIONS
Take your medicines every day, as directed    Changes made today:  Stop taking Bumex -- keep it for as needed (swelling, shortness of breath)  Increase carvedilol to 6.25mg twice daily   Labs in 2 weeks   Drink more water   Monitor Your Weight and Symptoms    Contact us if you:    Gain 2 pounds in one day or 5 pounds in one week  Feel more short of breath  Notice more leg swelling  Feel lightheadeded   Change your lifestyle    Limit Salt or Sodium:  2000 mg  Limit Fluids:  2000 mL or approximately 64 ounces  Eat a Heart Healthy Diet  Low in saturated fats  Stay Active:  Aim to move at least 150 minutes every  week         To Contact us    During Business Hours:  [phone], option # 1      After hours, weekends or holidays:   [phone], Option #4  Ask to speak to the On-Call Cardiologist. Inform them you are a CORE/heart failure patient at the [facility].     Use [employer] allows you to communicate directly with your heart team through secure messaging.  [employer] can be accessed any time on your phone, computer, or tablet.  If you need assistance, we'd be happy to help!         Keep your Heart Appointments:    - Please make an appointment with a HF MD   - CORE in 3 months        
soft/nondistended/nontender

## 2024-09-30 NOTE — PROGRESS NOTES
CARDIOTHORACIC SURGERY FOLLOW-UP VISIT     Scott Donato   1970   [id number]      Date: 9/10/24.  Surgeon: Dr. Evan Giles  1.  Coronary artery bypass grafting x 4       - vein graft to the right posterior descending coronary artery      - vein graft to the obtuse marginal branch of the left circumflex coronary artery      - vein graft to the diagonal branch of the left anterior descending coronary artery      - pedicled left internal mammary artery to left anterior descending coronary artery  2. Endoscopic vein harvest of the greater saphenous vein from the left lower extremity.  3. Transesophageal echocardiogram  4. Right femoral intra-aortic balloon pump with ultrasound guidance and fluoroscopy    ASSESSMENT/PLAN:   Scott Donato is a 54 year old year old male status post 4 vessel coronary artery bypass who returns to clinic for postop visit.     Surgically doing well. Pain is overall controlled. Midline sternal incision healing well without signs of infection. Increasing activity and strength.  Patient is in a regular rhythm per auscultation with HR <100 bpm.    Appears euvolemic on examinination with no appreciable JVD, lower extremity edema, or abdominal bloating.  Discharge weight 231 lbs; weight today 228 lbs.     Hemodynamics are stable. He has soft blood pressures but is no longer symptomatic since stopping Entresto.   [place] [facility] [place] future medication refills and general medical care as scheduled tomorrow.   You have already been seen in Follow up with your cardiology team for Chronic systolic heart failure (Irene Larry NP, 9/30/24). Cardiology had previously held Entresto for orthostatic hypoTN (resolved) and now switched bumex to 'as needed' for leg swelling or shortness of breath.  Coreg increased to 6.25 mg BID. Return to Cardiology [facility] clinic as directed in about 3 months.   Sleep schedule: try to get on to a consistent schedule and bedtime.    Continue Outpatient Cardiac Rehab until completed.   Continue sternal precautions for 12 weeks from surgery date.   Return to work: protocol is 3 months off, but can return as tolerated after 8 weeks. Must be able to perform job duties like consistent lifting, reaching, bending, and efficient performance without mistakes. Flexible hours or limited duty may be better tolerated while you build back your endurance.   Per recommendations, no driving for 4 weeks from surgery date. He has been driving and it is going well so far, able to drive safely without pain and discomfort.     Postoperative restrictions have been reviewed and all of the patient's questions were answered. Our contact information was given to the patient if he should have any further questions/concerns. No further follow up is needed with us.  The total time spent with the patient was 35 minutes, > 50% of which was spent in counseling and coordination of care.    Anthony Huitron PA-C  Cardiothoracic Surgery  __________________________________________________________________________________  HPI:   Scott Donato is a 54 year old year old male with a PMH of CAD, ICM, HFrEF, pulmonary edema on diuretics, DMT2, Asthma, EtOH abuse, Charcot foot, HLD, smoking, and orthopnea. [facility] with chest pain and exertional shortness of breath. Workup included TTE, which showed EF of 10%. Coronary angiogram confirmed severe 3V CAD, his cardiac MRI showed viable myocardium. He was referred to CVTS and deemed an appropriate surgical candidate and underwent 4 v CABG 9/10/24 with IABP support.   Hospital course was remarkable for hypervolemia and diuretic therapy.  His LV EF at discharge had increased to 30-45%, he was discharged on heart failure GDMT, including entresto, coreg, jardiance.  Patient was discharged home on 9/16/24.    Patient has been doing well since discharge and now returns to clinic for postop visit.    Patient  endorses:  - fever, chills, chest pain, edema, SOB, and nausea.   - Was hypotensive and symptomatic previously at cardiac rehab, Entresto was held per cardiology, and his symptoms have resolved.   - Cardiology reduced Bumex to PRN instead of BID.   - Feels like he should be doing better overall than he is doing right now. Still having some mental and physical fatigue.   - Sleeping is still kind of sporadic with an inconsistent schedule.     Patient reports incision is healing well.  Denies sternal popping or clicking or incisional drainage/erythema.  Patient denies any fever, chills, chest pain, palpitations, edema, SOB, lightheadedness, and nausea.    Patient is having firm bowel movements and voiding without problems.    Has been attending cardiac rehabilitation and that is going well.    Drinking about 1 liter of water a day.     Patient is not on systemic anticoagulation.   Weight has been stable overall (slightly down from discharge).    Blood glucose levels have been under good control, < 150.     PAST MEDICAL HISTORY:  Past Medical History:   Diagnosis Date    Asthma     CAD (coronary artery disease)     Charcot foot due to diabetes mellitus (H)     Chronic kidney disease     DM2 (diabetes mellitus, type 2) (H)     Dyspnea on exertion     Former smoker     Heart failure with reduced ejection fraction, NYHA class III (H)     Hyperlipidemia LDL goal <100     Orthopnea        PAST SURGICAL HISTORY:  Past Surgical History:   Procedure Laterality Date    Amputation of foot Right 2023    APPENDECTOMY      BYPASS GRAFT ARTERY CORONARY N/A 9/10/2024    Procedure: median sternomy, coronary artery bypass graft X 4, Left internal mammary haverst, Left endoscopic saphenous emory havest, on cardiopulmonary bypass, Transesophageal Echocardiogram, Right femoral balloon pump and right fluroscopy.;  Surgeon: Evan Giles MD;  Location: [facility] OR    COLONOSCOPY      INSERT INTRAAORTIC BALLOON PUMP N/A 9/10/2024     Procedure: Insert intraaortic balloon pump;  Surgeon: Evan Giles MD;  Location: [facility] OR    IRRIGATION AND DEBRIDEMENT Left     foot       CURRENT MEDICATIONS:   Current Outpatient Medications   Medication Sig Dispense Refill    acetaminophen (TYLENOL) 500 MG tablet Take 1.5 tablets (750 mg) by mouth every 6 hours as needed for other (For optimal non-opioid multimodal pain management to improve pain control.).      albuterol (PROAIR HFA/PROVENTIL HFA/VENTOLIN HFA) 108 (90 Base) MCG/ACT inhaler Inhale 2 puffs into the lungs every 4 hours as needed      aspirin (ASA) 81 MG chewable tablet Take 2 tablets (162 mg) by mouth daily. 180 tablet 0    atorvastatin (LIPITOR) 40 MG tablet Take 40 mg by mouth at bedtime.      budesonide-formoterol (SYMBICORT) 80-4.5 MCG/ACT Inhaler Inhale 2 puffs into the lungs as needed      Calcium Carbonate-Vitamin D (OYSTER SHELL CALCIUM/D) 500-5 MG-MCG TABS Take 1 tablet by mouth daily      carvedilol (COREG) 3.125 MG tablet Take 2 tablets (6.25 mg) by mouth 2 times daily.      cetirizine (ZYRTEC) 10 MG tablet Take 10 mg by mouth daily      DOXYCYCLINE HYCLATE PO Take 100 mg by mouth every morning      empagliflozin (JARDIANCE) 10 MG TABS tablet Take 1 tablet (10 mg) by mouth daily. 30 tablet 1    EPINEPHrine (ANY BX GENERIC EQUIV) 0.3 MG/0.3ML injection 2-pack Inject 0.3 mg into the muscle as needed for anaphylaxis      eplerenone (INSPRA) 25 MG tablet Take 25 mg by mouth every morning.      methocarbamol (ROBAXIN) 750 MG tablet Take 1 tablet (750 mg) by mouth every 6 hours as needed for muscle spasms or other (pain). 90 tablet 0    oxyCODONE (ROXICODONE) 5 MG tablet Take 1 tablet (5 mg) by mouth every 6 hours as needed for severe pain. 15 tablet 0    OZEMPIC, 2 MG/DOSE, 8 MG/3ML pen Inject 2 mg subcutaneously every 7 days. Fridays  Last dose: 8/31      polyethylene glycol (MIRALAX) 17 GM/Dose powder Take 17 g by mouth daily as needed for constipation. 510 g 0    senna-docusate  "(SENOKOT-S/PERICOLACE) 8.6-50 MG tablet Take 2 tablets by mouth or Feeding Tube 2 times daily as needed for constipation. 30 tablet 0    bumetanide (BUMEX) 1 MG tablet Take 1 mg by mouth as needed (swelling, SOB). (Patient not taking: Reported on 10/1/2024)       No current facility-administered medications for this visit.       ALLERGIES:      Allergies   Allergen Reactions    Bee Venom Anaphylaxis    Perflutren Lipid Microspheres Other (See Comments)     Severe lower back pain (Definity)    Other Reaction(s): Other (see comments)      Severe lower back pain (Definity)       ROS:  Review of symptoms otherwise negative unless commented about in HPI.     LABS:  Last Basic Metabolic Panel:  Lab Results   Component Value Date     09/16/2024      Lab Results   Component Value Date    POTASSIUM 3.5 09/16/2024    POTASSIUM 4.9 09/10/2024     Lab Results   Component Value Date    CHLORIDE 102 09/16/2024     Lab Results   Component Value Date    ANITA 8.3 09/16/2024     Lab Results   Component Value Date    CO2 28 09/16/2024     Lab Results   Component Value Date    BUN 18.7 09/16/2024     Lab Results   Component Value Date    CR 1.31 09/16/2024     Lab Results   Component Value Date     09/16/2024     09/16/2024       Last CBC:   Lab Results   Component Value Date    WBC 4.4 09/16/2024     Lab Results   Component Value Date    RBC 2.92 09/16/2024     Lab Results   Component Value Date    HGB 9.2 09/16/2024     Lab Results   Component Value Date    HCT 27.6 09/16/2024     No components found for: \"MCT\"  Lab Results   Component Value Date    MCV 95 09/16/2024     Lab Results   Component Value Date    MCH 31.5 09/16/2024     Lab Results   Component Value Date    MCHC 33.3 09/16/2024     Lab Results   Component Value Date    RDW 13.7 09/16/2024     Lab Results   Component Value Date     09/16/2024       IMAGING:  None    PHYSICAL EXAM:   /76 (BP Location: Right arm, Patient Position: Chair, Cuff " Size: Adult Regular)   Pulse 95   Wt 103.8 kg (228 lb 14.4 oz)   SpO2 99%   BMI 29.39 kg/m    General: alert and oriented x 3, pleasant, no acute distress, normal mood and affect  Neuro: no focal deficits   CV: S1 S2, no murmurs, rubs or gallops, regular rate and rhythm, no peripheral edema  Pulm: bilateral breath sounds, clear to auscultation, easy work of breathing.  Incision: incisions clean dry and intact without erythema, swelling or drainage. Has a lot of adhesive on his skin surrounding incisions.     PROCEDURES:  None         CC  Fran Irwin

## 2024-09-30 NOTE — LETTER
9/30/2024      RE: Scott Donato  [address]  [address]  [facility] [address]       Dear Colleague,    Thank you for the opportunity to participate in the care of your patient, Scott Donato, at the [facility] [place] [facility]. Please see a copy of my visit note below.      [facility] Card[facility] - [facility] [place]   Cardiology Clinic Note      HPI:   Mr. Scott Donato is a pleasant 54 year old male with medical history pertinent for HFrEF 2/2 ICM (EF 35-40%, ernie 10%), CAD s/p CABG x4 (LIMA to LAD, SVG to D1, SVG to PDA, SVG to OM2, 9/10/24), DM2, and asthma. He presents to cardiology clinic to establish care after surgery.    Scott underwent the above named surgery on 9/10/24 with Dr. Giles, post-op course was uneventful and he was discharged on POD6. Weight on day of discharge 231 lbs.  Patient has been attending cardiac rehab, where he was noted to be symptomatically hypotensive and we instructed him to hold his Entresto until clinic follow-up.     Today in clinic, he denies chest pain, palpitations, dizziness, syncope, or lower extremity edema. Since stopping Entresto, he has not had dizzy spells and has more energy. He is able to walk through the grocery store, rather than use a motorized wheelchair. Has been able to do elliptical and weights at cardiac rehab.   Home weights have been stable 229-232 lbs. BP at rehab still soft /50s. He eats 1-2 meals per day (is on Ozempic which has reduced appetite). He estimates that he drinks 1L of fluid/day.     PAST MEDICAL HISTORY:  Past Medical History:   Diagnosis Date     Asthma      CAD (coronary artery disease)      Charcot foot due to diabetes mellitus (H)      Chronic kidney disease      DM2 (diabetes mellitus, type 2) (H)      Dyspnea on exertion      Former smoker      Heart failure with reduced ejection fraction, NYHA class III (H)      Hyperlipidemia LDL goal <100      Orthopnea        FAMILY  HISTORY:  Family History   Problem Relation Age of Onset     Anesthesia Reaction Mother         Slow to wake     Coronary Artery Disease Maternal Grandfather      Coronary Artery Disease Paternal Grandmother      Coronary Artery Disease Paternal Grandfather      Lung Cancer Paternal Grandfather      Substance Abuse Son      Alcoholism Maternal Aunt      Arthritis Maternal Aunt      Alcoholism Maternal Uncle      Clotting Disorder No family hx of      Bleeding Disorder No family hx of        SOCIAL HISTORY:  Social History     Socioeconomic History     Marital status: Single   Tobacco Use     Smoking status: Former     Current packs/day: 0.00     Types: Cigarettes     Quit date: 2018     Years since quittin.7     Smokeless tobacco: Never   Substance and Sexual Activity     Alcohol use: Yes     Comment: 2x a year     Drug use: Not Currently     Social Determinants of Health     Financial Resource Strain: Low Risk  (9/15/2024)    Financial Resource Strain      Within the past 12 months, have you or your family members you live with been unable to get utilities (heat, electricity) when it was really needed?: No   Food Insecurity: Low Risk  (9/15/2024)    Food Insecurity      Within the past 12 months, did you worry that your food would run out before you got money to buy more?: No      Within the past 12 months, did the food you bought just not last and you didn t have money to get more?: No   Transportation Needs: Low Risk  (9/15/2024)    Transportation Needs      Within the past 12 months, has lack of transportation kept you from medical appointments, getting your medicines, non-medical meetings or appointments, work, or from getting things that you need?: No   Physical Activity: Not on File (12/15/2023)    Received from [employer]    Physical Activity      Physical Activity: 0   Stress: Not on File (12/15/2023)    Received from [employer]    Stress      Stress: 0   Social Connections: Not on File (2024)    Received  from [facility]    Social Connections      Connectedness: 0   Interpersonal Safety: Low Risk  (9/10/2024)    Interpersonal Safety      Do you feel physically and emotionally safe where you currently live?: Yes      Within the past 12 months, have you been hit, slapped, kicked or otherwise physically hurt by someone?: No      Within the past 12 months, have you been humiliated or emotionally abused in other ways by your partner or ex-partner?: No   Housing Stability: High Risk (9/15/2024)    Housing Stability      Do you have housing? : No      Are you worried about losing your housing?: No       CURRENT MEDICATIONS:  Current Outpatient Medications   Medication Sig Dispense Refill     acetaminophen (TYLENOL) 500 MG tablet Take 1.5 tablets (750 mg) by mouth every 6 hours as needed for other (For optimal non-opioid multimodal pain management to improve pain control.).       albuterol (PROAIR HFA/PROVENTIL HFA/VENTOLIN HFA) 108 (90 Base) MCG/ACT inhaler Inhale 2 puffs into the lungs every 4 hours as needed       aspirin (ASA) 81 MG chewable tablet Take 2 tablets (162 mg) by mouth daily. 180 tablet 0     atorvastatin (LIPITOR) 40 MG tablet Take 40 mg by mouth at bedtime.       budesonide-formoterol (SYMBICORT) 80-4.5 MCG/ACT Inhaler Inhale 2 puffs into the lungs as needed       bumetanide (BUMEX) 1 MG tablet Take 1 mg by mouth 2 times daily       Calcium Carbonate-Vitamin D (OYSTER SHELL CALCIUM/D) 500-5 MG-MCG TABS Take 1 tablet by mouth daily       carvedilol (COREG) 3.125 MG tablet Take 1 tablet (3.125 mg) by mouth 2 times daily. 60 tablet 1     cetirizine (ZYRTEC) 10 MG tablet Take 10 mg by mouth daily       DOXYCYCLINE HYCLATE PO Take 100 mg by mouth every morning       empagliflozin (JARDIANCE) 10 MG TABS tablet Take 1 tablet (10 mg) by mouth daily. 30 tablet 1     EPINEPHrine (ANY BX GENERIC EQUIV) 0.3 MG/0.3ML injection 2-pack Inject 0.3 mg into the muscle as needed for anaphylaxis       eplerenone (INSPRA) 25 MG  tablet Take 25 mg by mouth every morning.       methocarbamol (ROBAXIN) 750 MG tablet Take 1 tablet (750 mg) by mouth every 6 hours as needed for muscle spasms or other (pain). 90 tablet 0     oxyCODONE (ROXICODONE) 5 MG tablet Take 1 tablet (5 mg) by mouth every 6 hours as needed for severe pain. 15 tablet 0     OZEMPIC, 2 MG/DOSE, 8 MG/3ML pen Inject 2 mg subcutaneously every 7 days. Fridays  Last dose: 8/31       polyethylene glycol (MIRALAX) 17 GM/Dose powder Take 17 g by mouth daily as needed for constipation. 510 g 0     sacubitril-valsartan (ENTRESTO) 24-26 MG per tablet Take 1 tablet by mouth 2 times daily. 60 tablet 0     senna-docusate (SENOKOT-S/PERICOLACE) 8.6-50 MG tablet Take 2 tablets by mouth or Feeding Tube 2 times daily as needed for constipation. 30 tablet 0     No current facility-administered medications for this visit.       ROS:   Refer to HPI    EXAM:  /69 (BP Location: Right arm, Patient Position: Chair, Cuff Size: Adult Regular)   Pulse 97   Wt 104.2 kg (229 lb 12.8 oz)   SpO2 96%   BMI 29.50 kg/m    GENERAL: Appears comfortable, in no acute distress.   HEENT: Eye symmetrical, no discharge or icterus bilaterally. Mucous membranes moist and without lesions.  CV: RRR, +S1S2, no murmur, rub, or gallop. JVD below midclavicular line at 45 degrees   RESPIRATORY: Respirations regular, even, and unlabored. Lungs CTA throughout.   GI: Soft and non distended with normoactive bowel sounds present in all quadrants. No tenderness, rebound, guarding.   EXTREMITIES: no peripheral edema. 2+ bilateral pedal pulses.   NEUROLOGIC: Alert and oriented x 3. No focal deficits.   MUSCULOSKELETAL: No joint swelling or tenderness.   SKIN: No jaundice. No rashes or lesions.     Labs, reviewed with patient in clinic today:  CBC RESULTS:  Lab Results   Component Value Date    WBC 4.4 09/16/2024    RBC 2.92 (L) 09/16/2024    HGB 9.2 (L) 09/16/2024    HCT 27.6 (L) 09/16/2024    MCV 95 09/16/2024    MCH 31.5  "2024    MCHC 33.3 2024    RDW 13.7 2024     2024       CMP RESULTS:  Lab Results   Component Value Date     2024    POTASSIUM 3.9 2024    POTASSIUM 4.9 09/10/2024    CHLORIDE 101 2024    CO2 27 2024    ANIONGAP 13 2024     (H) 2024     (H) 2024    BUN 28.0 (H) 2024    CR 1.54 (H) 2024    GFRESTIMATED 53 (L) 2024    ANITA 9.3 2024    BILITOTAL 0.6 09/15/2024    ALBUMIN 3.0 (L) 09/15/2024    ALKPHOS 107 09/15/2024    ALT 6 09/15/2024    AST 23 09/15/2024        INR RESULTS:  Lab Results   Component Value Date    INR 1.19 (H) 09/10/2024       Lab Results   Component Value Date    MAG 2.0 2024     No results found for: \"NTBNPI\"  No results found for: \"NTBNP\"    LIPIDS:  No results for input(s): \"CHOL\", \"HDL\", \"LDL\", \"TRIG\", \"CHOLHDLRATIO\" in the last 29340 hours.    Echocardiogram:  Recent Results (from the past 4320 hour(s))   Echo Complete   Result Value    LVEF  35-40% (moderately reduced)    [facility]    [id number]  [id number]  [id number]  [id number]^YARITZA^FREDDIE     [facility],[place]  Echocardiography Laboratory  [address] [place] [address]     Name: YAEL MEAD  MRN: [id number]  : 1970  Study Date: 2024 09:39 AM  Age: 54 yrs  Gender: Male  Patient Location: [facility]  Reason For Study: Heart Failure  Ordering Physician: FREDDIE VIGIL  Performed By: Janet Severino RDCS     BSA: 2.4 m2  Height: 74 in  Weight: 243 lb  BP: 114/79 mmHg  ______________________________________________________________________________  Procedure  Echocardiogram with two-dimensional, color and spectral Doppler performed.  Contrast Optison. Optison (NDC #6491-6609-87) given intravenously. Patient was  given 5 ml mixture of 3 ml Optison and 6 ml saline. 4 ml wasted.  ______________________________________________________________________________  Interpretation " Summary  Left ventricular function is decreased. The ejection fraction is 35-40%  (moderately reduced).  Mid to apical anteroseptum is akinetic.  The right ventricle is normal size.  Global right ventricular function is mildly reduced.  The inferior vena cava was normal in size with preserved respiratory  variability.  No significant valvular abnormalities present.  Previous study not available for comparison.  ______________________________________________________________________________  Left Ventricle  Left ventricular wall thickness cannot evaluate. Mild left ventricular  dilation is present. Left ventricular function is decreased. The ejection  fraction is 35-40% (moderately reduced). Left ventricular diastolic function  is not assessable. Mild to moderate diffuse hypokinesis is present. Mid to  apical anteroseptum is akinetic. Inferolateral (posterior) wall hypokinesis is  present.     Right Ventricle  The right ventricle is normal size. Global right ventricular function is  mildly reduced. A right heart catheter is noted in the right ventricle.     Atria  The atria cannot be assessed.     Mitral Valve  The mitral valve is normal.     Aortic Valve  The valve leaflets are not well visualized. On Doppler interrogation, there is  no significant stenosis or regurgitation.     Tricuspid Valve  The tricuspid valve is normal.     Pulmonic Valve  The pulmonic valve is normal.     Vessels  The inferior vena cava was normal in size with preserved respiratory  variability. The aorta root is normal.     Pericardium  No pericardial effusion is present.     Miscellaneous  No significant valvular abnormalities present.     Compared to Previous Study  Previous study not available for comparison.  ______________________________________________________________________________  MMode/2D Measurements & Calculations  IVSd: 1.1 cm  LVIDd: 5.7 cm  LVIDs: 4.8 cm  LVPWd: 0.97 cm  FS: 16.4 %  LV mass(C)d: 230.8 grams  LV mass(C)dI:  97.8 grams/m2  LVOT diam: 2.3 cm  LVOT area: 4.3 cm2  RWT: 0.34     ______________________________________________________________________________  Report approved by: Crystal WORTHINGTON 09/14/2024 11:04 AM             Assessment and Plan:   Mr. Donato is a 54 year old male with a PMH of HFrEF 2/2 ICM (EF 35-40%), CAD s/p CABG x4 (LIMA to LAD, SVG to D1, SVG to PDA, SVG to OM2, 9/10/24), DM2, and asthma.    # Chronic systolic heart failure 2/2 ICM (LVEF 35-40%, ernie 10%)  Stage C NYHA Class II  Fluid status hypovolemic -- switch Bumex from BID to PRN for swelling or SOB  ACEi/ARB  entresto stopped d/t symptomatic hypotension. Consider re-trialing low-dose ACE/ARB in the future  BB: increase carvedilol to 6.25mg BID  SGLT2i: Jardiance 10mg daily   Aldosterone antagonist: eplerenone 25mg daily  SCD prophylaxis decision deferred during medication uptitration  % BiV pacing: N/A  NSAIDS: contraindicated, discussed with patient  - BMP in 2 weeks     # Coronary artery disease s/p CABG x4 (LIMA to LAD, SVG to D1, SVG to PDA, SVG to OM2, 9/10/24)  - aspirin 132mg daily  - continue atorvastatin 40mg daily    # SWATI  Baseline creatinine 1.0-1.2, today 1.5  - stop bumex BID, keep as PRN for swelling, weight gain or SOB    Plan  - switch Bumex from BID to PRN for swelling or SOB  - increase carvedilol to 6.25mg BID  - BMP in 2 weeks    Follow up:  [facility] in 3 months; establish with HF MD  Chart review time today: 10 minutes  Visit time today: 25 minutes  Total time spent today: 35 minutes        Irene Larry CNP  [facility]   09/30/24            Please do not hesitate to contact me if you have any questions/concerns.     Sincerely,     TRACY DE LEON CNP

## 2024-09-30 NOTE — NURSING NOTE
Chief Complaint   Patient presents with    New Patient     9/30/2024 visit with Irene Larry APRN CNP for ENROLLMENT CORE       Vitals were taken and medications reconciled.    Yoavny Gill, EMT  2:55 PM

## 2024-10-01 ENCOUNTER — OFFICE VISIT (OUTPATIENT)
Dept: CARDIOLOGY | Facility: CLINIC | Age: 54
End: 2024-10-01
Attending: THORACIC SURGERY (CARDIOTHORACIC VASCULAR SURGERY)
Payer: COMMERCIAL

## 2024-10-01 ENCOUNTER — CARE COORDINATION (OUTPATIENT)
Dept: CARDIOLOGY | Facility: CLINIC | Age: 54
End: 2024-10-01

## 2024-10-01 VITALS
BODY MASS INDEX: 29.39 KG/M2 | WEIGHT: 228.9 LBS | HEART RATE: 95 BPM | SYSTOLIC BLOOD PRESSURE: 105 MMHG | OXYGEN SATURATION: 99 % | DIASTOLIC BLOOD PRESSURE: 76 MMHG

## 2024-10-01 DIAGNOSIS — Z95.1 S/P CABG X 4: Primary | ICD-10-CM

## 2024-10-01 PROCEDURE — 99024 POSTOP FOLLOW-UP VISIT: CPT | Performed by: PHYSICIAN ASSISTANT

## 2024-10-01 PROCEDURE — 99213 OFFICE O/P EST LOW 20 MIN: CPT

## 2024-10-01 ASSESSMENT — PAIN SCALES - GENERAL: PAINLEVEL: NO PAIN (0)

## 2024-10-01 NOTE — LETTER
10/1/2024      RE: Scott Donato  [address]  [address]  [facility] [address]       Dear Colleague,    Thank you for the opportunity to participate in the care of your patient, Scott Donato, at the [facility] HEART [facility]. Please see a copy of my visit note below.    CARDIOTHORACIC SURGERY FOLLOW-UP VISIT     Scott Donato   1970   [id number]      Date: 9/10/24.  Surgeon: Dr. Evan Giles  1.  Coronary artery bypass grafting x 4       - vein graft to the right posterior descending coronary artery      - vein graft to the obtuse marginal branch of the left circumflex coronary artery      - vein graft to the diagonal branch of the left anterior descending coronary artery      - pedicled left internal mammary artery to left anterior descending coronary artery  2. Endoscopic vein harvest of the greater saphenous vein from the left lower extremity.  3. Transesophageal echocardiogram  4. Right femoral intra-aortic balloon pump with ultrasound guidance and fluoroscopy    ASSESSMENT/PLAN:   Scott Donato is a 54 year old year old male status post 4 vessel coronary artery bypass who returns to clinic for postop visit.     Surgically doing well. Pain is overall controlled. Midline sternal incision healing well without signs of infection. Increasing activity and strength.  Patient is in a regular rhythm per auscultation with HR <100 bpm.    Appears euvolemic on examinination with no appreciable JVD, lower extremity edema, or abdominal bloating.  Discharge weight 231 lbs; weight today 228 lbs.     Hemodynamics are stable. He has soft blood pressures but is no longer symptomatic since stopping Entresto.   Follow up with your PCP (Dr Rony Irwin, [facility]) for all future medication refills and general medical care as scheduled tomorrow.   You have already been seen in Follow up with your cardiology team for Chronic systolic heart failure  (Irene Larry NP, 9/30/24). Cardiology had previously held Entresto for orthostatic hypoTN (resolved) and now switched bumex to 'as needed' for leg swelling or shortness of breath.  Coreg increased to 6.25 mg BID. Return to Cardiology CORE clinic as directed in about 3 months.   Sleep schedule: try to get on to a consistent schedule and bedtime.   Continue Outpatient Cardiac Rehab until completed.   Continue sternal precautions for 12 weeks from surgery date.   Return to work: protocol is 3 months off, but can return as tolerated after 8 weeks. Must be able to perform job duties like consistent lifting, reaching, bending, and efficient performance without mistakes. Flexible hours or limited duty may be better tolerated while you build back your endurance.   Per recommendations, no driving for 4 weeks from surgery date. He has been driving and it is going well so far, able to drive safely without pain and discomfort.     Postoperative restrictions have been reviewed and all of the patient's questions were answered. Our contact information was given to the patient if he should have any further questions/concerns. No further follow up is needed with us.  The total time spent with the patient was 35 minutes, > 50% of which was spent in counseling and coordination of care.    Anthony Huitron PA-C  Cardiothoracic Surgery  __________________________________________________________________________________  HPI:   Scott Donato is a 54 year old year old male with a PMH of CAD, ICM, HFrEF, pulmonary edema on diuretics, DMT2, Asthma, EtOH abuse, Charcot foot, HLD, smoking, and orthopnea. Scott had presented to [facility] ED on 8/26/24 with chest pain and exertional shortness of breath. Workup included TTE, which showed EF of 10%. Coronary angiogram confirmed severe 3V CAD, his cardiac MRI showed viable myocardium. He was referred to CVTS and deemed an appropriate surgical candidate and underwent 4 v CABG 9/10/24 with IABP  support.   Hospital course was remarkable for hypervolemia and diuretic therapy.  His LV EF at discharge had increased to 30-45%, he was discharged on heart failure GDMT, including entresto, coreg, jardiance.  Patient was discharged home on 9/16/24.    Patient has been doing well since discharge and now returns to clinic for postop visit.    Patient endorses:  - fever, chills, chest pain, edema, SOB, and nausea.   - Was hypotensive and symptomatic previously at cardiac rehab, Entresto was held per cardiology, and his symptoms have resolved.   - Cardiology reduced Bumex to PRN instead of BID.   - Feels like he should be doing better overall than he is doing right now. Still having some mental and physical fatigue.   - Sleeping is still kind of sporadic with an inconsistent schedule.     Patient reports incision is healing well.  Denies sternal popping or clicking or incisional drainage/erythema.  Patient denies any fever, chills, chest pain, palpitations, edema, SOB, lightheadedness, and nausea.    Patient is having firm bowel movements and voiding without problems.    Has been attending cardiac rehabilitation and that is going well.    Drinking about 1 liter of water a day.     Patient is not on systemic anticoagulation.   Weight has been stable overall (slightly down from discharge).    Blood glucose levels have been under good control, < 150.     PAST MEDICAL HISTORY:  Past Medical History:   Diagnosis Date     Asthma      CAD (coronary artery disease)      Charcot foot due to diabetes mellitus (H)      Chronic kidney disease      DM2 (diabetes mellitus, type 2) (H)      Dyspnea on exertion      Former smoker      Heart failure with reduced ejection fraction, NYHA class III (H)      Hyperlipidemia LDL goal <100      Orthopnea        PAST SURGICAL HISTORY:  Past Surgical History:   Procedure Laterality Date     Amputation of foot Right 2023     APPENDECTOMY       BYPASS GRAFT ARTERY CORONARY N/A 9/10/2024     Procedure: median sternomy, coronary artery bypass graft X 4, Left internal mammary haverst, Left endoscopic saphenous emory havest, on cardiopulmonary bypass, Transesophageal Echocardiogram, Right femoral balloon pump and right fluroscopy.;  Surgeon: Evan Giles MD;  Location: [facility] OR     COLONOSCOPY       INSERT INTRAAORTIC BALLOON PUMP N/A 9/10/2024    Procedure: Insert intraaortic balloon pump;  Surgeon: Evan Giles MD;  Location: [facility] OR     IRRIGATION AND DEBRIDEMENT Left     foot       CURRENT MEDICATIONS:   Current Outpatient Medications   Medication Sig Dispense Refill     acetaminophen (TYLENOL) 500 MG tablet Take 1.5 tablets (750 mg) by mouth every 6 hours as needed for other (For optimal non-opioid multimodal pain management to improve pain control.).       albuterol (PROAIR HFA/PROVENTIL HFA/VENTOLIN HFA) 108 (90 Base) MCG/ACT inhaler Inhale 2 puffs into the lungs every 4 hours as needed       aspirin (ASA) 81 MG chewable tablet Take 2 tablets (162 mg) by mouth daily. 180 tablet 0     atorvastatin (LIPITOR) 40 MG tablet Take 40 mg by mouth at bedtime.       budesonide-formoterol (SYMBICORT) 80-4.5 MCG/ACT Inhaler Inhale 2 puffs into the lungs as needed       Calcium Carbonate-Vitamin D (OYSTER SHELL CALCIUM/D) 500-5 MG-MCG TABS Take 1 tablet by mouth daily       carvedilol (COREG) 3.125 MG tablet Take 2 tablets (6.25 mg) by mouth 2 times daily.       cetirizine (ZYRTEC) 10 MG tablet Take 10 mg by mouth daily       DOXYCYCLINE HYCLATE PO Take 100 mg by mouth every morning       empagliflozin (JARDIANCE) 10 MG TABS tablet Take 1 tablet (10 mg) by mouth daily. 30 tablet 1     EPINEPHrine (ANY BX GENERIC EQUIV) 0.3 MG/0.3ML injection 2-pack Inject 0.3 mg into the muscle as needed for anaphylaxis       eplerenone (INSPRA) 25 MG tablet Take 25 mg by mouth every morning.       methocarbamol (ROBAXIN) 750 MG tablet Take 1 tablet (750 mg) by mouth every 6 hours as needed for muscle spasms or  "other (pain). 90 tablet 0     oxyCODONE (ROXICODONE) 5 MG tablet Take 1 tablet (5 mg) by mouth every 6 hours as needed for severe pain. 15 tablet 0     OZEMPIC, 2 MG/DOSE, 8 MG/3ML pen Inject 2 mg subcutaneously every 7 days. Fridays  Last dose: 8/31       polyethylene glycol (MIRALAX) 17 GM/Dose powder Take 17 g by mouth daily as needed for constipation. 510 g 0     senna-docusate (SENOKOT-S/PERICOLACE) 8.6-50 MG tablet Take 2 tablets by mouth or Feeding Tube 2 times daily as needed for constipation. 30 tablet 0     bumetanide (BUMEX) 1 MG tablet Take 1 mg by mouth as needed (swelling, SOB). (Patient not taking: Reported on 10/1/2024)       No current facility-administered medications for this visit.       ALLERGIES:      Allergies   Allergen Reactions     Bee Venom Anaphylaxis     Perflutren Lipid Microspheres Other (See Comments)     Severe lower back pain (Definity)    Other Reaction(s): Other (see comments)      Severe lower back pain (Definity)       ROS:  Review of symptoms otherwise negative unless commented about in HPI.     LABS:  Last Basic Metabolic Panel:  Lab Results   Component Value Date     09/16/2024      Lab Results   Component Value Date    POTASSIUM 3.5 09/16/2024    POTASSIUM 4.9 09/10/2024     Lab Results   Component Value Date    CHLORIDE 102 09/16/2024     Lab Results   Component Value Date    ANITA 8.3 09/16/2024     Lab Results   Component Value Date    CO2 28 09/16/2024     Lab Results   Component Value Date    BUN 18.7 09/16/2024     Lab Results   Component Value Date    CR 1.31 09/16/2024     Lab Results   Component Value Date     09/16/2024     09/16/2024       Last CBC:   Lab Results   Component Value Date    WBC 4.4 09/16/2024     Lab Results   Component Value Date    RBC 2.92 09/16/2024     Lab Results   Component Value Date    HGB 9.2 09/16/2024     Lab Results   Component Value Date    HCT 27.6 09/16/2024     No components found for: \"MCT\"  Lab Results "   Component Value Date    MCV 95 09/16/2024     Lab Results   Component Value Date    MCH 31.5 09/16/2024     Lab Results   Component Value Date    MCHC 33.3 09/16/2024     Lab Results   Component Value Date    RDW 13.7 09/16/2024     Lab Results   Component Value Date     09/16/2024       IMAGING:  None    PHYSICAL EXAM:   /76 (BP Location: Right arm, Patient Position: Chair, Cuff Size: Adult Regular)   Pulse 95   Wt 103.8 kg (228 lb 14.4 oz)   SpO2 99%   BMI 29.39 kg/m    General: alert and oriented x 3, pleasant, no acute distress, normal mood and affect  Neuro: no focal deficits   CV: S1 S2, no murmurs, rubs or gallops, regular rate and rhythm, no peripheral edema  Pulm: bilateral breath sounds, clear to auscultation, easy work of breathing.  Incision: incisions clean dry and intact without erythema, swelling or drainage. Has a lot of adhesive on his skin surrounding incisions.     PROCEDURES:  None         CC  Fran Irwin               Please do not hesitate to contact me if you have any questions/concerns.     Sincerely,     Cardiovascular Thoracic Surgery

## 2024-10-01 NOTE — NURSING NOTE
Chief Complaint   Patient presents with    Follow Up     POST-OP - CABG x4 w         Vitals were taken, medications reconciled.    Vanesa Marquez, Clinic Assistant   2:52 PM

## 2024-10-01 NOTE — PATIENT INSTRUCTIONS
Hemodynamics are stable. He has soft blood pressures but is no longer symptomatic since stopping Entresto.   Follow up with your PCP (Dr Rony Irwin, [facility] [place]) for all future medication refills and general medical care as scheduled tomorrow.   You have already been seen in Follow up with your cardiology team for Chronic systolic heart failure (Irene Larry NP, 9/30/24). Cardiology had previously held Entresto for orthostatic hypoTN (resolved) and now switched bumex to 'as needed' for leg swelling or shortness of breath.  Coreg increased to 6.25 mg BID. Return to Cardiology CORE clinic as directed in about 3 months.   Sleep schedule: try to get on to a consistent schedule and bedtime.   Continue Outpatient Cardiac Rehab until completed.   Continue sternal precautions for 12 weeks from surgery date.   Return to work: protocol is 3 months off, but can return as tolerated after 8 weeks. Must be able to perform job duties like consistent lifting, reaching, bending, and efficient performance without mistakes. Flexible hours or limited duty may be better tolerated while you build back your endurance.   Per recommendations, no driving for 4 weeks from surgery date. He has been driving and it is going well so far, able to drive safely without pain and discomfort.

## 2024-10-01 NOTE — PROGRESS NOTES
10/1/24 - Received non urgent referral from Irene Larry NP to establish care with HF MD.  She will be seeing patient again in 3 months so would like MD appt sometime before she sees him again.    Pt had CABG 9/10/24.  EF 35-40% 9/10/24.  Pt is attending cardiac rehab.    10/4/24 - Spoke with patient.  Offered appt on Friday, 11/15 with labs prior

## 2024-10-02 ENCOUNTER — HOSPITAL ENCOUNTER (OUTPATIENT)
Dept: CARDIAC REHAB | Facility: CLINIC | Age: 54
Discharge: HOME | End: 2024-10-02
Attending: THORACIC SURGERY (CARDIOTHORACIC VASCULAR SURGERY)
Payer: COMMERCIAL

## 2024-10-02 PROCEDURE — 93798 PHYS/QHP OP CAR RHAB W/ECG: CPT

## 2024-10-04 ENCOUNTER — HOSPITAL ENCOUNTER (OUTPATIENT)
Dept: CARDIAC REHAB | Facility: CLINIC | Age: 54
Discharge: HOME | End: 2024-10-04
Attending: THORACIC SURGERY (CARDIOTHORACIC VASCULAR SURGERY)
Payer: COMMERCIAL

## 2024-10-04 ENCOUNTER — TELEPHONE (OUTPATIENT)
Dept: CARDIOLOGY | Facility: CLINIC | Age: 54
End: 2024-10-04
Payer: COMMERCIAL

## 2024-10-04 PROCEDURE — 93798 PHYS/QHP OP CAR RHAB W/ECG: CPT

## 2024-10-04 NOTE — TELEPHONE ENCOUNTER
----- Message from Jovita HICKS sent at 10/1/2024 11:52 AM CDT -----  Hello-    This pt needs a Return CORE appt  in 3 months with labs prior (preferably with Irene Larry) please!    Thanks!    Jovita

## 2024-10-04 NOTE — TELEPHONE ENCOUNTER
10/4/2024 4:44PM Pallavi Solis  Patient confirmed scheduled appointment:  Date: 1/13/2025  Time: 1PM  Visit type: Return CORE  Provider: TRACY Munoz CNP  [place] [address] [place] [address]  Testing/imaging: Labs (1st Floor Imaging) prior at 12PM  Additional notes: 10/4 Scheduled Return CORE w/ Irene Larry 1/13/2025 w/ labs prior. MOSES Solis 10/4/2024 4:44PM

## 2024-10-09 ENCOUNTER — HOSPITAL ENCOUNTER (OUTPATIENT)
Dept: CARDIAC REHAB | Facility: CLINIC | Age: 54
Discharge: HOME | End: 2024-10-09
Attending: THORACIC SURGERY (CARDIOTHORACIC VASCULAR SURGERY)
Payer: COMMERCIAL

## 2024-10-09 LAB — GLUCOSE BLDC GLUCOMTR-MCNC: 116 MG/DL (ref 70–99)

## 2024-10-09 PROCEDURE — 93798 PHYS/QHP OP CAR RHAB W/ECG: CPT

## 2024-10-11 ENCOUNTER — HOSPITAL ENCOUNTER (OUTPATIENT)
Dept: CARDIAC REHAB | Facility: CLINIC | Age: 54
Discharge: HOME | End: 2024-10-11
Attending: THORACIC SURGERY (CARDIOTHORACIC VASCULAR SURGERY)
Payer: COMMERCIAL

## 2024-10-11 PROCEDURE — 93798 PHYS/QHP OP CAR RHAB W/ECG: CPT

## 2024-10-14 ENCOUNTER — HOSPITAL ENCOUNTER (OUTPATIENT)
Dept: CARDIAC REHAB | Facility: CLINIC | Age: 54
Discharge: HOME | End: 2024-10-14
Attending: THORACIC SURGERY (CARDIOTHORACIC VASCULAR SURGERY)
Payer: COMMERCIAL

## 2024-10-14 PROCEDURE — 93798 PHYS/QHP OP CAR RHAB W/ECG: CPT

## 2024-10-16 ENCOUNTER — HOSPITAL ENCOUNTER (OUTPATIENT)
Dept: CARDIAC REHAB | Facility: CLINIC | Age: 54
Discharge: HOME | End: 2024-10-16
Attending: THORACIC SURGERY (CARDIOTHORACIC VASCULAR SURGERY)
Payer: COMMERCIAL

## 2024-10-16 ENCOUNTER — TELEPHONE (OUTPATIENT)
Dept: CARDIOLOGY | Facility: CLINIC | Age: 54
End: 2024-10-16
Payer: COMMERCIAL

## 2024-10-16 PROCEDURE — 93798 PHYS/QHP OP CAR RHAB W/ECG: CPT

## 2024-10-16 NOTE — TELEPHONE ENCOUNTER
[facility] Imaging Requested:   Action Taken ECHO Images  MR Cardiac Images  CCL Coronary Angio Images 6-3-24 and 3-12-24  3-15-24  3-14-24

## 2024-10-16 NOTE — TELEPHONE ENCOUNTER
[facility] Call Center    Phone Message    May a detailed message be left on voicemail: yes     Reason for Call: Other: Pt would like a call back to see if his New HF appt can be moved up      Action Taken: Other: Cardio    Travel Screening: Not Applicable     Date of Service:

## 2024-10-17 NOTE — TELEPHONE ENCOUNTER
10/17/24 - Spoke with patient.  He currently has an appt with Dr. Cazares on 11/15 but needs to go out of town from 11/13-11/16.  Offered him an appt with Dr. Cazares one week earlier on 11/8/24.

## 2024-10-18 ENCOUNTER — HOSPITAL ENCOUNTER (OUTPATIENT)
Dept: CARDIAC REHAB | Facility: CLINIC | Age: 54
Discharge: HOME | End: 2024-10-18
Attending: THORACIC SURGERY (CARDIOTHORACIC VASCULAR SURGERY)
Payer: COMMERCIAL

## 2024-10-18 PROCEDURE — 93798 PHYS/QHP OP CAR RHAB W/ECG: CPT

## 2024-10-21 ENCOUNTER — HOSPITAL ENCOUNTER (OUTPATIENT)
Dept: CARDIAC REHAB | Facility: CLINIC | Age: 54
Discharge: HOME | End: 2024-10-21
Attending: THORACIC SURGERY (CARDIOTHORACIC VASCULAR SURGERY)
Payer: COMMERCIAL

## 2024-10-21 PROCEDURE — 93798 PHYS/QHP OP CAR RHAB W/ECG: CPT

## 2024-10-25 ENCOUNTER — HOSPITAL ENCOUNTER (OUTPATIENT)
Dept: CARDIAC REHAB | Facility: CLINIC | Age: 54
Discharge: HOME | End: 2024-10-25
Attending: THORACIC SURGERY (CARDIOTHORACIC VASCULAR SURGERY)
Payer: COMMERCIAL

## 2024-10-25 PROCEDURE — 93798 PHYS/QHP OP CAR RHAB W/ECG: CPT

## 2024-11-01 ENCOUNTER — HOSPITAL ENCOUNTER (OUTPATIENT)
Dept: CARDIAC REHAB | Facility: CLINIC | Age: 54
Discharge: HOME | End: 2024-11-01
Attending: THORACIC SURGERY (CARDIOTHORACIC VASCULAR SURGERY)
Payer: COMMERCIAL

## 2024-11-01 DIAGNOSIS — I50.22 CHRONIC SYSTOLIC HEART FAILURE (H): Primary | ICD-10-CM

## 2024-11-01 PROCEDURE — 93798 PHYS/QHP OP CAR RHAB W/ECG: CPT

## 2024-11-04 ENCOUNTER — HOSPITAL ENCOUNTER (OUTPATIENT)
Dept: CARDIAC REHAB | Facility: CLINIC | Age: 54
Discharge: HOME | End: 2024-11-04
Attending: THORACIC SURGERY (CARDIOTHORACIC VASCULAR SURGERY)
Payer: COMMERCIAL

## 2024-11-04 PROCEDURE — 93798 PHYS/QHP OP CAR RHAB W/ECG: CPT

## 2024-11-08 ENCOUNTER — TELEPHONE (OUTPATIENT)
Dept: CARDIOLOGY | Facility: CLINIC | Age: 54
End: 2024-11-08

## 2024-11-08 ENCOUNTER — OFFICE VISIT (OUTPATIENT)
Dept: CARDIOLOGY | Facility: CLINIC | Age: 54
End: 2024-11-08
Attending: INTERNAL MEDICINE
Payer: COMMERCIAL

## 2024-11-08 ENCOUNTER — LAB (OUTPATIENT)
Dept: LAB | Facility: CLINIC | Age: 54
End: 2024-11-08
Payer: COMMERCIAL

## 2024-11-08 VITALS
HEART RATE: 91 BPM | BODY MASS INDEX: 30.56 KG/M2 | OXYGEN SATURATION: 99 % | SYSTOLIC BLOOD PRESSURE: 121 MMHG | WEIGHT: 238 LBS | DIASTOLIC BLOOD PRESSURE: 80 MMHG

## 2024-11-08 DIAGNOSIS — I25.10 CORONARY ARTERY DISEASE INVOLVING NATIVE CORONARY ARTERY OF NATIVE HEART WITHOUT ANGINA PECTORIS: ICD-10-CM

## 2024-11-08 DIAGNOSIS — Z95.1 S/P CABG X 4: ICD-10-CM

## 2024-11-08 DIAGNOSIS — E78.2 MIXED HYPERLIPIDEMIA: ICD-10-CM

## 2024-11-08 DIAGNOSIS — I50.22 CHRONIC SYSTOLIC HEART FAILURE (H): ICD-10-CM

## 2024-11-08 DIAGNOSIS — I50.42 CHRONIC COMBINED SYSTOLIC (CONGESTIVE) AND DIASTOLIC (CONGESTIVE) HEART FAILURE (H): Primary | ICD-10-CM

## 2024-11-08 DIAGNOSIS — I25.5 ISCHEMIC CARDIOMYOPATHY: ICD-10-CM

## 2024-11-08 LAB
ANION GAP SERPL CALCULATED.3IONS-SCNC: 9 MMOL/L (ref 7–15)
BUN SERPL-MCNC: 12.9 MG/DL (ref 6–20)
CALCIUM SERPL-MCNC: 9.2 MG/DL (ref 8.8–10.4)
CHLORIDE SERPL-SCNC: 106 MMOL/L (ref 98–107)
CREAT SERPL-MCNC: 1.12 MG/DL (ref 0.67–1.17)
EGFRCR SERPLBLD CKD-EPI 2021: 78 ML/MIN/1.73M2
GLUCOSE SERPL-MCNC: 142 MG/DL (ref 70–99)
HCO3 SERPL-SCNC: 26 MMOL/L (ref 22–29)
MAGNESIUM SERPL-MCNC: 2 MG/DL (ref 1.7–2.3)
NT-PROBNP SERPL-MCNC: 1065 PG/ML (ref 0–900)
POTASSIUM SERPL-SCNC: 4.4 MMOL/L (ref 3.4–5.3)
SODIUM SERPL-SCNC: 141 MMOL/L (ref 135–145)

## 2024-11-08 PROCEDURE — 80048 BASIC METABOLIC PNL TOTAL CA: CPT | Performed by: PATHOLOGY

## 2024-11-08 PROCEDURE — 36415 COLL VENOUS BLD VENIPUNCTURE: CPT | Performed by: PATHOLOGY

## 2024-11-08 PROCEDURE — 83735 ASSAY OF MAGNESIUM: CPT | Performed by: PATHOLOGY

## 2024-11-08 PROCEDURE — 99205 OFFICE O/P NEW HI 60 MIN: CPT | Mod: 24 | Performed by: INTERNAL MEDICINE

## 2024-11-08 PROCEDURE — 99211 OFF/OP EST MAY X REQ PHY/QHP: CPT | Performed by: INTERNAL MEDICINE

## 2024-11-08 PROCEDURE — 83880 ASSAY OF NATRIURETIC PEPTIDE: CPT | Performed by: PATHOLOGY

## 2024-11-08 RX ORDER — CHOLECALCIFEROL (VITAMIN D3) 50 MCG
1 TABLET ORAL DAILY
COMMUNITY
Start: 2023-01-02

## 2024-11-08 RX ORDER — SACUBITRIL AND VALSARTAN 24; 26 MG/1; MG/1
0.5 TABLET, FILM COATED ORAL 2 TIMES DAILY
Qty: 60 TABLET | Refills: 3 | Status: SHIPPED | OUTPATIENT
Start: 2024-11-08

## 2024-11-08 ASSESSMENT — PAIN SCALES - GENERAL: PAINLEVEL_OUTOF10: NO PAIN (0)

## 2024-11-08 NOTE — TELEPHONE ENCOUNTER
Health Call Center    Phone Message    May a detailed message be left on voicemail: yes     Reason for Call: Medication Question or concern regarding medication   Prescription Clarification  Name of Medication: sacubitril-valsartan (ENTRESTO) 24-26 MG per tablet   Prescribing Provider: Dr. Cazares   Pharmacy: [facility] [place], [place] - [address] [facility][1]     What on the order needs clarification? Pharmacy wants to make sure that the dose decrease is accurate. Please call the pharmacy to discuss.       Action Taken: Other: cardiology    Travel Screening: Not Applicable  Thank you!  [facility]       Date of Service:

## 2024-11-08 NOTE — PATIENT INSTRUCTIONS
Cardiology Providers you saw during your visit:  Dr. Cazares     Medication changes:  1- Restart Entresto at 12/13 mg twice daily (one half tab twice daily)   2- no more Bumex once you start Entresto      Follow up:  1- Home blood pressure monitor order given to you  2- Labs in one week   3- one month follow up either with Dr. Cazares or one of our Heart Failure Nurse Practitioners        Please call if you have:  1. Weight gain of more than 2 pounds in a day or 5 pounds in a week  2. Increased shortness of breath, swelling or bloating  3. Dizziness, lightheadedness   4. Any questions or concerns.        Follow the American Heart Association Diet and Lifestyle recommendations:  Limit saturated fat, trans fat, sodium, red meat, sweets and sugar-sweetened beverages. If you choose to eat red meat, compare labels and select the leanest cuts available.  Aim for at least 150 minutes of moderate physical activity or 75 minutes of vigorous physical activity - or an equal combination of both - each week.     During business hours: [phone], press option # 1 to schedule an appointment or send a message to your care team     After hours, weekends or holidays: On Call Cardiologist- [phone]   option #4 and ask to speak to the on-call Cardiologist. Inform them you are a CORE/heart failure patient at the [facility].     Jacinta Kahn RN BSN CHFN  Cardiology Nurse Care Coordinator (Heart Failure / C.O.R.E.)

## 2024-11-08 NOTE — LETTER
2024      RE: Scott Donato  [address]  [address]  [facility] [address]       Dear Colleague,    Thank you for the opportunity to participate in the care of your patient, Scott Donato, at the [facility] HEART [facility] [place] [facility]. Please see a copy of my visit note below.    [facility]  Advanced Heart Failure Clinic    Patient Name: Scott Donato   : 1970   Date of Visit: 2024    Primary Care Physician: Fran Irwin MD     Referring Physician: TRACY Munoz  Reason for Visit: ischemic cardiomyopathy    Dear TRACY Munoz and Dr. Irwin,     I had the pleasure of seeing Scott Donato today in the [facility] Advanced [facility] Failure Clinic. As you know Scott Donato is a pleasant 54 year old year old male, who presents today for further evaluation of ischemic cardiomyopathy.    Scott has a history of HFrEF 2/2 ICM (EF 35-40%, ernie 10%), CAD s/p CABG x4 (LIMA to LAD, SVG to D1, SVG to PDA, SVG to OM2, 9/10/24), DM2, and asthma     He presents to establish HF care.    Today he reports feeling well. He is attending cardiac rehab.    He feels great exercising on his own. He plans to stop cardiac rehab as he knows what he needs to do and plans to exercise on his own, on his own time. He is joining a gym.   He denies chest pain, shortness of breath, PND/orthopnea, palpitations, lightheadedness, syncope. He has occasional leg swelling, and takes bumex as needed, took one dose last week and plans to take one today for his abdomen feeling full and his voice feels raspy which he believes happens when he is holding full.  His previous bumex dose was weeks ago. Compliant with medications and notes no problems taking them.    For symptomatic hypotension, his sacubitril/valsartan 24-26 mg BID was discontinued previously.  Since stopping that he has not had lightheadedness and has had more  energy.    Home BPs - giving a prescription today for home BP monitor  Home HRs - not checking  Home weights - he believes his goal is ~229 lbs, has been around 232 lbs, today was 238 lbs here with clothes/ shoes on    ROS:  A complete 10-point ROS was negative except as above.    PAST MEDICAL HISTORY:  Past Medical History:   Diagnosis Date     Asthma      CAD (coronary artery disease)      Charcot foot due to diabetes mellitus (H)      Chronic kidney disease      DM2 (diabetes mellitus, type 2) (H)      Dyspnea on exertion      Former smoker      Heart failure with reduced ejection fraction, NYHA class III (H)      Hyperlipidemia LDL goal <100      Orthopnea        PAST SURGICAL HISTORY:  Past Surgical History:   Procedure Laterality Date     Amputation of foot Right 2023     APPENDECTOMY       BYPASS GRAFT ARTERY CORONARY N/A 9/10/2024    Procedure: median sternomy, coronary artery bypass graft X 4, Left internal mammary haverst, Left endoscopic saphenous emory havest, on cardiopulmonary bypass, Transesophageal Echocardiogram, Right femoral balloon pump and right fluroscopy.;  Surgeon: Evan Giles MD;  Location: [facility] OR     COLONOSCOPY       INSERT INTRAAORTIC BALLOON PUMP N/A 9/10/2024    Procedure: Insert intraaortic balloon pump;  Surgeon: Evan Giles MD;  Location: [facility] OR     IRRIGATION AND DEBRIDEMENT Left     foot       FAMILY HISTORY:  Family History   Problem Relation Age of Onset     Anesthesia Reaction Mother         Slow to wake     Coronary Artery Disease Maternal Grandfather      Coronary Artery Disease Paternal Grandmother      Coronary Artery Disease Paternal Grandfather      Lung Cancer Paternal Grandfather      Substance Abuse Son      Alcoholism Maternal Aunt      Arthritis Maternal Aunt      Alcoholism Maternal Uncle      Clotting Disorder No family hx of      Bleeding Disorder No family hx of        SOCIAL HISTORY:  Social History     Socioeconomic History     Marital  status: Single     Spouse name: None     Number of children: None     Years of education: None     Highest education level: None   Tobacco Use     Smoking status: Former     Current packs/day: 0.00     Types: Cigarettes     Quit date: 2018     Years since quittin.8     Smokeless tobacco: Never   Substance and Sexual Activity     Alcohol use: Yes     Comment: 2x a year     Drug use: Not Currently     Social Drivers of Health     Financial Resource Strain: Low Risk  (9/15/2024)    Financial Resource Strain      Within the past 12 months, have you or your family members you live with been unable to get utilities (heat, electricity) when it was really needed?: No   Food Insecurity: Not on File (2024)    Received from [facility]    Food Insecurity      Food: 0   Transportation Needs: Low Risk  (9/15/2024)    Transportation Needs      Within the past 12 months, has lack of transportation kept you from medical appointments, getting your medicines, non-medical meetings or appointments, work, or from getting things that you need?: No   Physical Activity: Not on File (12/15/2023)    Received from [facility]    Physical Activity      Physical Activity: 0   Stress: Not on File (12/15/2023)    Received from [facility]    Stress      Stress: 0   Social Connections: Not on File (2024)    Received from [facility]    Social Connections      Connectedness: 0   Interpersonal Safety: Low Risk  (9/10/2024)    Interpersonal Safety      Do you feel physically and emotionally safe where you currently live?: Yes      Within the past 12 months, have you been hit, slapped, kicked or otherwise physically hurt by someone?: No      Within the past 12 months, have you been humiliated or emotionally abused in other ways by your partner or ex-partner?: No   Housing Stability: High Risk (9/15/2024)    Housing Stability      Do you have housing? : No      Are you worried about losing your housing?: No       MEDICATIONS:  Current Outpatient Medications    Medication Sig Dispense Refill     acetaminophen (TYLENOL) 500 MG tablet Take 1.5 tablets (750 mg) by mouth every 6 hours as needed for other (For optimal non-opioid multimodal pain management to improve pain control.).       albuterol (PROAIR HFA/PROVENTIL HFA/VENTOLIN HFA) 108 (90 Base) MCG/ACT inhaler Inhale 2 puffs into the lungs every 4 hours as needed       aspirin (ASA) 81 MG chewable tablet Take 2 tablets (162 mg) by mouth daily. 180 tablet 0     atorvastatin (LIPITOR) 40 MG tablet Take 40 mg by mouth at bedtime.       budesonide-formoterol (SYMBICORT) 80-4.5 MCG/ACT Inhaler Inhale 2 puffs into the lungs as needed       bumetanide (BUMEX) 1 MG tablet Take 1 mg by mouth as needed (swelling, SOB).       Calcium Carbonate-Vitamin D (OYSTER SHELL CALCIUM/D) 500-5 MG-MCG TABS Take 1 tablet by mouth daily       carvedilol (COREG) 3.125 MG tablet Take 2 tablets (6.25 mg) by mouth 2 times daily.       cetirizine (ZYRTEC) 10 MG tablet Take 10 mg by mouth daily       DOXYCYCLINE HYCLATE PO Take 100 mg by mouth every morning       empagliflozin (JARDIANCE) 10 MG TABS tablet Take 1 tablet (10 mg) by mouth daily. 30 tablet 1     EPINEPHrine (ANY BX GENERIC EQUIV) 0.3 MG/0.3ML injection 2-pack Inject 0.3 mg into the muscle as needed for anaphylaxis       eplerenone (INSPRA) 25 MG tablet Take 25 mg by mouth every morning.       methocarbamol (ROBAXIN) 750 MG tablet Take 1 tablet (750 mg) by mouth every 6 hours as needed for muscle spasms or other (pain). 90 tablet 0     oxyCODONE (ROXICODONE) 5 MG tablet Take 1 tablet (5 mg) by mouth every 6 hours as needed for severe pain. 15 tablet 0     OZEMPIC, 2 MG/DOSE, 8 MG/3ML pen Inject 2 mg subcutaneously every 7 days. Fridays  Last dose: 8/31       polyethylene glycol (MIRALAX) 17 GM/Dose powder Take 17 g by mouth daily as needed for constipation. 510 g 0     senna-docusate (SENOKOT-S/PERICOLACE) 8.6-50 MG tablet Take 2 tablets by mouth or Feeding Tube 2 times daily as needed  for constipation. 30 tablet 0     Vitamin D3 50 mcg (2000 units) tablet Take 1 tablet by mouth daily.       No current facility-administered medications for this visit.        ALLERGIES:     Allergies   Allergen Reactions     Bee Venom Anaphylaxis     Perflutren Lipid Microspheres Other (See Comments)     Severe lower back pain (Definity)    Other Reaction(s): Other (see comments)      Severe lower back pain (Definity)       PHYSICAL EXAM:  /80 (BP Location: Right arm, Patient Position: Chair, Cuff Size: Adult Regular)   Pulse 91   Wt 108 kg (238 lb)   SpO2 99%   BMI 30.56 kg/m    Gen: alert, interactive, NAD  Neck: supple, no significant JVD  CV: RRR, no m/r/g  Chest: CTAB, no wheezes or crackles  Ext: trace LE edema    LABS:    CMP  Last Comprehensive Metabolic Panel:  Sodium   Date Value Ref Range Status   11/08/2024 141 135 - 145 mmol/L Final     Potassium   Date Value Ref Range Status   11/08/2024 4.4 3.4 - 5.3 mmol/L Final     Potassium POCT   Date Value Ref Range Status   09/10/2024 4.9 3.4 - 5.3 mmol/L Final     Chloride   Date Value Ref Range Status   11/08/2024 106 98 - 107 mmol/L Final     Carbon Dioxide (CO2)   Date Value Ref Range Status   11/08/2024 26 22 - 29 mmol/L Final     Anion Gap   Date Value Ref Range Status   11/08/2024 9 7 - 15 mmol/L Final     Glucose   Date Value Ref Range Status   11/08/2024 142 (H) 70 - 99 mg/dL Final     GLUCOSE BY METER POCT   Date Value Ref Range Status   10/09/2024 116 (H) 70 - 99 mg/dL Final     Urea Nitrogen   Date Value Ref Range Status   11/08/2024 12.9 6.0 - 20.0 mg/dL Final     Creatinine   Date Value Ref Range Status   11/08/2024 1.12 0.67 - 1.17 mg/dL Final     GFR Estimate   Date Value Ref Range Status   11/08/2024 78 >60 mL/min/1.73m2 Final     Comment:     eGFR calculated using 2021 CKD-EPI equation.     Calcium   Date Value Ref Range Status   11/08/2024 9.2 8.8 - 10.4 mg/dL Final     Comment:     Reference intervals for this test were updated on  "2024 to reflect our healthy population more accurately. There may be differences in the flagging of prior results with similar values performed with this method. Those prior results can be interpreted in the context of the updated reference intervals.     Bilirubin Total   Date Value Ref Range Status   09/15/2024 0.6 <=1.2 mg/dL Final     Alkaline Phosphatase   Date Value Ref Range Status   09/15/2024 107 40 - 150 U/L Final     ALT   Date Value Ref Range Status   09/15/2024 6 0 - 70 U/L Final     AST   Date Value Ref Range Status   09/15/2024 23 0 - 45 U/L Final       NT-proBNP       TROP  No results found for: \"TROPI\", \"TROPONIN\", \"TROPR\", \"TROPN\"    CBC  CBC RESULTS:   Recent Labs   Lab Test 24  0619   WBC 4.4   RBC 2.92*   HGB 9.2*   HCT 27.6*   MCV 95   MCH 31.5   MCHC 33.3   RDW 13.7          LIPIDS  No results for input(s): \"CHOL\", \"HDL\", \"LDL\", \"TRIG\", \"CHOLHDLRATIO\" in the last 93459 hours.    TSH  No results found for: \"TSH\"    HBA1C  No results found for: \"A1C\"      CARDIAC DATA:    EK/10/2024: Heart rate 103 bpm, sinus tachycardia, left axis deviation, inferior infarct    Echo:  6/3/2024, [facility]   The estimated left ventricular ejection fraction is 21%.   Tricuspid regurgitation jet is not adequate for estimation of PA systolic   pressure.   Based on IVC geometry, the right atrial pressure is probably upper limit   of normal/mildly increased   Left ventricular enlargement .   Decreased left ventricular systolic performance, severe   Est Stroke volume 48 cc   Dilated cardiomyopathy .   Regional wall motion abnormality-inferolateral .     9/10/2024, [facility]  Moderately reduced LV systolic function w/ LVEF 30%. Normal RV systolic   function. Grade I DD. No RWMAs visualized. No AS or AI. Mild MR. No   pericardial effusion. No aortic dissection.     2024, [facility]  Left ventricular function is decreased. The ejection fraction is 35-40%  (moderately reduced).  Mid to apical anteroseptum is " akinetic.  The right ventricle is normal size.  Global right ventricular function is mildly reduced.  The inferior vena cava was normal in size with preserved respiratory  variability.  No significant valvular abnormalities present.  Previous study not available for comparison    Stress Test:  None available    Cardiac MRI:  3/15/2024, [facility] [place]    1) Severely reduced left ventricular ejection fraction, calculated EF 14%   2) Dilated LV cavity with asynchronous septal motion consistent with left bundle-branch block.   3) Transmural late gadolinium hyperenhancement involving the apical inferior wall, and subendocardial pattern of hyperenhancement involving about 50% of the mid inferolateral wall. Apart from the apical inferior wall, there is preserved myocardial viability in all other distributions.   4) Small pericardial effusion and bilateral pleural effusions.     Cardiac Catheterization:  3/14/2024, [facility] [place].    Right heart catheterization -     Mean RA 7 mmHg   RV 39/10 mmHg   PA 43/17 mmHg; mean 31 mm Hg   PCWP 22-23 mmHg     Cardiac output 3.5 L/min by thermodilution method (cardiac index 1.5   L/min/m^2)   Cardiac output 4.4 L/min by estimated Yaritza method (PA saturation 72 %,   Index 1.9 L/min/m^2)     Please find complete angiographic detail below   LVEDP following angiography is 24 mmHg     Coronary angiogram-  Diffuse three-vessel coronary artery disease with severe stenoses noted in   all 3 major coronary territories     ASSESSMENT/PLAN:  In summary, Scott Donato is here today with the following -      1.  HFrEF, chronic combined systolic and diastolic heart failure, ACC/AHA stage C, NYHA class III, LVEF 35 to 40%  2.  Ischemic cardiomyopathy  3.  Coronary artery disease, status post CABG September 2024  4.  Hypertension  5.  Mixed hyperlipidemia     Plans-  HFrEF, chronic combined systolic and diastolic heart failure:  Goal Directed Medical Therapies for Heart Failure:     These are indicated irrespective of  the etiology of heart failure.  We discussed the primary medications, their side effects, the care plan including frequent follow-ups  for optimization of therapies with monitoring of blood pressures, weights and lab results     Beta blocker: On carvedilol 6.25 mg twice a day  ACE/ARB/ARNI: Sacubitril/valsartan 24-26 mg BID was discontinued for symptomatic hypotension, he is interested in starting a lower dose - will resume half of low dose BID, check BMP in 1 week  MRA: On eplerenone 25 mg daily  SGLT2 inhibitor: On empagliflozin 10 mg once a day    Diuretics: On Bumex 1 mg daily as needed, advised to not take any with once he starts ARNI due to previous issues with hypotension/ hypovolemia and SWATI while taking both.   Cardiac Rehab: Ongoing, however he plans to stop this and exercise on his own  ICD/ CRT-D: Would not be indicated based on EF greater than 35%  Labs: 1 week and at follow up  Follow up: 1 month [facility] clinic or me  CV Genetic testing: will defer based on ICM    Advised on daily home BP and weight monitoring  Advised on observing caution with salt and fluid intake    Coronary artery disease: Status post CABG, on aspirin 162 mg daily and atorvastatin 40 mg daily.      I spent 65 minutes in care of the patient today including obtaining recent medical history, personally reviewing recent cardiac testing and/or lab results, today's examination, discussion of testing results and care recommendations with patient.       Thank you for the opportunity to participate in this patient's care. Please feel free to reach out with any questions or concerns.      Terrence Cazares MD, [facility]  Associate Professor of Medicine  Advanced Heart Failure, LVAD & Cardiac Transplant  Director, Cardio-Obstetrics  Cardio-Oncology  [facility]      Please do not hesitate to contact me if you have any questions/concerns.     Sincerely,     Terrence Cazares MD

## 2024-11-08 NOTE — NURSING NOTE
Labs: Patient was given results of the laboratory testing obtained today. Patient was instructed to return for the next laboratory testing in 1 week. Patient demonstrated understanding of this information and agreed to call with further questions or concerns.   Return Appointment: Patient given instructions regarding scheduling next clinic visit. Patient demonstrated understanding of this information and agreed to call with further questions or concerns.  One month follow up with either Dr. Cazares or [facility].     Medication Change: Patient was educated regarding prescribed medication change, including discussion of the indication, administration, side effects, and when to report to MD or RN. Patient demonstrated understanding of this information and agreed to call with further questions or concerns.  Restart Entresto at 12/13 mg BID    Prescription for home blood pressure monitor given to patient.     Patient stated he understood all health information given and agreed to call with further questions or concerns.

## 2024-11-08 NOTE — TELEPHONE ENCOUNTER
Called pharmacy back to confirm that we are restarting at lower dose as was having lower blood pressures with the higher dose.

## 2024-11-08 NOTE — NURSING NOTE
Chief Complaint   Patient presents with    Follow Up     New Heart Failure; 54 year old with history of HFrEF 2/2 ICM (EF 35-40%, ernie 10%), CAD s/p CABG x4 here for follow up with labs prior       Vitals were taken, medications reconciled.     Osvaldo Sandoval, Clinic Assistant    7:51 AM

## 2024-11-08 NOTE — PROGRESS NOTES
[facility]  Advanced Heart Failure Clinic    Patient Name: Scott Donato   : 1970   Date of Visit: 2024    Primary Care Physician: Fran Irwin MD     Referring Physician: TRACY Munoz  Reason for Visit: ischemic cardiomyopathy    Dear TRACY Munoz and Dr. Irwin,     I had the pleasure of seeing Scott Donato today in the [facility]. As you know Scott Donato is a pleasant 54 year old year old male, who presents today for further evaluation of ischemic cardiomyopathy.    Scott has a history of HFrEF 2/2 ICM (EF 35-40%, ernie 10%), CAD s/p CABG x4 (LIMA to LAD, SVG to D1, SVG to PDA, SVG to OM2, 9/10/24), DM2, and asthma     He presents to establish HF care.    Today he reports feeling well. He is attending cardiac rehab.    He feels great exercising on his own. He plans to stop cardiac rehab as he knows what he needs to do and plans to exercise on his own, on his own time. He is joining a gym.   He denies chest pain, shortness of breath, PND/orthopnea, palpitations, lightheadedness, syncope. He has occasional leg swelling, and takes bumex as needed, took one dose last week and plans to take one today for his abdomen feeling full and his voice feels raspy which he believes happens when he is holding full.  His previous bumex dose was weeks ago. Compliant with medications and notes no problems taking them.    For symptomatic hypotension, his sacubitril/valsartan 24-26 mg BID was discontinued previously.  Since stopping that he has not had lightheadedness and has had more energy.    Home BPs - giving a prescription today for home BP monitor  Home HRs - not checking  Home weights - he believes his goal is ~229 lbs, has been around 232 lbs, today was 238 lbs here with clothes/ shoes on    ROS:  A complete 10-point ROS was negative except as above.    PAST MEDICAL HISTORY:  Past Medical History:   Diagnosis Date    Asthma      CAD (coronary artery disease)     Charcot foot due to diabetes mellitus (H)     Chronic kidney disease     DM2 (diabetes mellitus, type 2) (H)     Dyspnea on exertion     Former smoker     Heart failure with reduced ejection fraction, NYHA class III (H)     Hyperlipidemia LDL goal <100     Orthopnea        PAST SURGICAL HISTORY:  Past Surgical History:   Procedure Laterality Date    Amputation of foot Right     APPENDECTOMY      BYPASS GRAFT ARTERY CORONARY N/A 9/10/2024    Procedure: median sternomy, coronary artery bypass graft X 4, Left internal mammary haverst, Left endoscopic saphenous emory havest, on cardiopulmonary bypass, Transesophageal Echocardiogram, Right femoral balloon pump and right fluroscopy.;  Surgeon: Evan Giles MD;  Location: [facility] OR    COLONOSCOPY      INSERT INTRAAORTIC BALLOON PUMP N/A 9/10/2024    Procedure: Insert intraaortic balloon pump;  Surgeon: Evan Giles MD;  Location: [facility] OR    IRRIGATION AND DEBRIDEMENT Left     foot       FAMILY HISTORY:  Family History   Problem Relation Age of Onset    Anesthesia Reaction Mother         Slow to wake    Coronary Artery Disease Maternal Grandfather     Coronary Artery Disease Paternal Grandmother     Coronary Artery Disease Paternal Grandfather     Lung Cancer Paternal Grandfather     Substance Abuse Son     Alcoholism Maternal Aunt     Arthritis Maternal Aunt     Alcoholism Maternal Uncle     Clotting Disorder No family hx of     Bleeding Disorder No family hx of        SOCIAL HISTORY:  Social History     Socioeconomic History    Marital status: Single     Spouse name: None    Number of children: None    Years of education: None    Highest education level: None   Tobacco Use    Smoking status: Former     Current packs/day: 0.00     Types: Cigarettes     Quit date:      Years since quittin.8    Smokeless tobacco: Never   Substance and Sexual Activity    Alcohol use: Yes     Comment: 2x a year    Drug use: Not  Currently     Social Drivers of Health     Financial Resource Strain: Low Risk  (9/15/2024)    Financial Resource Strain     Within the past 12 months, have you or your family members you live with been unable to get utilities (heat, electricity) when it was really needed?: No   Food Insecurity: Not on File (9/26/2024)    Received from [employer]    Food Insecurity     Food: 0   Transportation Needs: Low Risk  (9/15/2024)    Transportation Needs     Within the past 12 months, has lack of transportation kept you from medical appointments, getting your medicines, non-medical meetings or appointments, work, or from getting things that you need?: No   Physical Activity: Not on File (12/15/2023)    Received from [employer]    Physical Activity     Physical Activity: 0   Stress: Not on File (12/15/2023)    Received from [employer]    Stress     Stress: 0   Social Connections: Not on File (9/13/2024)    Received from [employer]    Social Connections     Connectedness: 0   Interpersonal Safety: Low Risk  (9/10/2024)    Interpersonal Safety     Do you feel physically and emotionally safe where you currently live?: Yes     Within the past 12 months, have you been hit, slapped, kicked or otherwise physically hurt by someone?: No     Within the past 12 months, have you been humiliated or emotionally abused in other ways by your partner or ex-partner?: No   Housing Stability: High Risk (9/15/2024)    Housing Stability     Do you have housing? : No     Are you worried about losing your housing?: No       MEDICATIONS:  Current Outpatient Medications   Medication Sig Dispense Refill    acetaminophen (TYLENOL) 500 MG tablet Take 1.5 tablets (750 mg) by mouth every 6 hours as needed for other (For optimal non-opioid multimodal pain management to improve pain control.).      albuterol (PROAIR HFA/PROVENTIL HFA/VENTOLIN HFA) 108 (90 Base) MCG/ACT inhaler Inhale 2 puffs into the lungs every 4 hours as needed      aspirin (ASA) 81 MG chewable tablet Take 2  tablets (162 mg) by mouth daily. 180 tablet 0    atorvastatin (LIPITOR) 40 MG tablet Take 40 mg by mouth at bedtime.      budesonide-formoterol (SYMBICORT) 80-4.5 MCG/ACT Inhaler Inhale 2 puffs into the lungs as needed      bumetanide (BUMEX) 1 MG tablet Take 1 mg by mouth as needed (swelling, SOB).      Calcium Carbonate-Vitamin D (OYSTER SHELL CALCIUM/D) 500-5 MG-MCG TABS Take 1 tablet by mouth daily      carvedilol (COREG) 3.125 MG tablet Take 2 tablets (6.25 mg) by mouth 2 times daily.      cetirizine (ZYRTEC) 10 MG tablet Take 10 mg by mouth daily      DOXYCYCLINE HYCLATE PO Take 100 mg by mouth every morning      empagliflozin (JARDIANCE) 10 MG TABS tablet Take 1 tablet (10 mg) by mouth daily. 30 tablet 1    EPINEPHrine (ANY BX GENERIC EQUIV) 0.3 MG/0.3ML injection 2-pack Inject 0.3 mg into the muscle as needed for anaphylaxis      eplerenone (INSPRA) 25 MG tablet Take 25 mg by mouth every morning.      methocarbamol (ROBAXIN) 750 MG tablet Take 1 tablet (750 mg) by mouth every 6 hours as needed for muscle spasms or other (pain). 90 tablet 0    oxyCODONE (ROXICODONE) 5 MG tablet Take 1 tablet (5 mg) by mouth every 6 hours as needed for severe pain. 15 tablet 0    OZEMPIC, 2 MG/DOSE, 8 MG/3ML pen Inject 2 mg subcutaneously every 7 days. Fridays  Last dose: 8/31      polyethylene glycol (MIRALAX) 17 GM/Dose powder Take 17 g by mouth daily as needed for constipation. 510 g 0    senna-docusate (SENOKOT-S/PERICOLACE) 8.6-50 MG tablet Take 2 tablets by mouth or Feeding Tube 2 times daily as needed for constipation. 30 tablet 0    Vitamin D3 50 mcg (2000 units) tablet Take 1 tablet by mouth daily.       No current facility-administered medications for this visit.        ALLERGIES:     Allergies   Allergen Reactions    Bee Venom Anaphylaxis    Perflutren Lipid Microspheres Other (See Comments)     Severe lower back pain (Definity)    Other Reaction(s): Other (see comments)      Severe lower back pain (Definity)        PHYSICAL EXAM:  /80 (BP Location: Right arm, Patient Position: Chair, Cuff Size: Adult Regular)   Pulse 91   Wt 108 kg (238 lb)   SpO2 99%   BMI 30.56 kg/m    Gen: alert, interactive, NAD  Neck: supple, no significant JVD  CV: RRR, no m/r/g  Chest: CTAB, no wheezes or crackles  Ext: trace LE edema    LABS:    CMP  Last Comprehensive Metabolic Panel:  Sodium   Date Value Ref Range Status   11/08/2024 141 135 - 145 mmol/L Final     Potassium   Date Value Ref Range Status   11/08/2024 4.4 3.4 - 5.3 mmol/L Final     Potassium POCT   Date Value Ref Range Status   09/10/2024 4.9 3.4 - 5.3 mmol/L Final     Chloride   Date Value Ref Range Status   11/08/2024 106 98 - 107 mmol/L Final     Carbon Dioxide (CO2)   Date Value Ref Range Status   11/08/2024 26 22 - 29 mmol/L Final     Anion Gap   Date Value Ref Range Status   11/08/2024 9 7 - 15 mmol/L Final     Glucose   Date Value Ref Range Status   11/08/2024 142 (H) 70 - 99 mg/dL Final     GLUCOSE BY METER POCT   Date Value Ref Range Status   10/09/2024 116 (H) 70 - 99 mg/dL Final     Urea Nitrogen   Date Value Ref Range Status   11/08/2024 12.9 6.0 - 20.0 mg/dL Final     Creatinine   Date Value Ref Range Status   11/08/2024 1.12 0.67 - 1.17 mg/dL Final     GFR Estimate   Date Value Ref Range Status   11/08/2024 78 >60 mL/min/1.73m2 Final     Comment:     eGFR calculated using 2021 CKD-EPI equation.     Calcium   Date Value Ref Range Status   11/08/2024 9.2 8.8 - 10.4 mg/dL Final     Comment:     Reference intervals for this test were updated on 7/16/2024 to reflect our healthy population more accurately. There may be differences in the flagging of prior results with similar values performed with this method. Those prior results can be interpreted in the context of the updated reference intervals.     Bilirubin Total   Date Value Ref Range Status   09/15/2024 0.6 <=1.2 mg/dL Final     Alkaline Phosphatase   Date Value Ref Range Status   09/15/2024 107 40 - 150  "U/L Final     ALT   Date Value Ref Range Status   09/15/2024 6 0 - 70 U/L Final     AST   Date Value Ref Range Status   09/15/2024 23 0 - 45 U/L Final       NT-proBNP       TROP  No results found for: \"TROPI\", \"TROPONIN\", \"TROPR\", \"TROPN\"    CBC  CBC RESULTS:   Recent Labs   Lab Test 24  0619   WBC 4.4   RBC 2.92*   HGB 9.2*   HCT 27.6*   MCV 95   MCH 31.5   MCHC 33.3   RDW 13.7          LIPIDS  No results for input(s): \"CHOL\", \"HDL\", \"LDL\", \"TRIG\", \"CHOLHDLRATIO\" in the last 66056 hours.    TSH  No results found for: \"TSH\"    HBA1C  No results found for: \"A1C\"      CARDIAC DATA:    EK/10/2024: Heart rate 103 bpm, sinus tachycardia, left axis deviation, inferior infarct    [place]  [place]24, [facility] [place]   The estimated left ventricular ejection fraction is 21%.   Tricuspid regurgitation jet is not adequate for estimation of PA systolic   pressure.   Based on IVC geometry, the right atrial pressure is probably upper limit   of normal/mildly increased   Left ventricular enlargement .   Decreased left ventricular systolic performance, severe   Est Stroke volume 48 cc   Dilated cardiomyopathy .   Regional wall motion abnormality-inferolateral .     9/10/2024, [facility]  Moderately reduced LV systolic function w/ LVEF 30%. Normal RV systolic   function. Grade I DD. No RWMAs visualized. No AS or AI. Mild MR. No   pericardial effusion. No aortic dissection.     2024, [facility]  Left ventricular function is decreased. The ejection fraction is 35-40%  (moderately reduced).  Mid to apical anteroseptum is akinetic.  The right ventricle is normal size.  Global right ventricular function is mildly reduced.  The inferior vena cava was normal in size with preserved respiratory  variability.  No significant valvular abnormalities present.  Previous study not available for comparison    Stress Test:  None available    Cardiac MRI:  3/15/2024, [facility] [place]    1) Severely reduced left ventricular ejection fraction, calculated EF 14%   2) " Dilated LV cavity with asynchronous septal motion consistent with left bundle-branch block.   3) Transmural late gadolinium hyperenhancement involving the apical inferior wall, and subendocardial pattern of hyperenhancement involving about 50% of the mid inferolateral wall. Apart from the apical inferior wall, there is preserved myocardial viability in all other distributions.   4) Small pericardial effusion and bilateral pleural effusions.     Cardiac Catheterization:  3/14/2024, [facility] [place]    Right heart catheterization -     Mean RA 7 mmHg   RV 39/10 mmHg   PA 43/17 mmHg; mean 31 mm Hg   PCWP 22-23 mmHg     Cardiac output 3.5 L/min by thermodilution method (cardiac index 1.5   L/min/m^2)   Cardiac output 4.4 L/min by estimated Yaritza method (PA saturation 72 %,   Index 1.9 L/min/m^2)     Please find complete angiographic detail below   LVEDP following angiography is 24 mmHg     Coronary angiogram-  Diffuse three-vessel coronary artery disease with severe stenoses noted in   all 3 major coronary territories     ASSESSMENT/PLAN:  In summary, Scott Donato is here today with the following -      1.  HFrEF, chronic combined systolic and diastolic heart failure, ACC/AHA stage C, NYHA class III, LVEF 35 to 40%  2.  Ischemic cardiomyopathy  3.  Coronary artery disease, status post CABG September 2024  4.  Hypertension  5.  Mixed hyperlipidemia     Plans-  HFrEF, chronic combined systolic and diastolic heart failure:  Goal Directed Medical Therapies for Heart Failure:     These are indicated irrespective of the etiology of heart failure.  We discussed the primary medications, their side effects, the care plan including frequent follow-ups  for optimization of therapies with monitoring of blood pressures, weights and lab results     Beta blocker: On carvedilol 6.25 mg twice a day  ACE/ARB/ARNI: Sacubitril/valsartan 24-26 mg BID was discontinued for symptomatic hypotension, he is interested in starting a lower dose - will resume  half of low dose BID, check BMP in 1 week  MRA: On eplerenone 25 mg daily  SGLT2 inhibitor: On empagliflozin 10 mg once a day    Diuretics: On Bumex 1 mg daily as needed, advised to not take any with once he starts ARNI due to previous issues with hypotension/ hypovolemia and SWATI while taking both.   Cardiac Rehab: Ongoing, however he plans to stop this and exercise on his own  ICD/ CRT-D: Would not be indicated based on EF greater than 35%  Labs: 1 week and at follow up  Follow up: 1 month [facility] clinic or me  CV Genetic testing: will defer based on ICM    Advised on daily home BP and weight monitoring  Advised on observing caution with salt and fluid intake    Coronary artery disease: Status post CABG, on aspirin 162 mg daily and atorvastatin 40 mg daily.      I spent 65 minutes in care of the patient today including obtaining recent medical history, personally reviewing recent cardiac testing and/or lab results, today's examination, discussion of testing results and care recommendations with patient.       Thank you for the opportunity to participate in this patient's care. Please feel free to reach out with any questions or concerns.      Terrence Cazares MD, [facility]  Associate Professor of Medicine  Advanced Heart Failure, LVAD & Cardiac Transplant  Director, Cardio-Obstetrics  Cardio-Oncology  [facility]

## 2024-11-15 ENCOUNTER — PRE VISIT (OUTPATIENT)
Dept: CARDIOLOGY | Facility: CLINIC | Age: 54
End: 2024-11-15

## 2024-11-18 ENCOUNTER — LAB (OUTPATIENT)
Dept: LAB | Facility: CLINIC | Age: 54
End: 2024-11-18
Payer: COMMERCIAL

## 2024-11-18 DIAGNOSIS — I50.42 CHRONIC COMBINED SYSTOLIC (CONGESTIVE) AND DIASTOLIC (CONGESTIVE) HEART FAILURE (H): ICD-10-CM

## 2024-11-18 LAB
ANION GAP SERPL CALCULATED.3IONS-SCNC: 7 MMOL/L (ref 7–15)
BUN SERPL-MCNC: 15.8 MG/DL (ref 6–20)
CALCIUM SERPL-MCNC: 8.7 MG/DL (ref 8.8–10.4)
CHLORIDE SERPL-SCNC: 106 MMOL/L (ref 98–107)
CREAT SERPL-MCNC: 1.13 MG/DL (ref 0.67–1.17)
EGFRCR SERPLBLD CKD-EPI 2021: 77 ML/MIN/1.73M2
GLUCOSE SERPL-MCNC: 139 MG/DL (ref 70–99)
HCO3 SERPL-SCNC: 26 MMOL/L (ref 22–29)
NT-PROBNP SERPL-MCNC: 486 PG/ML (ref 0–900)
POTASSIUM SERPL-SCNC: 4.7 MMOL/L (ref 3.4–5.3)
SODIUM SERPL-SCNC: 139 MMOL/L (ref 135–145)

## 2024-11-18 PROCEDURE — 83880 ASSAY OF NATRIURETIC PEPTIDE: CPT | Performed by: PATHOLOGY

## 2024-11-18 PROCEDURE — 36415 COLL VENOUS BLD VENIPUNCTURE: CPT | Performed by: PATHOLOGY

## 2024-11-18 PROCEDURE — 80048 BASIC METABOLIC PNL TOTAL CA: CPT | Performed by: PATHOLOGY

## 2024-11-20 ENCOUNTER — TELEPHONE (OUTPATIENT)
Dept: CARDIOLOGY | Facility: CLINIC | Age: 54
End: 2024-11-20
Payer: COMMERCIAL

## 2024-11-20 ENCOUNTER — MYC MEDICAL ADVICE (OUTPATIENT)
Dept: CARDIOLOGY | Facility: CLINIC | Age: 54
End: 2024-11-20
Payer: COMMERCIAL

## 2024-11-20 DIAGNOSIS — I50.42 CHRONIC COMBINED SYSTOLIC (CONGESTIVE) AND DIASTOLIC (CONGESTIVE) HEART FAILURE (H): ICD-10-CM

## 2024-11-20 NOTE — TELEPHONE ENCOUNTER
----- Message from Terrence Cazares sent at 11/19/2024 10:12 AM CST -----  Labs show normal BMP and BNP. If BP are not low and no symptoms of low BP, we can increase ARNI to 24-26 mg BID  He should not need any diuretics - we changed it to PRN  ----- Message -----  From: Lab, Background User  Sent: 11/18/2024   7:48 AM CST  To: Terrence Cazares MD

## 2024-11-20 NOTE — TELEPHONE ENCOUNTER
Called Scott to check in and review labs and recommendations below.     Left message and asked for call back. Will send mychart message.

## 2024-11-21 RX ORDER — SACUBITRIL AND VALSARTAN 24; 26 MG/1; MG/1
1 TABLET, FILM COATED ORAL 2 TIMES DAILY
Qty: 60 TABLET | Refills: 3 | Status: SHIPPED | OUTPATIENT
Start: 2024-11-21

## 2024-11-21 NOTE — TELEPHONE ENCOUNTER
Called Scott again. He notes feeling fine since starting Entresto 12/13 mg BID.   He feels fine, doesn't notice a difference with exception of one day right after starting it had a little dizziness. None since then. We talked about how that can happen early on as his body adjusts to the medication, but if feeling ok now would recommend increasing to 1 full tab twice daily. He is in agreement with this. Advised to call us if he has increased dizziness or lightheadedness. Confirmed he is only taking Bumex PRN.   He reports he did take one bumex today. Was driving for last two days on a trip and he feels bloated. Otherwise hasn't taken it for 2 weeks.     Will clarify if labs needed prior to 12/16 appt.

## 2024-12-16 ENCOUNTER — OFFICE VISIT (OUTPATIENT)
Dept: CARDIOLOGY | Facility: CLINIC | Age: 54
End: 2024-12-16
Attending: INTERNAL MEDICINE
Payer: COMMERCIAL

## 2024-12-16 ENCOUNTER — LAB (OUTPATIENT)
Dept: LAB | Facility: CLINIC | Age: 54
End: 2024-12-16
Payer: COMMERCIAL

## 2024-12-16 VITALS
OXYGEN SATURATION: 97 % | BODY MASS INDEX: 30.34 KG/M2 | HEART RATE: 89 BPM | WEIGHT: 236.3 LBS | DIASTOLIC BLOOD PRESSURE: 75 MMHG | SYSTOLIC BLOOD PRESSURE: 109 MMHG

## 2024-12-16 DIAGNOSIS — E78.2 MIXED HYPERLIPIDEMIA: ICD-10-CM

## 2024-12-16 DIAGNOSIS — I50.42 CHRONIC COMBINED SYSTOLIC (CONGESTIVE) AND DIASTOLIC (CONGESTIVE) HEART FAILURE (H): ICD-10-CM

## 2024-12-16 DIAGNOSIS — Z95.1 S/P CABG X 4: ICD-10-CM

## 2024-12-16 DIAGNOSIS — I50.42 CHRONIC COMBINED SYSTOLIC (CONGESTIVE) AND DIASTOLIC (CONGESTIVE) HEART FAILURE (H): Primary | ICD-10-CM

## 2024-12-16 DIAGNOSIS — I25.5 ISCHEMIC CARDIOMYOPATHY: ICD-10-CM

## 2024-12-16 LAB
ANION GAP SERPL CALCULATED.3IONS-SCNC: 11 MMOL/L (ref 7–15)
BUN SERPL-MCNC: 22.5 MG/DL (ref 6–20)
CALCIUM SERPL-MCNC: 9.6 MG/DL (ref 8.8–10.4)
CHLORIDE SERPL-SCNC: 103 MMOL/L (ref 98–107)
CREAT SERPL-MCNC: 1.37 MG/DL (ref 0.67–1.17)
EGFRCR SERPLBLD CKD-EPI 2021: 61 ML/MIN/1.73M2
GLUCOSE SERPL-MCNC: 111 MG/DL (ref 70–99)
HCO3 SERPL-SCNC: 28 MMOL/L (ref 22–29)
MAGNESIUM SERPL-MCNC: 2.1 MG/DL (ref 1.7–2.3)
NT-PROBNP SERPL-MCNC: 464 PG/ML (ref 0–900)
POTASSIUM SERPL-SCNC: 4.5 MMOL/L (ref 3.4–5.3)
SODIUM SERPL-SCNC: 142 MMOL/L (ref 135–145)

## 2024-12-16 PROCEDURE — 99213 OFFICE O/P EST LOW 20 MIN: CPT | Performed by: INTERNAL MEDICINE

## 2024-12-16 PROCEDURE — 36415 COLL VENOUS BLD VENIPUNCTURE: CPT | Performed by: PATHOLOGY

## 2024-12-16 PROCEDURE — 83735 ASSAY OF MAGNESIUM: CPT | Performed by: PATHOLOGY

## 2024-12-16 PROCEDURE — 83880 ASSAY OF NATRIURETIC PEPTIDE: CPT | Performed by: PATHOLOGY

## 2024-12-16 PROCEDURE — 80048 BASIC METABOLIC PNL TOTAL CA: CPT | Performed by: PATHOLOGY

## 2024-12-16 ASSESSMENT — PAIN SCALES - GENERAL: PAINLEVEL_OUTOF10: NO PAIN (0)

## 2024-12-16 NOTE — PATIENT INSTRUCTIONS
Cardiology Providers you saw during your visit:  Dr. Cazares     Medication changes:  1- No changes at this time           Follow up:  1- Schedule an Echo in 2 weeks  2- Follow up with [facility] as scheduled in January, continue to see about every 2 months  3- Follow up with Dr aCzares in 6 months with labs prior       Please call if you have:  1. Weight gain of more than 2 pounds in a day or 5 pounds in a week  2. Increased shortness of breath, swelling or bloating  3. Dizziness, lightheadedness   4. Any questions or concerns.        Follow the American Heart Association Diet and Lifestyle recommendations:  Limit saturated fat, trans fat, sodium, red meat, sweets and sugar-sweetened beverages. If you choose to eat red meat, compare labels and select the leanest cuts available.  Aim for at least 150 minutes of moderate physical activity or 75 minutes of vigorous physical activity - or an equal combination of both - each week.     During business hours: [phone], press option # 1 to schedule an appointment or send a message to your care team     After hours, weekends or holidays: On Call Cardiologist- [phone]   option #4 and ask to speak to the on-call Cardiologist. Inform them you are a CORE/heart failure patient at the [facility].     Jacinta Kahn RN BSN CHFN  Cardiology Nurse Care Coordinator (Heart Failure / C.O.R.E.)

## 2024-12-16 NOTE — LETTER
2024      RE: Scott Donato  [address]  [address]  [place] [facility] [address]       Dear Colleague,    Thank you for the opportunity to participate in the care of your patient, Scott Donato, at the [facility] [place] at [facility]. Please see a copy of my visit note below.    [facility]  Advanced Heart Failure Clinic    Patient Name: Scott Donato   : 1970   Date of Visit: 2024    Primary Care Physician: Fran Irwin MD     Referring Physician: TRACY Munoz  Reason for Visit: ischemic cardiomyopathy    Dear TRACY Munoz and Dr. Irwin,     I had the pleasure of seeing Scott Donato today in the [facility] Advanced [facility] Failure [facility]. As you know Scott Donato is a pleasant 54 year old year old male, who presents today for further evaluation of ischemic cardiomyopathy.    Scott has a history of HFrEF 2/2 ICM (EF 35-40%, ernie 10%), CAD s/p CABG x4 (LIMA to LAD, SVG to D1, SVG to PDA, SVG to OM2, 9/10/24), DM2, and asthma     He presents to establish HF care.    Today he reports feeling well. He was attending cardiac rehab but planned to join a gym and exercise on his own. He has been back to work full time last 2 weeks. He is on his feet all the time and is wearing a brace on his left foot for [employer] but this has caused an ulcer and is following up with a podiatrist. This has limited his ability to exercise.    However with being on his feet all day, he denies chest pain, shortness of breath, PND/orthopnea, palpitations, lightheadedness, syncope. He has occasional leg swelling, and takes bumex as needed about once per week, took one dose today for his abdomen feeling full and his voice feels raspy which he believes happens when he is holding full. Compliant with medications and notes no problems taking them.      Home BPs - 100s-120s/ 70s-80s  Home HRs - not  checking  Home weights - he believes his goal is ~229 lbs, and takes bumex for weight gain or swelling or abdominal bloating    ROS:  A complete 10-point ROS was negative except as above.    PAST MEDICAL HISTORY:  Past Medical History:   Diagnosis Date     Asthma      CAD (coronary artery disease)      Charcot foot due to diabetes mellitus (H)      Chronic kidney disease      DM2 (diabetes mellitus, type 2) (H)      Dyspnea on exertion      Former smoker      Heart failure with reduced ejection fraction, NYHA class III (H)      Hyperlipidemia LDL goal <100      Orthopnea        PAST SURGICAL HISTORY:  Past Surgical History:   Procedure Laterality Date     Amputation of foot Right 2023     APPENDECTOMY       BYPASS GRAFT ARTERY CORONARY N/A 9/10/2024    Procedure: median sternomy, coronary artery bypass graft X 4, Left internal mammary haverst, Left endoscopic saphenous emory havest, on cardiopulmonary bypass, Transesophageal Echocardiogram, Right femoral balloon pump and right fluroscopy.;  Surgeon: Evan Giles MD;  Location: [facility] OR     COLONOSCOPY       INSERT INTRAAORTIC BALLOON PUMP N/A 9/10/2024    Procedure: Insert intraaortic balloon pump;  Surgeon: Evan Giles MD;  Location: [facility] OR     IRRIGATION AND DEBRIDEMENT Left     foot       FAMILY HISTORY:  Family History   Problem Relation Age of Onset     Anesthesia Reaction Mother         Slow to wake     Coronary Artery Disease Maternal Grandfather      Coronary Artery Disease Paternal Grandmother      Coronary Artery Disease Paternal Grandfather      Lung Cancer Paternal Grandfather      Substance Abuse Son      Alcoholism Maternal Aunt      Arthritis Maternal Aunt      Alcoholism Maternal Uncle      Clotting Disorder No family hx of      Bleeding Disorder No family hx of        SOCIAL HISTORY:  Social History     Socioeconomic History     Marital status: Single     Spouse name: None     Number of children: None     Years of education: None      Highest education level: None   Tobacco Use     Smoking status: Former     Current packs/day: 0.00     Types: Cigarettes     Quit date: 2018     Years since quittin.8     Smokeless tobacco: Never   Substance and Sexual Activity     Alcohol use: Yes     Comment: 2x a year     Drug use: Not Currently     Social Drivers of Health     Financial Resource Strain: Low Risk  (9/15/2024)    Financial Resource Strain      Within the past 12 months, have you or your family members you live with been unable to get utilities (heat, electricity) when it was really needed?: No   Food Insecurity: Not on File (2024)    Received from [facility]    Food Insecurity      Food: 0   Transportation Needs: Low Risk  (9/15/2024)    Transportation Needs      Within the past 12 months, has lack of transportation kept you from medical appointments, getting your medicines, non-medical meetings or appointments, work, or from getting things that you need?: No   Physical Activity: Not on File (12/15/2023)    Received from [facility]    Physical Activity      Physical Activity: 0   Stress: Not on File (12/15/2023)    Received from [facility]    Stress      Stress: 0   Social Connections: Not on File (2024)    Received from [facility]    Social Connections      Connectedness: 0   Interpersonal Safety: Low Risk  (9/10/2024)    Interpersonal Safety      Do you feel physically and emotionally safe where you currently live?: Yes      Within the past 12 months, have you been hit, slapped, kicked or otherwise physically hurt by someone?: No      Within the past 12 months, have you been humiliated or emotionally abused in other ways by your partner or ex-partner?: No   Housing Stability: High Risk (9/15/2024)    Housing Stability      Do you have housing? : No      Are you worried about losing your housing?: No       MEDICATIONS:  Current Outpatient Medications   Medication Sig Dispense Refill     acetaminophen (TYLENOL) 500 MG tablet Take 1.5 tablets (750  mg) by mouth every 6 hours as needed for other (For optimal non-opioid multimodal pain management to improve pain control.).       albuterol (PROAIR HFA/PROVENTIL HFA/VENTOLIN HFA) 108 (90 Base) MCG/ACT inhaler Inhale 2 puffs into the lungs every 4 hours as needed       atorvastatin (LIPITOR) 40 MG tablet Take 40 mg by mouth at bedtime.       budesonide-formoterol (SYMBICORT) 80-4.5 MCG/ACT Inhaler Inhale 2 puffs into the lungs as needed       bumetanide (BUMEX) 1 MG tablet Take 1 mg by mouth as needed (swelling, SOB).       Calcium Carbonate-Vitamin D (OYSTER SHELL CALCIUM/D) 500-5 MG-MCG TABS Take 1 tablet by mouth daily       carvedilol (COREG) 3.125 MG tablet Take 2 tablets (6.25 mg) by mouth 2 times daily.       cetirizine (ZYRTEC) 10 MG tablet Take 10 mg by mouth daily       DOXYCYCLINE HYCLATE PO Take 100 mg by mouth every morning       empagliflozin (JARDIANCE) 10 MG TABS tablet Take 1 tablet (10 mg) by mouth daily. 30 tablet 1     EPINEPHrine (ANY BX GENERIC EQUIV) 0.3 MG/0.3ML injection 2-pack Inject 0.3 mg into the muscle as needed for anaphylaxis       eplerenone (INSPRA) 25 MG tablet Take 25 mg by mouth every morning.       methocarbamol (ROBAXIN) 750 MG tablet Take 1 tablet (750 mg) by mouth every 6 hours as needed for muscle spasms or other (pain). 90 tablet 0     oxyCODONE (ROXICODONE) 5 MG tablet Take 1 tablet (5 mg) by mouth every 6 hours as needed for severe pain. 15 tablet 0     OZEMPIC, 2 MG/DOSE, 8 MG/3ML pen Inject 2 mg subcutaneously every 7 days. Fridays  Last dose: 8/31       polyethylene glycol (MIRALAX) 17 GM/Dose powder Take 17 g by mouth daily as needed for constipation. 510 g 0     sacubitril-valsartan (ENTRESTO) 24-26 MG per tablet Take 1 tablet by mouth 2 times daily. 60 tablet 3     senna-docusate (SENOKOT-S/PERICOLACE) 8.6-50 MG tablet Take 2 tablets by mouth or Feeding Tube 2 times daily as needed for constipation. 30 tablet 0     Vitamin D3 50 mcg (2000 units) tablet Take 1  tablet by mouth daily.       No current facility-administered medications for this visit.        ALLERGIES:     Allergies   Allergen Reactions     Bee Venom Anaphylaxis     Perflutren Lipid Microspheres Other (See Comments)     Severe lower back pain (Definity)    Other Reaction(s): Other (see comments)      Severe lower back pain (Definity)       PHYSICAL EXAM:  /75 (BP Location: Right arm, Patient Position: Chair, Cuff Size: Adult Regular)   Pulse 89   Wt 107.2 kg (236 lb 4.8 oz)   SpO2 97%   BMI 30.34 kg/m    Gen: alert, interactive, NAD  Neck: supple, no significant JVD  CV: RRR, no m/r/g  Chest: CTAB, no wheezes or crackles  Ext: no LE edema    LABS:    CMP  Last Comprehensive Metabolic Panel:  Sodium   Date Value Ref Range Status   11/18/2024 139 135 - 145 mmol/L Final     Potassium   Date Value Ref Range Status   11/18/2024 4.7 3.4 - 5.3 mmol/L Final     Potassium POCT   Date Value Ref Range Status   09/10/2024 4.9 3.4 - 5.3 mmol/L Final     Chloride   Date Value Ref Range Status   11/18/2024 106 98 - 107 mmol/L Final     Carbon Dioxide (CO2)   Date Value Ref Range Status   11/18/2024 26 22 - 29 mmol/L Final     Anion Gap   Date Value Ref Range Status   11/18/2024 7 7 - 15 mmol/L Final     Glucose   Date Value Ref Range Status   11/18/2024 139 (H) 70 - 99 mg/dL Final     GLUCOSE BY METER POCT   Date Value Ref Range Status   10/09/2024 116 (H) 70 - 99 mg/dL Final     Urea Nitrogen   Date Value Ref Range Status   11/18/2024 15.8 6.0 - 20.0 mg/dL Final     Creatinine   Date Value Ref Range Status   11/18/2024 1.13 0.67 - 1.17 mg/dL Final     GFR Estimate   Date Value Ref Range Status   11/18/2024 77 >60 mL/min/1.73m2 Final     Comment:     eGFR calculated using 2021 CKD-EPI equation.     Calcium   Date Value Ref Range Status   11/18/2024 8.7 (L) 8.8 - 10.4 mg/dL Final     Comment:     Reference intervals for this test were updated on 7/16/2024 to reflect our healthy population more accurately. There  "may be differences in the flagging of prior results with similar values performed with this method. Those prior results can be interpreted in the context of the updated reference intervals.     Bilirubin Total   Date Value Ref Range Status   09/15/2024 0.6 <=1.2 mg/dL Final     Alkaline Phosphatase   Date Value Ref Range Status   09/15/2024 107 40 - 150 U/L Final     ALT   Date Value Ref Range Status   09/15/2024 6 0 - 70 U/L Final     AST   Date Value Ref Range Status   09/15/2024 23 0 - 45 U/L Final       NT-proBNP       TROP  No results found for: \"TROPI\", \"TROPONIN\", \"TROPR\", \"TROPN\"    CBC  CBC RESULTS:   Recent Labs   Lab Test 24  0619   WBC 4.4   RBC 2.92*   HGB 9.2*   HCT 27.6*   MCV 95   MCH 31.5   MCHC 33.3   RDW 13.7          LIPIDS  No results for input(s): \"CHOL\", \"HDL\", \"LDL\", \"TRIG\", \"CHOLHDLRATIO\" in the last 42005 hours.    TSH  No results found for: \"TSH\"    HBA1C  No results found for: \"A1C\"      CARDIAC DATA:    EK/10/2024: Heart rate 103 bpm, sinus tachycardia, left axis deviation, inferior infarct    Echo:  6/3/2024, [facility]   The estimated left ventricular ejection fraction is 21%.   Tricuspid regurgitation jet is not adequate for estimation of PA systolic   pressure.   Based on IVC geometry, the right atrial pressure is probably upper limit   of normal/mildly increased   Left ventricular enlargement .   Decreased left ventricular systolic performance, severe   Est Stroke volume 48 cc   Dilated cardiomyopathy .   Regional wall motion abnormality-inferolateral .     9/10/2024, [facility]  Moderately reduced LV systolic function w/ LVEF 30%. Normal RV systolic   function. Grade I DD. No RWMAs visualized. No AS or AI. Mild MR. No   pericardial effusion. No aortic dissection.     2024, [facility]  Left ventricular function is decreased. The ejection fraction is 35-40%  (moderately reduced).  Mid to apical anteroseptum is akinetic.  The right ventricle is normal size.  Global right " ventricular function is mildly reduced.  The inferior vena cava was normal in size with preserved respiratory  variability.  No significant valvular abnormalities present.  Previous study not available for comparison    Stress Test:  None available    Cardiac MRI:  3/15/2024, [facility]    1) Severely reduced left ventricular ejection fraction, calculated EF 14%   2) Dilated LV cavity with asynchronous septal motion consistent with left bundle-branch block.   3) Transmural late gadolinium hyperenhancement involving the apical inferior wall, and subendocardial pattern of hyperenhancement involving about 50% of the mid inferolateral wall. Apart from the apical inferior wall, there is preserved myocardial viability in all other distributions.   4) Small pericardial effusion and bilateral pleural effusions.     Cardiac Catheterization:  3/14/2024, [facility].    Right heart catheterization -     Mean RA 7 mmHg   RV 39/10 mmHg   PA 43/17 mmHg; mean 31 mm Hg   PCWP 22-23 mmHg     Cardiac output 3.5 L/min by thermodilution method (cardiac index 1.5   L/min/m^2)   Cardiac output 4.4 L/min by estimated Yaritza method (PA saturation 72 %,   Index 1.9 L/min/m^2)     Please find complete angiographic detail below   LVEDP following angiography is 24 mmHg     Coronary angiogram-  Diffuse three-vessel coronary artery disease with severe stenoses noted in   all 3 major coronary territories     ASSESSMENT/PLAN:  In summary, Scott Donato is here today with the following -      1.  HFrEF, chronic combined systolic and diastolic heart failure, ACC/AHA stage C, NYHA class III, LVEF 35 to 40%  2.  Ischemic cardiomyopathy  3.  Coronary artery disease, status post CABG September 2024  4.  Hypertension  5.  Mixed hyperlipidemia     Plans-  HFrEF, chronic combined systolic and diastolic heart failure:  Goal Directed Medical Therapies for Heart Failure:     These are indicated irrespective of the etiology of heart failure.  We discussed the primary  medications, their side effects, the care plan including frequent follow-ups  for optimization of therapies with monitoring of blood pressures, weights and lab results     Discussed checking echocardiogram and if EF still low, we can continue working on optimizing GDMT    Beta blocker: On carvedilol 6.25 mg twice a day  ACE/ARB/ARNI: Sacubitril/valsartan 24-26 mg BID   MRA: On eplerenone 25 mg daily  SGLT2 inhibitor: On empagliflozin 10 mg once a day    Diuretics: On Bumex 1 mg daily as needed, advised to take only half tab PRN instead of 1 tablet, since his BUN/creatinine are higher today with having taken bumex   Cardiac Rehab: was going to it but stopped it and planning to join a gym when foot allows  ICD/ CRT-D: Would not be indicated based on EF greater than 35%  Labs: At follow up  Follow up: Already scheduled for CORE clinic in January 2025, can continue CORE clinic follow-up every month or 2 for GDMT optimization and see me back in 6 months  CV Genetic testing: will defer based on ICM    Advised on daily home BP and weight monitoring  Advised on observing caution with salt intake    Coronary artery disease: Status post CABG 9/10/24, on aspirin 162 mg daily and atorvastatin 40 mg daily.    Screened for sleep apnea: he denies any concerns    I spent 42 minutes in care of the patient today including obtaining recent medical history, personally reviewing recent cardiac testing and/or lab results, today's examination, discussion of testing results and care recommendations with patient.       Thank you for the opportunity to participate in this patient's care. Please feel free to reach out with any questions or concerns.      Terrence Cazares MD, [facility]  Associate Professor of Medicine  Advanced Heart Failure, LVAD & Cardiac Transplant  Director, Cardio-Obstetrics  Cardio-Oncology  [facility]      Please do not hesitate to contact me if you have any questions/concerns.     Sincerely,     Terrence  MD [facility]

## 2024-12-16 NOTE — NURSING NOTE
Chief Complaint   Patient presents with    Follow Up     RETURN HEART FAILURE       Vitals were taken and medications reconciled.    Lan Tadeo, EMT  2:42 PM

## 2024-12-16 NOTE — PROGRESS NOTES
[facility]  Advanced Heart Failure Clinic    Patient Name: Scott Donato   : 1970   Date of Visit: 2024    Primary Care Physician: Fran Irwin MD     Referring Physician: TRACY Munoz  Reason for Visit: ischemic cardiomyopathy    Dear TRACY Munoz and Dr. Irwin,     I had the pleasure of seeing Scott Donato today in the [facility]. As you know Scott Donato is a pleasant 54 year old year old male, who presents today for further evaluation of ischemic cardiomyopathy.    Scott has a history of HFrEF 2/2 ICM (EF 35-40%, ernie 10%), CAD s/p CABG x4 (LIMA to LAD, SVG to D1, SVG to PDA, SVG to OM2, 9/10/24), DM2, and asthma     He presents to establish HF care.    Today he reports feeling well. He was attending cardiac rehab but planned to join a gym and exercise on his own. He has been back to work full time last 2 weeks. He is on his feet all the time and is wearing a brace on his left foot for [employer] but this has caused an ulcer and is following up with a podiatrist. This has limited his ability to exercise.    However with being on his feet all day, he denies chest pain, shortness of breath, PND/orthopnea, palpitations, lightheadedness, syncope. He has occasional leg swelling, and takes bumex as needed about once per week, took one dose today for his abdomen feeling full and his voice feels raspy which he believes happens when he is holding full. Compliant with medications and notes no problems taking them.      Home BPs - 100s-120s/ 70s-80s  Home HRs - not checking  Home weights - he believes his goal is ~229 lbs, and takes bumex for weight gain or swelling or abdominal bloating    ROS:  A complete 10-point ROS was negative except as above.    PAST MEDICAL HISTORY:  Past Medical History:   Diagnosis Date    Asthma     CAD (coronary artery disease)     Charcot foot due to diabetes mellitus (H)     Chronic kidney  disease     DM2 (diabetes mellitus, type 2) (H)     Dyspnea on exertion     Former smoker     Heart failure with reduced ejection fraction, NYHA class III (H)     Hyperlipidemia LDL goal <100     Orthopnea        PAST SURGICAL HISTORY:  Past Surgical History:   Procedure Laterality Date    Amputation of foot Right     APPENDECTOMY      BYPASS GRAFT ARTERY CORONARY N/A 9/10/2024    Procedure: median sternomy, coronary artery bypass graft X 4, Left internal mammary haverst, Left endoscopic saphenous emory havest, on cardiopulmonary bypass, Transesophageal Echocardiogram, Right femoral balloon pump and right fluroscopy.;  Surgeon: Evan Giles MD;  Location: [facility] OR    COLONOSCOPY      INSERT INTRAAORTIC BALLOON PUMP N/A 9/10/2024    Procedure: Insert intraaortic balloon pump;  Surgeon: Evan Giles MD;  Location: [facility] OR    IRRIGATION AND DEBRIDEMENT Left     foot       FAMILY HISTORY:  Family History   Problem Relation Age of Onset    Anesthesia Reaction Mother         Slow to wake    Coronary Artery Disease Maternal Grandfather     Coronary Artery Disease Paternal Grandmother     Coronary Artery Disease Paternal Grandfather     Lung Cancer Paternal Grandfather     Substance Abuse Son     Alcoholism Maternal Aunt     Arthritis Maternal Aunt     Alcoholism Maternal Uncle     Clotting Disorder No family hx of     Bleeding Disorder No family hx of        SOCIAL HISTORY:  Social History     Socioeconomic History    Marital status: Single     Spouse name: None    Number of children: None    Years of education: None    Highest education level: None   Tobacco Use    Smoking status: Former     Current packs/day: 0.00     Types: Cigarettes     Quit date:      Years since quittin.8    Smokeless tobacco: Never   Substance and Sexual Activity    Alcohol use: Yes     Comment: 2x a year    Drug use: Not Currently     Social Drivers of Health     Financial Resource Strain: Low Risk  (9/15/2024)     Financial Resource Strain     Within the past 12 months, have you or your family members you live with been unable to get utilities (heat, electricity) when it was really needed?: No   Food Insecurity: Not on File (9/26/2024)    Received from [employer]    Food Insecurity     Food: 0   Transportation Needs: Low Risk  (9/15/2024)    Transportation Needs     Within the past 12 months, has lack of transportation kept you from medical appointments, getting your medicines, non-medical meetings or appointments, work, or from getting things that you need?: No   Physical Activity: Not on File (12/15/2023)    Received from [employer]    Physical Activity     Physical Activity: 0   Stress: Not on File (12/15/2023)    Received from [employer]    Stress     Stress: 0   Social Connections: Not on File (9/13/2024)    Received from [employer]    Social Connections     Connectedness: 0   Interpersonal Safety: Low Risk  (9/10/2024)    Interpersonal Safety     Do you feel physically and emotionally safe where you currently live?: Yes     Within the past 12 months, have you been hit, slapped, kicked or otherwise physically hurt by someone?: No     Within the past 12 months, have you been humiliated or emotionally abused in other ways by your partner or ex-partner?: No   Housing Stability: High Risk (9/15/2024)    Housing Stability     Do you have housing? : No     Are you worried about losing your housing?: No       MEDICATIONS:  Current Outpatient Medications   Medication Sig Dispense Refill    acetaminophen (TYLENOL) 500 MG tablet Take 1.5 tablets (750 mg) by mouth every 6 hours as needed for other (For optimal non-opioid multimodal pain management to improve pain control.).      albuterol (PROAIR HFA/PROVENTIL HFA/VENTOLIN HFA) 108 (90 Base) MCG/ACT inhaler Inhale 2 puffs into the lungs every 4 hours as needed      atorvastatin (LIPITOR) 40 MG tablet Take 40 mg by mouth at bedtime.      budesonide-formoterol (SYMBICORT) 80-4.5 MCG/ACT Inhaler Inhale 2  puffs into the lungs as needed      bumetanide (BUMEX) 1 MG tablet Take 1 mg by mouth as needed (swelling, SOB).      Calcium Carbonate-Vitamin D (OYSTER SHELL CALCIUM/D) 500-5 MG-MCG TABS Take 1 tablet by mouth daily      carvedilol (COREG) 3.125 MG tablet Take 2 tablets (6.25 mg) by mouth 2 times daily.      cetirizine (ZYRTEC) 10 MG tablet Take 10 mg by mouth daily      DOXYCYCLINE HYCLATE PO Take 100 mg by mouth every morning      empagliflozin (JARDIANCE) 10 MG TABS tablet Take 1 tablet (10 mg) by mouth daily. 30 tablet 1    EPINEPHrine (ANY BX GENERIC EQUIV) 0.3 MG/0.3ML injection 2-pack Inject 0.3 mg into the muscle as needed for anaphylaxis      eplerenone (INSPRA) 25 MG tablet Take 25 mg by mouth every morning.      methocarbamol (ROBAXIN) 750 MG tablet Take 1 tablet (750 mg) by mouth every 6 hours as needed for muscle spasms or other (pain). 90 tablet 0    oxyCODONE (ROXICODONE) 5 MG tablet Take 1 tablet (5 mg) by mouth every 6 hours as needed for severe pain. 15 tablet 0    OZEMPIC, 2 MG/DOSE, 8 MG/3ML pen Inject 2 mg subcutaneously every 7 days. Fridays  Last dose: 8/31      polyethylene glycol (MIRALAX) 17 GM/Dose powder Take 17 g by mouth daily as needed for constipation. 510 g 0    sacubitril-valsartan (ENTRESTO) 24-26 MG per tablet Take 1 tablet by mouth 2 times daily. 60 tablet 3    senna-docusate (SENOKOT-S/PERICOLACE) 8.6-50 MG tablet Take 2 tablets by mouth or Feeding Tube 2 times daily as needed for constipation. 30 tablet 0    Vitamin D3 50 mcg (2000 units) tablet Take 1 tablet by mouth daily.       No current facility-administered medications for this visit.        ALLERGIES:     Allergies   Allergen Reactions    Bee Venom Anaphylaxis    Perflutren Lipid Microspheres Other (See Comments)     Severe lower back pain (Definity)    Other Reaction(s): Other (see comments)      Severe lower back pain (Definity)       PHYSICAL EXAM:  /75 (BP Location: Right arm, Patient Position: Chair, Cuff  Size: Adult Regular)   Pulse 89   Wt 107.2 kg (236 lb 4.8 oz)   SpO2 97%   BMI 30.34 kg/m    Gen: alert, interactive, NAD  Neck: supple, no significant JVD  CV: RRR, no m/r/g  Chest: CTAB, no wheezes or crackles  Ext: no LE edema    LABS:    CMP  Last Comprehensive Metabolic Panel:  Sodium   Date Value Ref Range Status   11/18/2024 139 135 - 145 mmol/L Final     Potassium   Date Value Ref Range Status   11/18/2024 4.7 3.4 - 5.3 mmol/L Final     Potassium POCT   Date Value Ref Range Status   09/10/2024 4.9 3.4 - 5.3 mmol/L Final     Chloride   Date Value Ref Range Status   11/18/2024 106 98 - 107 mmol/L Final     Carbon Dioxide (CO2)   Date Value Ref Range Status   11/18/2024 26 22 - 29 mmol/L Final     Anion Gap   Date Value Ref Range Status   11/18/2024 7 7 - 15 mmol/L Final     Glucose   Date Value Ref Range Status   11/18/2024 139 (H) 70 - 99 mg/dL Final     GLUCOSE BY METER POCT   Date Value Ref Range Status   10/09/2024 116 (H) 70 - 99 mg/dL Final     Urea Nitrogen   Date Value Ref Range Status   11/18/2024 15.8 6.0 - 20.0 mg/dL Final     Creatinine   Date Value Ref Range Status   11/18/2024 1.13 0.67 - 1.17 mg/dL Final     GFR Estimate   Date Value Ref Range Status   11/18/2024 77 >60 mL/min/1.73m2 Final     Comment:     eGFR calculated using 2021 CKD-EPI equation.     Calcium   Date Value Ref Range Status   11/18/2024 8.7 (L) 8.8 - 10.4 mg/dL Final     Comment:     Reference intervals for this test were updated on 7/16/2024 to reflect our healthy population more accurately. There may be differences in the flagging of prior results with similar values performed with this method. Those prior results can be interpreted in the context of the updated reference intervals.     Bilirubin Total   Date Value Ref Range Status   09/15/2024 0.6 <=1.2 mg/dL Final     Alkaline Phosphatase   Date Value Ref Range Status   09/15/2024 107 40 - 150 U/L Final     ALT   Date Value Ref Range Status   09/15/2024 6 0 - 70 U/L  "Final     AST   Date Value Ref Range Status   09/15/2024 23 0 - 45 U/L Final       NT-proBNP       TROP  No results found for: \"TROPI\", \"TROPONIN\", \"TROPR\", \"TROPN\"    CBC  CBC RESULTS:   Recent Labs   Lab Test 24  0619   WBC 4.4   RBC 2.92*   HGB 9.2*   HCT 27.6*   MCV 95   MCH 31.5   MCHC 33.3   RDW 13.7          LIPIDS  No results for input(s): \"CHOL\", \"HDL\", \"LDL\", \"TRIG\", \"CHOLHDLRATIO\" in the last 45888 hours.    TSH  No results found for: \"TSH\"    HBA1C  No results found for: \"A1C\"      CARDIAC DATA:    EK/10/2024: Heart rate 103 bpm, sinus tachycardia, left axis deviation, inferior infarct    [place] [facility] [place]   The estimated left ventricular ejection fraction is 21%.   Tricuspid regurgitation jet is not adequate for estimation of PA systolic   pressure.   Based on IVC geometry, the right atrial pressure is probably upper limit   of normal/mildly increased   Left ventricular enlargement .   Decreased left ventricular systolic performance, severe   Est Stroke volume 48 cc   Dilated cardiomyopathy .   Regional wall motion abnormality-inferolateral .     9/10/2024, [facility]  Moderately reduced LV systolic function w/ LVEF 30%. Normal RV systolic   function. Grade I DD. No RWMAs visualized. No AS or AI. Mild MR. No   pericardial effusion. No aortic dissection.     2024, [facility]  Left ventricular function is decreased. The ejection fraction is 35-40%  (moderately reduced).  Mid to apical anteroseptum is akinetic.  The right ventricle is normal size.  Global right ventricular function is mildly reduced.  The inferior vena cava was normal in size with preserved respiratory  variability.  No significant valvular abnormalities present.  Previous study not available for comparison    Stress Test:  None available    Cardiac [place]  [place] [facility] [place]    1) Severely reduced left ventricular ejection fraction, calculated EF 14%   2) Dilated LV cavity with asynchronous septal motion consistent with left " bundle-branch block.   3) Transmural late gadolinium hyperenhancement involving the apical inferior wall, and subendocardial pattern of hyperenhancement involving about 50% of the mid inferolateral wall. Apart from the apical inferior wall, there is preserved myocardial viability in all other distributions.   4) Small pericardial effusion and bilateral pleural effusions.     Cardiac Catheterization:  3/14/2024, [facility] [place]    Right heart catheterization -     Mean RA 7 mmHg   RV 39/10 mmHg   PA 43/17 mmHg; mean 31 mm Hg   PCWP 22-23 mmHg     Cardiac output 3.5 L/min by thermodilution method (cardiac index 1.5   L/min/m^2)   Cardiac output 4.4 L/min by estimated Yaritza method (PA saturation 72 %,   Index 1.9 L/min/m^2)     Please find complete angiographic detail below   LVEDP following angiography is 24 mmHg     Coronary angiogram-  Diffuse three-vessel coronary artery disease with severe stenoses noted in   all 3 major coronary territories     ASSESSMENT/PLAN:  In summary, Scott Donato is here today with the following -      1.  HFrEF, chronic combined systolic and diastolic heart failure, ACC/AHA stage C, NYHA class III, LVEF 35 to 40%  2.  Ischemic cardiomyopathy  3.  Coronary artery disease, status post CABG September 2024  4.  Hypertension  5.  Mixed hyperlipidemia     Plans-  HFrEF, chronic combined systolic and diastolic heart failure:  Goal Directed Medical Therapies for Heart Failure:     These are indicated irrespective of the etiology of heart failure.  We discussed the primary medications, their side effects, the care plan including frequent follow-ups  for optimization of therapies with monitoring of blood pressures, weights and lab results     Discussed checking echocardiogram and if EF still low, we can continue working on optimizing GDMT    Beta blocker: On carvedilol 6.25 mg twice a day  ACE/ARB/ARNI: Sacubitril/valsartan 24-26 mg BID   MRA: On eplerenone 25 mg daily  SGLT2 inhibitor: On empagliflozin 10  mg once a day    Diuretics: On Bumex 1 mg daily as needed, advised to take only half tab PRN instead of 1 tablet, since his BUN/creatinine are higher today with having taken bumex   Cardiac Rehab: was going to it but stopped it and planning to join a gym when foot allows  ICD/ CRT-D: Would not be indicated based on EF greater than 35%  Labs: At follow up  Follow up: Already scheduled for CORE clinic in January 2025, can continue CORE clinic follow-up every month or 2 for GDMT optimization and see me back in 6 months  CV Genetic testing: will defer based on ICM    Advised on daily home BP and weight monitoring  Advised on observing caution with salt intake    Coronary artery disease: Status post CABG 9/10/24, on aspirin 162 mg daily and atorvastatin 40 mg daily.    Screened for sleep apnea: he denies any concerns    I spent 42 minutes in care of the patient today including obtaining recent medical history, personally reviewing recent cardiac testing and/or lab results, today's examination, discussion of testing results and care recommendations with patient.       Thank you for the opportunity to participate in this patient's care. Please feel free to reach out with any questions or concerns.      Terrence Cazares MD, FACC  Associate Professor of Medicine  Advanced Heart Failure, LVAD & Cardiac Transplant  Director, Cardio-Obstetrics  Cardio-Oncology  [facility]

## 2024-12-30 ENCOUNTER — TELEPHONE (OUTPATIENT)
Dept: CARDIOLOGY | Facility: CLINIC | Age: 54
End: 2024-12-30

## 2024-12-30 NOTE — TELEPHONE ENCOUNTER
Called Scott to review echo results and recommendations. He does not check his blood pressure at home but he does have the BP monitor.   Reviewed with him plan to check BP daily and if systolic is greater than 100, will increase his entresto.   We agreed he will check BP daily for the next 3-4 days, and I will call on Thursday or Friday to review numbers and increase Entresto if able.     He verbalized understanding.

## 2024-12-30 NOTE — TELEPHONE ENCOUNTER
----- Message from Valentine Aguilar sent at 12/30/2024 11:19 AM CST -----  EF slightly better, would see where he is blood pressure wise, and if systolics average greater than 100, increase entresto to 49-51mg BID with repeat BMP in 7-10 days

## 2024-12-31 ENCOUNTER — PRE VISIT (OUTPATIENT)
Dept: CARDIOLOGY | Facility: CLINIC | Age: 54
End: 2024-12-31
Payer: COMMERCIAL

## 2024-12-31 DIAGNOSIS — I50.42 CHRONIC COMBINED SYSTOLIC (CONGESTIVE) AND DIASTOLIC (CONGESTIVE) HEART FAILURE (H): Primary | ICD-10-CM

## 2025-01-13 ENCOUNTER — OFFICE VISIT (OUTPATIENT)
Dept: CARDIOLOGY | Facility: CLINIC | Age: 55
End: 2025-01-13
Attending: CASE MANAGER/CARE COORDINATOR
Payer: COMMERCIAL

## 2025-01-13 ENCOUNTER — LAB (OUTPATIENT)
Dept: LAB | Facility: CLINIC | Age: 55
End: 2025-01-13
Attending: CASE MANAGER/CARE COORDINATOR
Payer: COMMERCIAL

## 2025-01-13 VITALS
BODY MASS INDEX: 30.81 KG/M2 | WEIGHT: 240 LBS | SYSTOLIC BLOOD PRESSURE: 116 MMHG | OXYGEN SATURATION: 98 % | DIASTOLIC BLOOD PRESSURE: 83 MMHG | HEART RATE: 88 BPM

## 2025-01-13 DIAGNOSIS — Z95.1 S/P CABG X 4: ICD-10-CM

## 2025-01-13 DIAGNOSIS — I50.22 CHRONIC SYSTOLIC HEART FAILURE (H): ICD-10-CM

## 2025-01-13 DIAGNOSIS — I50.42 CHRONIC COMBINED SYSTOLIC (CONGESTIVE) AND DIASTOLIC (CONGESTIVE) HEART FAILURE (H): ICD-10-CM

## 2025-01-13 DIAGNOSIS — I50.22 CHRONIC SYSTOLIC HEART FAILURE (H): Primary | ICD-10-CM

## 2025-01-13 LAB
ANION GAP SERPL CALCULATED.3IONS-SCNC: 9 MMOL/L (ref 7–15)
BUN SERPL-MCNC: 20.4 MG/DL (ref 6–20)
CALCIUM SERPL-MCNC: 9.3 MG/DL (ref 8.8–10.4)
CHLORIDE SERPL-SCNC: 105 MMOL/L (ref 98–107)
CREAT SERPL-MCNC: 1.26 MG/DL (ref 0.67–1.17)
EGFRCR SERPLBLD CKD-EPI 2021: 67 ML/MIN/1.73M2
GLUCOSE SERPL-MCNC: 132 MG/DL (ref 70–99)
HCO3 SERPL-SCNC: 27 MMOL/L (ref 22–29)
NT-PROBNP SERPL-MCNC: 387 PG/ML (ref 0–900)
POTASSIUM SERPL-SCNC: 4.7 MMOL/L (ref 3.4–5.3)
SODIUM SERPL-SCNC: 141 MMOL/L (ref 135–145)

## 2025-01-13 PROCEDURE — 36415 COLL VENOUS BLD VENIPUNCTURE: CPT | Performed by: PATHOLOGY

## 2025-01-13 PROCEDURE — 83880 ASSAY OF NATRIURETIC PEPTIDE: CPT | Performed by: PATHOLOGY

## 2025-01-13 PROCEDURE — 80048 BASIC METABOLIC PNL TOTAL CA: CPT | Performed by: PATHOLOGY

## 2025-01-13 PROCEDURE — 99214 OFFICE O/P EST MOD 30 MIN: CPT | Performed by: CASE MANAGER/CARE COORDINATOR

## 2025-01-13 PROCEDURE — 99213 OFFICE O/P EST LOW 20 MIN: CPT | Performed by: CASE MANAGER/CARE COORDINATOR

## 2025-01-13 RX ORDER — ASPIRIN 81 MG/1
1 TABLET ORAL DAILY
COMMUNITY
Start: 2024-12-31

## 2025-01-13 ASSESSMENT — PAIN SCALES - GENERAL: PAINLEVEL_OUTOF10: NO PAIN (0)

## 2025-01-13 NOTE — PROGRESS NOTES
[facility] Cardiology - [facility]   Cardiology Clinic Note      HPI:   Mr. Scott Donato is a pleasant 54 year old male with medical history pertinent for HFrEF 2/2 ICM (EF 35-40%, ernie 10%), CAD s/p CABG x4 (LIMA to LAD, SVG to D1, SVG to PDA, SVG to OM2, 9/10/24), DM2, and asthma. He presents to cardiology clinic to establish care after surgery.    Scott underwent the above named surgery on 9/10/24 with Dr. Giles, post-op course was uneventful and he was discharged on POD6. Weight on day of discharge 231 lbs.  Patient has been attending cardiac rehab, where he was noted to be symptomatically hypotensive and we instructed him to hold his Entresto until clinic follow-up.     Today in clinic, he denies chest pain, palpitations, dizziness, syncope, or lower extremity edema. Since stopping Entresto, he has not had dizzy spells and has more energy. He is able to walk through the grocery store, rather than use a motorized wheelchair. Has been able to do elliptical and weights at cardiac rehab.   Home weights have been stable 229-232 lbs. BP at rehab still soft /50s. He eats 1-2 meals per day (is on Ozempic which has reduced appetite). He estimates that he drinks 1L of fluid/day.     Interval History 1/13/25  Scott was most recently seen by Dr. Cazares on 12/16/24. This past week, Entresto increased to 49-51mg BID for BP control and Buemx stopped. Today Scott feels more fatigued and foggy headed since the dose increase. Home BP has decreased, now consistently 110/70s. He also feels more bloated; yesterday's weight 235 lbs ->233.9 lbs today. He had a brief episode of dizziness with quick movement.     PAST MEDICAL HISTORY:  Past Medical History:   Diagnosis Date    Asthma     CAD (coronary artery disease)     Charcot foot due to diabetes mellitus (H)     Chronic kidney disease     DM2 (diabetes mellitus, type 2) (H)     Dyspnea on exertion     Former smoker     Heart failure with reduced ejection fraction, NYHA class III  (H)     Hyperlipidemia LDL goal <100     Orthopnea        FAMILY HISTORY:  Family History   Problem Relation Age of Onset    Anesthesia Reaction Mother         Slow to wake    Coronary Artery Disease Maternal Grandfather     Coronary Artery Disease Paternal Grandmother     Coronary Artery Disease Paternal Grandfather     Lung Cancer Paternal Grandfather     Substance Abuse Son     Alcoholism Maternal Aunt     Arthritis Maternal Aunt     Alcoholism Maternal Uncle     Clotting Disorder No family hx of     Bleeding Disorder No family hx of        SOCIAL HISTORY:  Social History     Socioeconomic History    Marital status: Single   Tobacco Use    Smoking status: Former     Current packs/day: 0.00     Types: Cigarettes     Quit date:      Years since quittin.7    Smokeless tobacco: Never   Substance and Sexual Activity    Alcohol use: Yes     Comment: 2x a year    Drug use: Not Currently     Social Determinants of Health     Financial Resource Strain: Low Risk  (9/15/2024)    Financial Resource Strain     Within the past 12 months, have you or your family members you live with been unable to get utilities (heat, electricity) when it was really needed?: No   Food Insecurity: Low Risk  (9/15/2024)    Food Insecurity     Within the past 12 months, did you worry that your food would run out before you got money to buy more?: No     Within the past 12 months, did the food you bought just not last and you didn t have money to get more?: No   Transportation Needs: Low Risk  (9/15/2024)    Transportation Needs     Within the past 12 months, has lack of transportation kept you from medical appointments, getting your medicines, non-medical meetings or appointments, work, or from getting things that you need?: No   Physical Activity: Not on File (12/15/2023)    Received from [employer]    Physical Activity     Physical Activity: 0   Stress: Not on File (12/15/2023)    Received from [employer]    Stress     Stress: 0   Social  Connections: Not on File (9/13/2024)    Received from [employer]    Social [employer]     Connectedness: 0   Interpersonal Safety: Low Risk  (9/10/2024)    Interpersonal Safety     Do you feel physically and emotionally safe where you currently live?: Yes     Within the past 12 months, have you been hit, slapped, kicked or otherwise physically hurt by someone?: No     Within the past 12 months, have you been humiliated or emotionally abused in other ways by your partner or ex-partner?: No   Housing Stability: High Risk (9/15/2024)    Housing Stability     Do you have housing? : No     Are you worried about losing your housing?: No       CURRENT MEDICATIONS:  Current Outpatient Medications   Medication Sig Dispense Refill    acetaminophen (TYLENOL) 500 MG tablet Take 1.5 tablets (750 mg) by mouth every 6 hours as needed for other (For optimal non-opioid multimodal pain management to improve pain control.).      albuterol (PROAIR HFA/PROVENTIL HFA/VENTOLIN HFA) 108 (90 Base) MCG/ACT inhaler Inhale 2 puffs into the lungs every 4 hours as needed      aspirin 81 MG EC tablet Take 1 tablet by mouth daily.      atorvastatin (LIPITOR) 40 MG tablet Take 40 mg by mouth at bedtime.      budesonide-formoterol (SYMBICORT) 80-4.5 MCG/ACT Inhaler Inhale 2 puffs into the lungs as needed      Calcium Carbonate-Vitamin D (OYSTER SHELL CALCIUM/D) 500-5 MG-MCG TABS Take 1 tablet by mouth daily      carvedilol (COREG) 3.125 MG tablet Take 2 tablets (6.25 mg) by mouth 2 times daily.      cetirizine (ZYRTEC) 10 MG tablet Take 10 mg by mouth daily      DOXYCYCLINE HYCLATE PO Take 100 mg by mouth every morning      empagliflozin (JARDIANCE) 10 MG TABS tablet Take 1 tablet (10 mg) by mouth daily. 30 tablet 1    EPINEPHrine (ANY BX GENERIC EQUIV) 0.3 MG/0.3ML injection 2-pack Inject 0.3 mg into the muscle as needed for anaphylaxis      eplerenone (INSPRA) 25 MG tablet Take 25 mg by mouth every morning.      methocarbamol (ROBAXIN) 750 MG tablet  Take 1 tablet (750 mg) by mouth every 6 hours as needed for muscle spasms or other (pain). 90 tablet 0    oxyCODONE (ROXICODONE) 5 MG tablet Take 1 tablet (5 mg) by mouth every 6 hours as needed for severe pain. 15 tablet 0    OZEMPIC, 2 MG/DOSE, 8 MG/3ML pen Inject 2 mg subcutaneously every 7 days. Fridays  Last dose: 8/31      polyethylene glycol (MIRALAX) 17 GM/Dose powder Take 17 g by mouth daily as needed for constipation. 510 g 0    sacubitril-valsartan (ENTRESTO) 49-51 MG per tablet Take 1 tablet by mouth 2 times daily. 60 tablet 3    senna-docusate (SENOKOT-S/PERICOLACE) 8.6-50 MG tablet Take 2 tablets by mouth or Feeding Tube 2 times daily as needed for constipation. 30 tablet 0    Vitamin D3 50 mcg (2000 units) tablet Take 1 tablet by mouth daily.      bumetanide (BUMEX) 1 MG tablet Take 1 mg by mouth as needed (swelling, SOB). (Patient not taking: Reported on 1/13/2025)       No current facility-administered medications for this visit.       ROS:   Refer to HPI    EXAM:  /83 (BP Location: Right arm, Patient Position: Chair, Cuff Size: Adult Large)   Pulse 88   Wt 108.9 kg (240 lb)   SpO2 98%   BMI 30.81 kg/m    GENERAL: Appears comfortable, fatigued, in no acute distress.   HEENT: Eye symmetrical, no discharge or icterus bilaterally. Mucous membranes moist and without lesions.  CV: RRR, +S1S2, no murmur, rub, or gallop. JVD below midclavicular line at 45 degrees   RESPIRATORY: Respirations regular, even, and unlabored. Lungs CTA throughout.   GI: Soft and non distended with normoactive bowel sounds present in all quadrants. No tenderness, rebound, guarding.   EXTREMITIES: no peripheral edema. 2+ bilateral pedal pulses.   NEUROLOGIC: Alert and oriented x 3. No focal deficits.   MUSCULOSKELETAL: No joint swelling or tenderness.   SKIN: No jaundice. No rashes or lesions.     Labs, reviewed with patient in clinic today:  CBC RESULTS:  Lab Results   Component Value Date    WBC 4.4 09/16/2024    RBC  "2.92 (L) 2024    HGB 9.2 (L) 2024    HCT 27.6 (L) 2024    MCV 95 2024    MCH 31.5 2024    MCHC 33.3 2024    RDW 13.7 2024     2024       CMP RESULTS:  Lab Results   Component Value Date     2024    POTASSIUM 4.5 2024    POTASSIUM 4.9 09/10/2024    CHLORIDE 103 2024    CO2 28 2024    ANIONGAP 11 2024     (H) 2024     (H) 10/09/2024    BUN 22.5 (H) 2024    CR 1.37 (H) 2024    GFRESTIMATED 61 2024    ANITA 9.6 2024    BILITOTAL 0.6 09/15/2024    ALBUMIN 3.0 (L) 09/15/2024    ALKPHOS 107 09/15/2024    ALT 6 09/15/2024    AST 23 09/15/2024        INR RESULTS:  Lab Results   Component Value Date    INR 1.19 (H) 09/10/2024       Lab Results   Component Value Date    MAG 2.1 2024     No results found for: \"NTBNPI\"  Lab Results   Component Value Date    NTBNP 464 2024       LIPIDS:  No results for input(s): \"CHOL\", \"HDL\", \"LDL\", \"TRIG\", \"CHOLHDLRATIO\" in the last 34613 hours.    Echocardiogram:  Recent Results (from the past 4320 hour(s))   Echo Complete   Result Value    LVEF  35-40% (moderately reduced)    Narrative    [id number]  [id number]  [id number]  [id number]^YARITZA^FREDDIE     [facility],[place]  Echocardiography Laboratory  [address] [place] [id number]     Name: YAEL MEAD  MRN: [id number]  : 1970  Study Date: 2024 09:39 AM  Age: 54 yrs  Gender: Male  Patient Location: [facility]  Reason For Study: Heart Failure  Ordering Physician: FREDDIE VIGIL  Performed By: aJnet Severino RDCS     BSA: 2.4 m2  Height: 74 in  Weight: 243 lb  BP: 114/79 mmHg  ______________________________________________________________________________  Procedure  Echocardiogram with two-dimensional, color and spectral Doppler performed.  Contrast Optison. Optison (NDC #3574-1292-55) given intravenously. Patient was  given 5 ml mixture of 3 ml Optison and " 6 ml saline. 4 ml wasted.  ______________________________________________________________________________  Interpretation Summary  Left ventricular function is decreased. The ejection fraction is 35-40%  (moderately reduced).  Mid to apical anteroseptum is akinetic.  The right ventricle is normal size.  Global right ventricular function is mildly reduced.  The inferior vena cava was normal in size with preserved respiratory  variability.  No significant valvular abnormalities present.  Previous study not available for comparison.  ______________________________________________________________________________  Left Ventricle  Left ventricular wall thickness cannot evaluate. Mild left ventricular  dilation is present. Left ventricular function is decreased. The ejection  fraction is 35-40% (moderately reduced). Left ventricular diastolic function  is not assessable. Mild to moderate diffuse hypokinesis is present. Mid to  apical anteroseptum is akinetic. Inferolateral (posterior) wall hypokinesis is  present.     Right Ventricle  The right ventricle is normal size. Global right ventricular function is  mildly reduced. A right heart catheter is noted in the right ventricle.     Atria  The atria cannot be assessed.     Mitral Valve  The mitral valve is normal.     Aortic Valve  The valve leaflets are not well visualized. On Doppler interrogation, there is  no significant stenosis or regurgitation.     Tricuspid Valve  The tricuspid valve is normal.     Pulmonic Valve  The pulmonic valve is normal.     Vessels  The inferior vena cava was normal in size with preserved respiratory  variability. The aorta root is normal.     Pericardium  No pericardial effusion is present.     Miscellaneous  No significant valvular abnormalities present.     Compared to Previous Study  Previous study not available for comparison.  ______________________________________________________________________________  MMode/2D Measurements &  Calculations  IVSd: 1.1 cm  LVIDd: 5.7 cm  LVIDs: 4.8 cm  LVPWd: 0.97 cm  FS: 16.4 %  LV mass(C)d: 230.8 grams  LV mass(C)dI: 97.8 grams/m2  LVOT diam: 2.3 cm  LVOT area: 4.3 cm2  RWT: 0.34     ______________________________________________________________________________  Report approved by: Crystal WORTHINGTON 09/14/2024 11:04 AM             Assessment and Plan:   Mr. Donato is a 54 year old male with a PMH of HFrEF 2/2 ICM (EF 35-40%), CAD s/p CABG x4 (LIMA to LAD, SVG to D1, SVG to PDA, SVG to OM2, 9/10/24), DM2, and asthma.    More fagigued since increasing Entresto to 49-51. We discussed that this may be his body adjusting as it has been <1 week since starting higher dose. Discussed decreasing dose vs maintaining at current dose and monitoring symptoms, Scott would prefer to continue 49-51mg BID and monitor. Though he feels slightly bloated, I am hesitant to resume any diuretics given his mildly elevated creatinine.   I will message his cardiologist Dr. Cazares for an update. Plan to continue current medications and RN call later this week for symptom & BP check.     # Chronic systolic heart failure 2/2 ICM (LVEF 35-40%, ernie 10%)  Stage C NYHA Class II  Fluid status: euvolemic - Bumex discontinued last week d/t symptomatic hypotension & SWATI while on ARNi  ACEi/ARB  Entresto 49-51mg BID  BB: carvedilol 6.25mg BID  SGLT2i: Jardiance 10mg daily   Aldosterone antagonist: eplerenone 25mg daily  SCD prophylaxis decision deferred during medication uptitration  % BiV pacing: N/A  NSAIDS: contraindicated, discussed with patient   - add on Nt-pro BNP lab    # Coronary artery disease s/p CABG x4 (LIMA to LAD, SVG to D1, SVG to PDA, SVG to OM2, 9/10/24)  - aspirin 162mg daily  - continue atorvastatin 40mg daily    # SWATI  Baseline creatinine 1.0-1.2, today 1.5  - stop bumex BID, keep as PRN for swelling, weight gain or SOB    Follow up:  [facility] in 3 months; June with Dr. Cazares   Chart review time today: 10 minutes  Visit time  today: 25 minutes  Total time spent today: 35 minutes        Irene Larry CNP  [facility]   1/13/25

## 2025-01-13 NOTE — NURSING NOTE
Continue Entresto 49-51 twice daily; we will call you at the end of the week to see how you are feeling. Please continue to check your BP's at home about 90 minutes after taking AM meds.      Return Appointment:  -Follow-up with [facility] in 3 months with labs prior.   -Dr. Cazares 6/23/25 @ 7:45 am    Patient given instructions regarding scheduling next clinic visit. Patient demonstrated understanding of this information and agreed to call with further questions or concerns.  Patient stated he understood all health information given and agreed to call with further questions or concerns.    Jovita Herrera RN

## 2025-01-13 NOTE — PATIENT INSTRUCTIONS
Take your medicines every day, as directed    Changes made today:  Continue Entresto 49-51 twice daily; we will call you at the end of the week to see how you are feeling. Please continue to check your BP's at home about 90 minutes after taking AM meds.     Monitor Your Weight and Symptoms    Contact us if you:    Gain 2 pounds in one day or 5 pounds in one week  Feel more short of breath  Notice more leg swelling  Feel lightheadeded   Change your lifestyle    Limit Salt or Sodium:  2000 mg  Limit Fluids:  2000 mL or approximately 64 ounces  Eat a Heart Healthy Diet  Low in saturated fats  Stay Active:  Aim to move at least 150 minutes every  week         To Contact us    During Business Hours:  [phone], option # 1      After hours, weekends or holidays:   [phone], Option #4  Ask to speak to the On-Call Cardiologist. Inform them you are a [facility]/heart failure patient at the [facility].     [employer] allows you to communicate directly with your heart team through secure messaging.  [employer] can be accessed any time on your phone, computer, or tablet.  If you need assistance, we'd be happy to help!         Keep your Heart Appointments:    -Follow-up with [facility] in 3 months with labs prior.   -Dr. Cazares 6/23/25 @ 7:45 am

## 2025-01-13 NOTE — LETTER
1/13/2025      RE: Scott Donato  [address]  [address]  [place] [place] [address]       Dear Colleague,    Thank you for the opportunity to participate in the care of your patient, Scott Donato, at the [facility] [place] [facility]. Please see a copy of my visit note below.      [facility] - [facility]   Cardiology Clinic Note      HPI:   Mr. Scott Donato is a pleasant 54 year old male with medical history pertinent for HFrEF 2/2 ICM (EF 35-40%, ernie 10%), CAD s/p CABG x4 (LIMA to LAD, SVG to D1, SVG to PDA, SVG to OM2, 9/10/24), DM2, and asthma. He presents to cardiology clinic to establish care after surgery.    Scott underwent the above named surgery on 9/10/24 with Dr. Giles, post-op course was uneventful and he was discharged on POD6. Weight on day of discharge 231 lbs.  Patient has been attending cardiac rehab, where he was noted to be symptomatically hypotensive and we instructed him to hold his Entresto until clinic follow-up.     Today in clinic, he denies chest pain, palpitations, dizziness, syncope, or lower extremity edema. Since stopping Entresto, he has not had dizzy spells and has more energy. He is able to walk through the grocery store, rather than use a motorized wheelchair. Has been able to do elliptical and weights at cardiac rehab.   Home weights have been stable 229-232 lbs. BP at rehab still soft /50s. He eats 1-2 meals per day (is on Ozempic which has reduced appetite). He estimates that he drinks 1L of fluid/day.     Interval History 1/13/25  Scott was most recently seen by Dr. Cazares on 12/16/24. This past week, Entresto increased to 49-51mg BID for BP control and Buemx stopped. Today Scott feels more fatigued and foggy headed since the dose increase. Home BP has decreased, now consistently 110/70s. He also feels more bloated; yesterday's weight 235 lbs ->233.9 lbs today. He had a brief episode of dizziness  with quick movement.     PAST MEDICAL HISTORY:  Past Medical History:   Diagnosis Date     Asthma      CAD (coronary artery disease)      Charcot foot due to diabetes mellitus (H)      Chronic kidney disease      DM2 (diabetes mellitus, type 2) (H)      Dyspnea on exertion      Former smoker      Heart failure with reduced ejection fraction, NYHA class III (H)      Hyperlipidemia LDL goal <100      Orthopnea        FAMILY HISTORY:  Family History   Problem Relation Age of Onset     Anesthesia Reaction Mother         Slow to wake     Coronary Artery Disease Maternal Grandfather      Coronary Artery Disease Paternal Grandmother      Coronary Artery Disease Paternal Grandfather      Lung Cancer Paternal Grandfather      Substance Abuse Son      Alcoholism Maternal Aunt      Arthritis Maternal Aunt      Alcoholism Maternal Uncle      Clotting Disorder No family hx of      Bleeding Disorder No family hx of        SOCIAL HISTORY:  Social History     Socioeconomic History     Marital status: Single   Tobacco Use     Smoking status: Former     Current packs/day: 0.00     Types: Cigarettes     Quit date:      Years since quittin.7     Smokeless tobacco: Never   Substance and Sexual Activity     Alcohol use: Yes     Comment: 2x a year     Drug use: Not Currently     Social Determinants of Health     Financial Resource Strain: Low Risk  (9/15/2024)    Financial Resource Strain      Within the past 12 months, have you or your family members you live with been unable to get utilities (heat, electricity) when it was really needed?: No   Food Insecurity: Low Risk  (9/15/2024)    Food Insecurity      Within the past 12 months, did you worry that your food would run out before you got money to buy more?: No      Within the past 12 months, did the food you bought just not last and you didn t have money to get more?: No   Transportation Needs: Low Risk  (9/15/2024)    Transportation Needs      Within the past 12 months, has  lack of transportation kept you from medical appointments, getting your medicines, non-medical meetings or appointments, work, or from getting things that you need?: No   Physical Activity: Not on File (12/15/2023)    Received from [employer]    Physical Activity      Physical Activity: 0   Stress: Not on File (12/15/2023)    Received from [employer]    Stress      Stress: 0   Social Connections: Not on File (9/13/2024)    Received from [employer]    Social Connections      Connectedness: 0   Interpersonal Safety: Low Risk  (9/10/2024)    Interpersonal Safety      Do you feel physically and emotionally safe where you currently live?: Yes      Within the past 12 months, have you been hit, slapped, kicked or otherwise physically hurt by someone?: No      Within the past 12 months, have you been humiliated or emotionally abused in other ways by your partner or ex-partner?: No   Housing Stability: High Risk (9/15/2024)    Housing Stability      Do you have housing? : No      Are you worried about losing your housing?: No       CURRENT MEDICATIONS:  Current Outpatient Medications   Medication Sig Dispense Refill     acetaminophen (TYLENOL) 500 MG tablet Take 1.5 tablets (750 mg) by mouth every 6 hours as needed for other (For optimal non-opioid multimodal pain management to improve pain control.).       albuterol (PROAIR HFA/PROVENTIL HFA/VENTOLIN HFA) 108 (90 Base) MCG/ACT inhaler Inhale 2 puffs into the lungs every 4 hours as needed       aspirin 81 MG EC tablet Take 1 tablet by mouth daily.       atorvastatin (LIPITOR) 40 MG tablet Take 40 mg by mouth at bedtime.       budesonide-formoterol (SYMBICORT) 80-4.5 MCG/ACT Inhaler Inhale 2 puffs into the lungs as needed       Calcium Carbonate-Vitamin D (OYSTER SHELL CALCIUM/D) 500-5 MG-MCG TABS Take 1 tablet by mouth daily       carvedilol (COREG) 3.125 MG tablet Take 2 tablets (6.25 mg) by mouth 2 times daily.       cetirizine (ZYRTEC) 10 MG tablet Take 10 mg by mouth daily        DOXYCYCLINE HYCLATE PO Take 100 mg by mouth every morning       empagliflozin (JARDIANCE) 10 MG TABS tablet Take 1 tablet (10 mg) by mouth daily. 30 tablet 1     EPINEPHrine (ANY BX GENERIC EQUIV) 0.3 MG/0.3ML injection 2-pack Inject 0.3 mg into the muscle as needed for anaphylaxis       eplerenone (INSPRA) 25 MG tablet Take 25 mg by mouth every morning.       methocarbamol (ROBAXIN) 750 MG tablet Take 1 tablet (750 mg) by mouth every 6 hours as needed for muscle spasms or other (pain). 90 tablet 0     oxyCODONE (ROXICODONE) 5 MG tablet Take 1 tablet (5 mg) by mouth every 6 hours as needed for severe pain. 15 tablet 0     OZEMPIC, 2 MG/DOSE, 8 MG/3ML pen Inject 2 mg subcutaneously every 7 days. Fridays  Last dose: 8/31       polyethylene glycol (MIRALAX) 17 GM/Dose powder Take 17 g by mouth daily as needed for constipation. 510 g 0     sacubitril-valsartan (ENTRESTO) 49-51 MG per tablet Take 1 tablet by mouth 2 times daily. 60 tablet 3     senna-docusate (SENOKOT-S/PERICOLACE) 8.6-50 MG tablet Take 2 tablets by mouth or Feeding Tube 2 times daily as needed for constipation. 30 tablet 0     Vitamin D3 50 mcg (2000 units) tablet Take 1 tablet by mouth daily.       bumetanide (BUMEX) 1 MG tablet Take 1 mg by mouth as needed (swelling, SOB). (Patient not taking: Reported on 1/13/2025)       No current facility-administered medications for this visit.       ROS:   Refer to HPI    EXAM:  /83 (BP Location: Right arm, Patient Position: Chair, Cuff Size: Adult Large)   Pulse 88   Wt 108.9 kg (240 lb)   SpO2 98%   BMI 30.81 kg/m    GENERAL: Appears comfortable, fatigued, in no acute distress.   HEENT: Eye symmetrical, no discharge or icterus bilaterally. Mucous membranes moist and without lesions.  CV: RRR, +S1S2, no murmur, rub, or gallop. JVD below midclavicular line at 45 degrees   RESPIRATORY: Respirations regular, even, and unlabored. Lungs CTA throughout.   GI: Soft and non distended with normoactive bowel  "sounds present in all quadrants. No tenderness, rebound, guarding.   EXTREMITIES: no peripheral edema. 2+ bilateral pedal pulses.   NEUROLOGIC: Alert and oriented x 3. No focal deficits.   MUSCULOSKELETAL: No joint swelling or tenderness.   SKIN: No jaundice. No rashes or lesions.     Labs, reviewed with patient in clinic today:  CBC RESULTS:  Lab Results   Component Value Date    WBC 4.4 2024    RBC 2.92 (L) 2024    HGB 9.2 (L) 2024    HCT 27.6 (L) 2024    MCV 95 2024    MCH 31.5 2024    MCHC 33.3 2024    RDW 13.7 2024     2024       CMP RESULTS:  Lab Results   Component Value Date     2024    POTASSIUM 4.5 2024    POTASSIUM 4.9 09/10/2024    CHLORIDE 103 2024    CO2 28 2024    ANIONGAP 11 2024     (H) 2024     (H) 10/09/2024    BUN 22.5 (H) 2024    CR 1.37 (H) 2024    GFRESTIMATED 61 2024    ANITA 9.6 2024    BILITOTAL 0.6 09/15/2024    ALBUMIN 3.0 (L) 09/15/2024    ALKPHOS 107 09/15/2024    ALT 6 09/15/2024    AST 23 09/15/2024        INR RESULTS:  Lab Results   Component Value Date    INR 1.19 (H) 09/10/2024       Lab Results   Component Value Date    MAG 2.1 2024     No results found for: \"NTBNPI\"  Lab Results   Component Value Date    NTBNP 464 2024       LIPIDS:  No results for input(s): \"CHOL\", \"HDL\", \"LDL\", \"TRIG\", \"CHOLHDLRATIO\" in the last 45122 hours.    Echocardiogram:  Recent Results (from the past 4320 hour(s))   Echo Complete   Result Value    LVEF  35-40% (moderately reduced)    [facility]    [id number]  [id number]  [id number]^YARITZA^FREDDIE     [facility],[place]  Echocardiography Laboratory  [address] [place] [id number]     Name: YAEL MEAD  MRN: [id number]  : 1970  Study Date: 2024 09:39 AM  Age: 54 yrs  Gender: Male  Patient Location: [facility]  Reason For Study: Heart Failure  Ordering " Physician: FREDDIE VIGIL  Performed By: Janet Severino RDCS     BSA: 2.4 m2  Height: 74 in  Weight: 243 lb  BP: 114/79 mmHg  ______________________________________________________________________________  Procedure  Echocardiogram with two-dimensional, color and spectral Doppler performed.  Contrast Optison. Optison (NDC #2889-7024-83) given intravenously. Patient was  given 5 ml mixture of 3 ml Optison and 6 ml saline. 4 ml wasted.  ______________________________________________________________________________  Interpretation Summary  Left ventricular function is decreased. The ejection fraction is 35-40%  (moderately reduced).  Mid to apical anteroseptum is akinetic.  The right ventricle is normal size.  Global right ventricular function is mildly reduced.  The inferior vena cava was normal in size with preserved respiratory  variability.  No significant valvular abnormalities present.  Previous study not available for comparison.  ______________________________________________________________________________  Left Ventricle  Left ventricular wall thickness cannot evaluate. Mild left ventricular  dilation is present. Left ventricular function is decreased. The ejection  fraction is 35-40% (moderately reduced). Left ventricular diastolic function  is not assessable. Mild to moderate diffuse hypokinesis is present. Mid to  apical anteroseptum is akinetic. Inferolateral (posterior) wall hypokinesis is  present.     Right Ventricle  The right ventricle is normal size. Global right ventricular function is  mildly reduced. A right heart catheter is noted in the right ventricle.     Atria  The atria cannot be assessed.     Mitral Valve  The mitral valve is normal.     Aortic Valve  The valve leaflets are not well visualized. On Doppler interrogation, there is  no significant stenosis or regurgitation.     Tricuspid Valve  The tricuspid valve is normal.     Pulmonic Valve  The pulmonic valve is normal.     Vessels  The  inferior vena cava was normal in size with preserved respiratory  variability. The aorta root is normal.     Pericardium  No pericardial effusion is present.     Miscellaneous  No significant valvular abnormalities present.     Compared to Previous Study  Previous study not available for comparison.  ______________________________________________________________________________  MMode/2D Measurements & Calculations  IVSd: 1.1 cm  LVIDd: 5.7 cm  LVIDs: 4.8 cm  LVPWd: 0.97 cm  FS: 16.4 %  LV mass(C)d: 230.8 grams  LV mass(C)dI: 97.8 grams/m2  LVOT diam: 2.3 cm  LVOT area: 4.3 cm2  RWT: 0.34     ______________________________________________________________________________  Report approved by: rCystal WORTHINGTON 09/14/2024 11:04 AM             Assessment and Plan:   Mr. Donato is a 54 year old male with a PMH of HFrEF 2/2 ICM (EF 35-40%), CAD s/p CABG x4 (LIMA to LAD, SVG to D1, SVG to PDA, SVG to OM2, 9/10/24), DM2, and asthma.    More fagigued since increasing Entresto to 49-51. We discussed that this may be his body adjusting as it has been <1 week since starting higher dose. Discussed decreasing dose vs maintaining at current dose and monitoring symptoms, Scott would prefer to continue 49-51mg BID and monitor. Though he feels slightly bloated, I am hesitant to resume any diuretics given his mildly elevated creatinine.   I will message his cardiologist Dr. Cazares for an update. Plan to continue current medications and RN call later this week for symptom & BP check.     # Chronic systolic heart failure 2/2 ICM (LVEF 35-40%, ernie 10%)  Stage C NYHA Class II  Fluid status: euvolemic - Bumex discontinued last week d/t symptomatic hypotension & SWATI while on ARNi  ACEi/ARB  Entresto 49-51mg BID  BB: carvedilol 6.25mg BID  SGLT2i: Jardiance 10mg daily   Aldosterone antagonist: eplerenone 25mg daily  SCD prophylaxis decision deferred during medication uptitration  % BiV pacing: N/A  NSAIDS: contraindicated, discussed with  patient   - add on Nt-pro BNP lab    # Coronary artery disease s/p CABG x4 (LIMA to LAD, SVG to D1, SVG to PDA, SVG to OM2, 9/10/24)  - aspirin 162mg daily  - continue atorvastatin 40mg daily    # SWATI  Baseline creatinine 1.0-1.2, today 1.5  - stop bumex BID, keep as PRN for swelling, weight gain or SOB    Follow up:  [facility] in 3 months; June with Dr. Cazares   Chart review time today: 10 minutes  Visit time today: 25 minutes  Total time spent today: 35 minutes        Irene Larry CNP  [facility]   1/13/25            Please do not hesitate to contact me if you have any questions/concerns.     Sincerely,     TRACY DE LEON CNP

## 2025-01-17 ENCOUNTER — TELEPHONE (OUTPATIENT)
Dept: CARDIOLOGY | Facility: CLINIC | Age: 55
End: 2025-01-17
Payer: COMMERCIAL

## 2025-01-17 NOTE — TELEPHONE ENCOUNTER
----- Message from Jovita HICKS sent at 1/16/2025 10:18 AM CST -----    ----- Message -----  From: Jovita Herrera, RN  Sent: 1/16/2025  12:00 AM CST  To: Jovita Herrera, RN    For [facility] clinic- Call pt and see if he is still feeling sluggish and get home Bps.     Continue Entresto 49-51 twice daily; we will call you at the end of the week to see how you are feeling. Please continue to check your BP's at home about 90 minutes after taking AM meds.

## 2025-01-17 NOTE — TELEPHONE ENCOUNTER
Ross message sent to pt to see how he is feeling continuing Entresto 49-51 mg BID.     Jovita Herrera RN

## 2025-01-23 NOTE — TELEPHONE ENCOUNTER
Ross message read by pt.    Called pt and left VM to see how he is feeling continuing Entresto.     Jovita Herrera RN

## 2025-01-23 NOTE — TELEPHONE ENCOUNTER
"Pt called back; he states that he is feeling, \"okay, my fatigue is the same, no better no worse.\"     Pt's BP's are stable, 116-123/60's. Pt reports intermittent lightheadedness that lasts less than 3 seconds when he is changing positions. No other symptoms to report.     Reviewed with Irene Larry.     Date: 1/23/2025    Time of Call: 10:07 AM     Diagnosis:  HF      [ VORB ] Ordering provider: Irene Larry CNP   Order:   -Decrease Entresto to 24-26 mg BID x 1 week to see if fatigue improves.      Order received by: Jovita Herrera RN       Follow-up/additional notes: Called pt and relayed recommendation; pt states he does NOT want to decrease Entresto; he wants to continue on 49-51 mg dose and see how he feels. Writer will plan to check back in with pt in 2 weeks.                   "

## 2025-02-04 ENCOUNTER — APPOINTMENT (OUTPATIENT)
Dept: GENERAL RADIOLOGY | Facility: CLINIC | Age: 55
End: 2025-02-04
Attending: EMERGENCY MEDICINE
Payer: COMMERCIAL

## 2025-02-04 ENCOUNTER — HOSPITAL ENCOUNTER (EMERGENCY)
Facility: CLINIC | Age: 55
Discharge: HOME | End: 2025-02-04
Attending: EMERGENCY MEDICINE | Admitting: EMERGENCY MEDICINE
Payer: COMMERCIAL

## 2025-02-04 VITALS
SYSTOLIC BLOOD PRESSURE: 129 MMHG | WEIGHT: 237 LBS | DIASTOLIC BLOOD PRESSURE: 88 MMHG | OXYGEN SATURATION: 99 % | BODY MASS INDEX: 30.43 KG/M2 | RESPIRATION RATE: 16 BRPM | HEART RATE: 87 BPM | TEMPERATURE: 97.6 F

## 2025-02-04 DIAGNOSIS — R07.9 ACUTE CHEST PAIN: ICD-10-CM

## 2025-02-04 LAB
ALBUMIN SERPL BCG-MCNC: 4.8 G/DL (ref 3.5–5.2)
ALP SERPL-CCNC: 163 U/L (ref 40–150)
ALT SERPL W P-5'-P-CCNC: 24 U/L (ref 0–70)
ANION GAP SERPL CALCULATED.3IONS-SCNC: 13 MMOL/L (ref 7–15)
AST SERPL W P-5'-P-CCNC: 32 U/L (ref 0–45)
ATRIAL RATE - MUSE: 82 BPM
BASOPHILS # BLD AUTO: 0 10E3/UL (ref 0–0.2)
BASOPHILS NFR BLD AUTO: 0 %
BILIRUB SERPL-MCNC: 0.7 MG/DL
BUN SERPL-MCNC: 17.7 MG/DL (ref 6–20)
CALCIUM SERPL-MCNC: 9.9 MG/DL (ref 8.8–10.4)
CHLORIDE SERPL-SCNC: 103 MMOL/L (ref 98–107)
CREAT SERPL-MCNC: 1.09 MG/DL (ref 0.67–1.17)
D DIMER PPP FEU-MCNC: 0.5 UG/ML FEU (ref 0–0.5)
DIASTOLIC BLOOD PRESSURE - MUSE: NORMAL MMHG
EGFRCR SERPLBLD CKD-EPI 2021: 80 ML/MIN/1.73M2
EOSINOPHIL # BLD AUTO: 0.2 10E3/UL (ref 0–0.7)
EOSINOPHIL NFR BLD AUTO: 3 %
ERYTHROCYTE [DISTWIDTH] IN BLOOD BY AUTOMATED COUNT: 17.6 % (ref 10–15)
FLUAV RNA SPEC QL NAA+PROBE: NEGATIVE
FLUBV RNA RESP QL NAA+PROBE: NEGATIVE
GLUCOSE SERPL-MCNC: 100 MG/DL (ref 70–99)
HCO3 SERPL-SCNC: 24 MMOL/L (ref 22–29)
HCT VFR BLD AUTO: 53.3 % (ref 40–53)
HGB BLD-MCNC: 16.6 G/DL (ref 13.3–17.7)
IMM GRANULOCYTES # BLD: 0 10E3/UL
IMM GRANULOCYTES NFR BLD: 0 %
INTERPRETATION ECG - MUSE: NORMAL
LYMPHOCYTES # BLD AUTO: 1.5 10E3/UL (ref 0.8–5.3)
LYMPHOCYTES NFR BLD AUTO: 25 %
MCH RBC QN AUTO: 26.5 PG (ref 26.5–33)
MCHC RBC AUTO-ENTMCNC: 31.1 G/DL (ref 31.5–36.5)
MCV RBC AUTO: 85 FL (ref 78–100)
MONOCYTES # BLD AUTO: 0.4 10E3/UL (ref 0–1.3)
MONOCYTES NFR BLD AUTO: 6 %
NEUTROPHILS # BLD AUTO: 3.9 10E3/UL (ref 1.6–8.3)
NEUTROPHILS NFR BLD AUTO: 66 %
NRBC # BLD AUTO: 0 10E3/UL
NRBC BLD AUTO-RTO: 0 /100
NT-PROBNP SERPL-MCNC: 543 PG/ML (ref 0–900)
P AXIS - MUSE: 71 DEGREES
PLATELET # BLD AUTO: 184 10E3/UL (ref 150–450)
POTASSIUM SERPL-SCNC: 4.5 MMOL/L (ref 3.4–5.3)
PR INTERVAL - MUSE: 152 MS
PROT SERPL-MCNC: 8.8 G/DL (ref 6.4–8.3)
QRS DURATION - MUSE: 86 MS
QT - MUSE: 382 MS
QTC - MUSE: 446 MS
R AXIS - MUSE: 90 DEGREES
RBC # BLD AUTO: 6.26 10E6/UL (ref 4.4–5.9)
RSV RNA SPEC NAA+PROBE: NEGATIVE
SARS-COV-2 RNA RESP QL NAA+PROBE: NEGATIVE
SODIUM SERPL-SCNC: 140 MMOL/L (ref 135–145)
SYSTOLIC BLOOD PRESSURE - MUSE: NORMAL MMHG
T AXIS - MUSE: 18 DEGREES
TROPONIN T SERPL HS-MCNC: 13 NG/L
TROPONIN T SERPL HS-MCNC: 14 NG/L
VENTRICULAR RATE- MUSE: 82 BPM
WBC # BLD AUTO: 5.9 10E3/UL (ref 4–11)

## 2025-02-04 PROCEDURE — 85379 FIBRIN DEGRADATION QUANT: CPT | Performed by: EMERGENCY MEDICINE

## 2025-02-04 PROCEDURE — 83880 ASSAY OF NATRIURETIC PEPTIDE: CPT | Performed by: EMERGENCY MEDICINE

## 2025-02-04 PROCEDURE — 87637 SARSCOV2&INF A&B&RSV AMP PRB: CPT | Performed by: EMERGENCY MEDICINE

## 2025-02-04 PROCEDURE — 71046 X-RAY EXAM CHEST 2 VIEWS: CPT

## 2025-02-04 PROCEDURE — 71046 X-RAY EXAM CHEST 2 VIEWS: CPT | Mod: 26 | Performed by: RADIOLOGY

## 2025-02-04 PROCEDURE — 82947 ASSAY GLUCOSE BLOOD QUANT: CPT | Performed by: EMERGENCY MEDICINE

## 2025-02-04 PROCEDURE — 82435 ASSAY OF BLOOD CHLORIDE: CPT | Performed by: EMERGENCY MEDICINE

## 2025-02-04 PROCEDURE — 93010 ELECTROCARDIOGRAM REPORT: CPT | Performed by: EMERGENCY MEDICINE

## 2025-02-04 PROCEDURE — 93005 ELECTROCARDIOGRAM TRACING: CPT | Performed by: EMERGENCY MEDICINE

## 2025-02-04 PROCEDURE — 99285 EMERGENCY DEPT VISIT HI MDM: CPT | Mod: 25 | Performed by: EMERGENCY MEDICINE

## 2025-02-04 PROCEDURE — 84484 ASSAY OF TROPONIN QUANT: CPT | Performed by: EMERGENCY MEDICINE

## 2025-02-04 PROCEDURE — 85004 AUTOMATED DIFF WBC COUNT: CPT | Performed by: EMERGENCY MEDICINE

## 2025-02-04 PROCEDURE — 36415 COLL VENOUS BLD VENIPUNCTURE: CPT | Performed by: EMERGENCY MEDICINE

## 2025-02-04 PROCEDURE — 99284 EMERGENCY DEPT VISIT MOD MDM: CPT | Performed by: EMERGENCY MEDICINE

## 2025-02-04 PROCEDURE — 80053 COMPREHEN METABOLIC PANEL: CPT | Performed by: EMERGENCY MEDICINE

## 2025-02-04 ASSESSMENT — ACTIVITIES OF DAILY LIVING (ADL)
ADLS_ACUITY_SCORE: 56

## 2025-02-04 ASSESSMENT — COLUMBIA-SUICIDE SEVERITY RATING SCALE - C-SSRS
1. IN THE PAST MONTH, HAVE YOU WISHED YOU WERE DEAD OR WISHED YOU COULD GO TO SLEEP AND NOT WAKE UP?: NO
2. HAVE YOU ACTUALLY HAD ANY THOUGHTS OF KILLING YOURSELF IN THE PAST MONTH?: NO
6. HAVE YOU EVER DONE ANYTHING, STARTED TO DO ANYTHING, OR PREPARED TO DO ANYTHING TO END YOUR LIFE?: NO

## 2025-02-04 NOTE — ED TRIAGE NOTES
CP/nausea that started after taking bumex at 8am this morning  Cabg x4 in September     Triage Assessment (Adult)       Row Name 02/04/25 0957          Triage Assessment    Airway WDL WDL        Respiratory WDL    Respiratory WDL WDL        Skin Circulation/Temperature WDL    Skin Circulation/Temperature WDL WDL        Cardiac WDL    Cardiac WDL X;chest pain        Peripheral/Neurovascular WDL    Peripheral Neurovascular WDL WDL        Cognitive/Neuro/Behavioral WDL    Cognitive/Neuro/Behavioral WDL WDL

## 2025-02-04 NOTE — ED PROVIDER NOTES
History     Chief Complaint   Patient presents with    Chest Pain    Nausea     HPI  Scott Donato is a 54 year old male with a past medical history of HFrEF (EF:40-45% on 12/30/2024), CAD, type II DM, HLD, CKD  who presents to the emergency department with a chief complaint of chest pain.  This started this morning.  Chest pain is located in the bilateral upper chest and there is a spot of sharp pain located just to the right of his sternum.  Associated with nausea.  Patient also notes that he is up about 8 pounds from his dry weight.  He feels that his legs are swollen and he feels very bloated.  Due to this, he did take a Bumex today.  He does not take this daily.  He has had good urine output since then.  He feels his voice has been affected as well.  He is also noticed some rhinorrhea.  No coughing.    I have reviewed the Medications, Allergies, Past Medical and Surgical History, and Social History in the Epic system.    ECHO 12/30/24  Interpretation Summary  Mild left ventricular dilation is present.LVIDd 59mm.  Left ventricular function is decreased. The ejection fraction is 40-45%  (mildly reduced).  Right ventricular function, chamber size, wall motion, and thickness are  normal.  Sinuses of Valsalva 4.3 cm.  Ascending Aorta dilatation is present.  The inferior vena cava is normal.  No pericardial effusion is present.    Past Medical History:   Diagnosis Date    Asthma     CAD (coronary artery disease)     Charcot foot due to diabetes mellitus (H)     Chronic kidney disease     DM2 (diabetes mellitus, type 2) (H)     Dyspnea on exertion     Former smoker     Heart failure with reduced ejection fraction, NYHA class III (H)     Hyperlipidemia LDL goal <100     Orthopnea      Past Surgical History:   Procedure Laterality Date    Amputation of foot Right 2023    APPENDECTOMY      BYPASS GRAFT ARTERY CORONARY N/A 9/10/2024    Procedure: median sternomy, coronary artery bypass graft X 4, Left internal mammary  haverst, Left endoscopic saphenous emory havest, on cardiopulmonary bypass, Transesophageal Echocardiogram, Right femoral balloon pump and right fluroscopy.;  Surgeon: Evan Giles MD;  Location: [facility] OR    COLONOSCOPY      INSERT INTRAAORTIC BALLOON PUMP N/A 9/10/2024    Procedure: Insert intraaortic balloon pump;  Surgeon: Evan Giles MD;  Location: [facility] OR    IRRIGATION AND DEBRIDEMENT Left     foot     No current facility-administered medications for this encounter.     Current Outpatient Medications   Medication Sig Dispense Refill    acetaminophen (TYLENOL) 500 MG tablet Take 1.5 tablets (750 mg) by mouth every 6 hours as needed for other (For optimal non-opioid multimodal pain management to improve pain control.).      albuterol (PROAIR HFA/PROVENTIL HFA/VENTOLIN HFA) 108 (90 Base) MCG/ACT inhaler Inhale 2 puffs into the lungs every 4 hours as needed      aspirin 81 MG EC tablet Take 1 tablet by mouth daily.      atorvastatin (LIPITOR) 40 MG tablet Take 40 mg by mouth at bedtime.      budesonide-formoterol (SYMBICORT) 80-4.5 MCG/ACT Inhaler Inhale 2 puffs into the lungs as needed      bumetanide (BUMEX) 1 MG tablet Take 1 mg by mouth as needed (swelling, SOB). (Patient not taking: Reported on 1/13/2025)      Calcium Carbonate-Vitamin D (OYSTER SHELL CALCIUM/D) 500-5 MG-MCG TABS Take 1 tablet by mouth daily      carvedilol (COREG) 3.125 MG tablet Take 2 tablets (6.25 mg) by mouth 2 times daily.      cetirizine (ZYRTEC) 10 MG tablet Take 10 mg by mouth daily      DOXYCYCLINE HYCLATE PO Take 100 mg by mouth every morning      empagliflozin (JARDIANCE) 10 MG TABS tablet Take 1 tablet (10 mg) by mouth daily. 30 tablet 1    EPINEPHrine (ANY BX GENERIC EQUIV) 0.3 MG/0.3ML injection 2-pack Inject 0.3 mg into the muscle as needed for anaphylaxis      eplerenone (INSPRA) 25 MG tablet Take 25 mg by mouth every morning.      methocarbamol (ROBAXIN) 750 MG tablet Take 1 tablet (750 mg) by mouth every 6  hours as needed for muscle spasms or other (pain). 90 tablet 0    oxyCODONE (ROXICODONE) 5 MG tablet Take 1 tablet (5 mg) by mouth every 6 hours as needed for severe pain. 15 tablet 0    OZEMPIC, 2 MG/DOSE, 8 MG/3ML pen Inject 2 mg subcutaneously every 7 days.   Last dose:       polyethylene glycol (MIRALAX) 17 GM/Dose powder Take 17 g by mouth daily as needed for constipation. 510 g 0    sacubitril-valsartan (ENTRESTO) 49-51 MG per tablet Take 1 tablet by mouth 2 times daily. 60 tablet 3    senna-docusate (SENOKOT-S/PERICOLACE) 8.6-50 MG tablet Take 2 tablets by mouth or Feeding Tube 2 times daily as needed for constipation. 30 tablet 0    Vitamin D3 50 mcg (2000 units) tablet Take 1 tablet by mouth daily.       Allergies   Allergen Reactions    Bee Venom Anaphylaxis    Perflutren Lipid Microspheres Other (See Comments)     Severe lower back pain (Definity)    Other Reaction(s): Other (see comments)      Severe lower back pain (Definity)     Past medical history, past surgical history, medications, and allergies were reviewed with the patient. Additional pertinent items: None    Social History     Socioeconomic History    Marital status: Single     Spouse name: Not on file    Number of children: Not on file    Years of education: Not on file    Highest education level: Not on file   Occupational History    Not on file   Tobacco Use    Smoking status: Former     Current packs/day: 0.00     Types: Cigarettes     Quit date:      Years since quittin.0    Smokeless tobacco: Never   Substance and Sexual Activity    Alcohol use: Yes     Comment: 2x a year    Drug use: Not Currently    Sexual activity: Not on file   Other Topics Concern    Not on file   Social History Narrative    Not on file     Social Drivers of Health     Financial Resource Strain: Low Risk  (9/15/2024)    Financial Resource Strain     Within the past 12 months, have you or your family members you live with been unable to get  utilities (heat, electricity) when it was really needed?: No   Food Insecurity: Not on File (9/26/2024)    Received from [email or web address]    Food Insecurity     Food: 0   Transportation Needs: Low Risk  (9/15/2024)    Transportation Needs     Within the past 12 months, has lack of transportation kept you from medical appointments, getting your medicines, non-medical meetings or appointments, work, or from getting things that you need?: No   Physical Activity: Not on File (12/15/2023)    Received from [email or web address]    Physical Activity     Physical Activity: 0   Stress: Not on File (12/15/2023)    Received from [email or web address]    Stress     Stress: 0   Social Connections: Not on File (9/13/2024)    Received from [email or web address]    Social Connections     Connectedness: 0   Interpersonal Safety: Low Risk  (9/10/2024)    Interpersonal Safety     Do you feel physically and emotionally safe where you currently live?: Yes     Within the past 12 months, have you been hit, slapped, kicked or otherwise physically hurt by someone?: No     Within the past 12 months, have you been humiliated or emotionally abused in other ways by your partner or ex-partner?: No   Housing Stability: High Risk (9/15/2024)    Housing Stability     Do you have housing? : No     Are you worried about losing your housing?: No     Social history was reviewed with the patient. Additional pertinent items: None    Review of Systems  A medically appropriate review of systems was performed with pertinent positives and negatives noted in the HPI, and all other systems negative.    Physical Exam   BP: 122/79  Pulse: 84  Temp: 97.6  F (36.4  C)  Resp: 18  Weight: 107.5 kg (237 lb)  SpO2: 99 %      General: Well nourished, well developed, NAD  HEENT: EOMI, anicteric. NCAT, MMM  Neck: no jugular venous distension, supple, nl ROM  Cardiac: Regular rate and rhythm. No murmurs, rubs, or gallops. Normal S1, S2.  Intact peripheral pulses  Pulm: CTAB, no stridor, wheezes, rales, rhonchi  Abd: Soft,  nontender, nondistended.  No masses palpated.    Skin: Warm and dry to the touch.  No rash  Extremities: No LE edema, no cyanosis, w/w/p  Neuro: A&Ox3, no gross focal deficits    ED Course        Procedures                    EKG Interpretation:      Interpreted by Arpita Zee MD  Time reviewed: 957  Symptoms at time of EKG: Chest pain  Rhythm: normal sinus, 82 bpm  Rate: normal  Axis: Right  Ectopy: none  Conduction: normal  ST Segments/ T Waves: No ST-T wave changes  Q Waves: Inferior  Comparison to prior: Unchanged from EKG dated September 10, 2024    Clinical Impression: abnormal EKG           Labs Ordered and Resulted from Time of ED Arrival to Time of ED Departure   COMPREHENSIVE METABOLIC PANEL - Abnormal       Result Value    Sodium 140      Potassium 4.5      Carbon Dioxide (CO2) 24      Anion Gap 13      Urea Nitrogen 17.7      Creatinine 1.09      GFR Estimate 80      Calcium 9.9      Chloride 103      Glucose 100 (*)     Alkaline Phosphatase 163 (*)     AST 32      ALT 24      Protein Total 8.8 (*)     Albumin 4.8      Bilirubin Total 0.7     CBC WITH PLATELETS AND DIFFERENTIAL - Abnormal    WBC Count 5.9      RBC Count 6.26 (*)     Hemoglobin 16.6      Hematocrit 53.3 (*)     MCV 85      MCH 26.5      MCHC 31.1 (*)     RDW 17.6 (*)     Platelet Count 184      % Neutrophils 66      % Lymphocytes 25      % Monocytes 6      % Eosinophils 3      % Basophils 0      % Immature Granulocytes 0      NRBCs per 100 WBC 0      Absolute Neutrophils 3.9      Absolute Lymphocytes 1.5      Absolute Monocytes 0.4      Absolute Eosinophils 0.2      Absolute Basophils 0.0      Absolute Immature Granulocytes 0.0      Absolute NRBCs 0.0     TROPONIN T, HIGH SENSITIVITY - Normal    Troponin T, High Sensitivity 14     NT PROBNP INPATIENT - Normal    N terminal Pro BNP Inpatient 543     D DIMER QUANTITATIVE - Normal    D-Dimer Quantitative 0.50     INFLUENZA A/B, RSV AND SARS-COV2 PCR - Normal    Influenza A PCR  Negative      Influenza B PCR Negative      RSV PCR Negative      SARS CoV2 PCR Negative     TROPONIN T, HIGH SENSITIVITY - Normal    Troponin T, High Sensitivity 13       XR Chest 2 Views   Final Result   IMPRESSION: Possible mild main pulmonary artery enlargement. Please   correlate for any evidence of pulmonary hypertension. Otherwise no   acute finding.      JOSE TATE MD            SYSTEM ID:  [id number]               Results for orders placed or performed during the hospital encounter of 02/04/25 (from the past 24 hours)   EKG 12 lead   Result Value Ref Range    Systolic Blood Pressure  mmHg    Diastolic Blood Pressure  mmHg    Ventricular Rate 82 BPM    Atrial Rate 82 BPM    MI Interval 152 ms    QRS Duration 86 ms     ms    QTc 446 ms    P Axis 71 degrees    R AXIS 90 degrees    T Axis 18 degrees    Interpretation ECG       Sinus rhythm  Rightward axis  Inferior infarct , age undetermined  Cannot rule out Anterior infarct , age undetermined  Abnormal ECG         Labs, vital signs, and imaging studies were reviewed by me.    Medications - No data to display    Assessments & Plan (with Medical Decision Making)   Scott Donato is a 54 year old male who presents to the emergency department with chest pain.  Differential diagnosis includes ACS, musculoskeletal chest wall pain, CHF, pneumonia, bronchitis, viral syndrome, anxiety.  Labs, EKG, chest x-ray ordered to further evaluate the patient in the emergency department.    EKG shows normal sinus rhythm.  There are inferior Q waves, unchanged compared to prior.    Laboratory workup is remarkable for normal D-dimer at 0.5, normal BNP, negative influenza/RSV/COVID testing, troponin within normal limits x 2.  CMP is unremarkable aside from slight elevation of alk phos.  CBC is remarkable for normal white blood cell count and hemoglobin.    Chest x-ray shows no acute findings.    Critical care was not performed.     Medical Decision Making  The patient's  presentation was of high complexity (an acute health issue posing potential threat to life or bodily function).    The patient's evaluation involved:  ordering and/or review of 3+ test(s) in this encounter (see separate area of note for details)  independent interpretation of testing performed by another health professional (see separate area of note for details)    The patient's management necessitated only low risk treatment.    Chest x-ray images were personally reviewed by me, I agree with the radiology reads.    I have reviewed the nursing notes.    I have reviewed the findings, diagnosis, plan and need for follow up with the patient.    Patient to be discharged home. Advised to follow up with PCP within 1 week. To return to ER immediately with any new/worsening symptoms. Plan of care discussed with patient who expresses understanding and agrees with plan of care.    New Prescriptions    No medications on file       Final diagnoses:   Acute chest pain       VELMA ZEE MD  2/4/2025   [facility] EMERGENCY DEPARTMENT       Velma Zee MD  02/06/25 1032       Velma Zee MD  02/06/25 1036

## 2025-02-04 NOTE — DISCHARGE INSTRUCTIONS
TODAY'S VISIT:  You were seen today for chest pain     - Take a dose of bumex daily until your weight has returned to your dry weight.  -   - If you had any labs or imaging/radiology tests performed today, you should also discuss these tests with your usual provider.     FOLLOW-UP:  Please make an appointment to follow up with:  - Your Primary Care Provider. If you do not have a PCP, please call the Primary Care Center (phone: [phone] for an appointment  - Call your cardiologist to schedule follow up ASAP    - Have your provider review the results from today's visit with you again to make sure no further follow-up or additional testing is needed based on those results.     RETURN TO THE EMERGENCY DEPARTMENT  Return to the Emergency Department at any time for any new or worsening symptoms or any concerns.

## 2025-02-06 ENCOUNTER — TELEPHONE (OUTPATIENT)
Dept: CARDIOLOGY | Facility: CLINIC | Age: 55
End: 2025-02-06
Payer: COMMERCIAL

## 2025-02-06 DIAGNOSIS — I50.22 CHRONIC SYSTOLIC HEART FAILURE (H): Primary | ICD-10-CM

## 2025-02-06 NOTE — TELEPHONE ENCOUNTER
"Pt chart reviewed; he presented to the ER 2 days ago with chest tightness and weight gain of 4-5#. Pt discharged with Follow-up with PCP.     Pt seen in [facility] clinic 1/13 and had reported continued fatigue and didn't want to increase Entresto further.     Called pt; he states that he no longer has any chest pain or tightness. He has been taking Bumex 1 mg daily for the past 2 days and his weight is slowly coming back down to baseline. Today's weight was 235#. Pt denies any SOB or swelling.     Pt continues to feel fatigued but \"it's better than a week ago.\" Pt's answers are vague, difficult to assess.     Pt reports that he has had some GI upset and feels \"flushed\" sometimes about an hour after taking his AM meds. Discussed with pt re-arranging med schedule on once per day HF meds so he isn't taking so many meds all at once.     Pt's BP's have been stable 115-116/70-80's. Denies any lightheadedness.     Writer advised pt to take PRN Bumex 1 mg daily until weight is back down to 233# where he is most comfortable.     Will plan to Follow-up with pt early next week to make sure he is still feeling okay.     TOMA Bonilla.     Jovita Herrera RN    "

## 2025-02-06 NOTE — TELEPHONE ENCOUNTER
----- Message from Jovita HICKS sent at 1/23/2025 10:10 AM CST -----  Check in with pt to see if fatigue has improved at all; pt did not want to decrease Entresto dose. Update Irene.

## 2025-02-11 NOTE — TELEPHONE ENCOUNTER
Called pt to Follow-up on how daily PRN Bumex 1 mg is helping with his recent weight gain.     Pt states that he has been taking Bume 1 mg daily since 2/7/25; his weight has not changed, he is still at 235#. He is most comfortable at 231-233#.     Pt denies any swelling or SOB. Pt continues to endorse some fatigue which he he unable to elaborate on.     Routing to Irene/team to review.     Jovita Herrera RN

## 2025-02-12 NOTE — TELEPHONE ENCOUNTER
Date: 2/12/2025    Time of Call: 1:36 PM     Diagnosis:  HF      [ TORB ] Ordering provider: Aurora Asencio   Order:   -Increase Bumex to 2 mg daily x 3 days.   -BMP in 1 week.        Order received by: Jovita Herrera RN       Follow-up/additional notes: Called pt; he states that his weight has improved since yesterday, is 233# today. He would like to hold off on increasing Bumex any further until Friday 2/14. Writer will check back in with pt Friday.

## 2025-03-24 ENCOUNTER — MYC MEDICAL ADVICE (OUTPATIENT)
Dept: CARDIOLOGY | Facility: CLINIC | Age: 55
End: 2025-03-24
Payer: COMMERCIAL

## 2025-04-23 ENCOUNTER — TELEPHONE (OUTPATIENT)
Dept: CARDIOLOGY | Facility: CLINIC | Age: 55
End: 2025-04-23
Payer: COMMERCIAL

## 2025-04-23 NOTE — TELEPHONE ENCOUNTER
[facility] Call Center    Phone Message    May a detailed message be left on voicemail: yes     Reason for Call: Other: Scott called requesting to speak with his care team about some symptoms he has been experiencing. Scott states he is fatigued and sweating with some lightheadedness. Scott declined speaking with a triage nurse and would like to speak with his care team. Please reach out to Scott to discuss. Thank you!     Action Taken: Other: Cardiology    Travel Screening: Not Applicable    Thank you!  [facility]       Date of Service:

## 2025-04-23 NOTE — TELEPHONE ENCOUNTER
Called Scott to further assess symptoms.   He reports this is what happened at work - had a dizzy spell, got very warm, fatigued, dizzy, vomited a couple times, had a headache. Bp at the time was 138/90.  He's currently on an antibiotic - between Friday and Sunday he lost 9 lbs and he thinks he's dehydrated. . Most recent weight 236 lbs (was 233-235 at last [facility] visit).  No worsening swelling or shortness of breath. Bumex is ordered as 1 mg PRN, he has been taking 1 mg daily. Advised to not take this for a few days, I will review with Irene for any other recommendations.     Has [facility] follow up 5/5.

## 2025-04-25 ENCOUNTER — APPOINTMENT (OUTPATIENT)
Dept: GENERAL RADIOLOGY | Facility: CLINIC | Age: 55
End: 2025-04-25
Attending: STUDENT IN AN ORGANIZED HEALTH CARE EDUCATION/TRAINING PROGRAM
Payer: COMMERCIAL

## 2025-04-25 ENCOUNTER — HOSPITAL ENCOUNTER (EMERGENCY)
Facility: CLINIC | Age: 55
Discharge: HOME | End: 2025-04-25
Attending: STUDENT IN AN ORGANIZED HEALTH CARE EDUCATION/TRAINING PROGRAM | Admitting: STUDENT IN AN ORGANIZED HEALTH CARE EDUCATION/TRAINING PROGRAM
Payer: COMMERCIAL

## 2025-04-25 ENCOUNTER — APPOINTMENT (OUTPATIENT)
Dept: ULTRASOUND IMAGING | Facility: CLINIC | Age: 55
End: 2025-04-25
Attending: STUDENT IN AN ORGANIZED HEALTH CARE EDUCATION/TRAINING PROGRAM
Payer: COMMERCIAL

## 2025-04-25 VITALS
HEIGHT: 74 IN | SYSTOLIC BLOOD PRESSURE: 144 MMHG | OXYGEN SATURATION: 100 % | RESPIRATION RATE: 18 BRPM | DIASTOLIC BLOOD PRESSURE: 94 MMHG | WEIGHT: 240.2 LBS | HEART RATE: 81 BPM | TEMPERATURE: 97.9 F | BODY MASS INDEX: 30.83 KG/M2

## 2025-04-25 DIAGNOSIS — R74.8 ABNORMAL SERUM LEVEL OF LIPASE: ICD-10-CM

## 2025-04-25 DIAGNOSIS — K80.20 CALCULUS OF GALLBLADDER WITHOUT CHOLECYSTITIS WITHOUT OBSTRUCTION: ICD-10-CM

## 2025-04-25 DIAGNOSIS — R11.2 NAUSEA VOMITING AND DIARRHEA: ICD-10-CM

## 2025-04-25 DIAGNOSIS — R19.7 NAUSEA VOMITING AND DIARRHEA: ICD-10-CM

## 2025-04-25 LAB
ALBUMIN SERPL BCG-MCNC: 4.6 G/DL (ref 3.5–5.2)
ALP SERPL-CCNC: 135 U/L (ref 40–150)
ALT SERPL W P-5'-P-CCNC: 30 U/L (ref 0–70)
ANION GAP SERPL CALCULATED.3IONS-SCNC: 11 MMOL/L (ref 7–15)
AST SERPL W P-5'-P-CCNC: 31 U/L (ref 0–45)
BASOPHILS # BLD AUTO: 0 10E3/UL (ref 0–0.2)
BASOPHILS NFR BLD AUTO: 1 %
BILIRUB SERPL-MCNC: 0.6 MG/DL
BUN SERPL-MCNC: 23.3 MG/DL (ref 6–20)
CALCIUM SERPL-MCNC: 10.4 MG/DL (ref 8.8–10.4)
CHLORIDE SERPL-SCNC: 105 MMOL/L (ref 98–107)
CREAT SERPL-MCNC: 1.17 MG/DL (ref 0.67–1.17)
EGFRCR SERPLBLD CKD-EPI 2021: 74 ML/MIN/1.73M2
EOSINOPHIL # BLD AUTO: 0.1 10E3/UL (ref 0–0.7)
EOSINOPHIL NFR BLD AUTO: 2 %
ERYTHROCYTE [DISTWIDTH] IN BLOOD BY AUTOMATED COUNT: 14.5 % (ref 10–15)
GLUCOSE SERPL-MCNC: 120 MG/DL (ref 70–99)
HCO3 SERPL-SCNC: 28 MMOL/L (ref 22–29)
HCT VFR BLD AUTO: 48.9 % (ref 40–53)
HGB BLD-MCNC: 15.8 G/DL (ref 13.3–17.7)
IMM GRANULOCYTES # BLD: 0 10E3/UL
IMM GRANULOCYTES NFR BLD: 0 %
INR PPP: 1.04 (ref 0.85–1.15)
LIPASE SERPL-CCNC: 85 U/L (ref 13–60)
LYMPHOCYTES # BLD AUTO: 1.9 10E3/UL (ref 0.8–5.3)
LYMPHOCYTES NFR BLD AUTO: 29 %
MAGNESIUM SERPL-MCNC: 2.3 MG/DL (ref 1.7–2.3)
MCH RBC QN AUTO: 27.8 PG (ref 26.5–33)
MCHC RBC AUTO-ENTMCNC: 32.3 G/DL (ref 31.5–36.5)
MCV RBC AUTO: 86 FL (ref 78–100)
MONOCYTES # BLD AUTO: 0.5 10E3/UL (ref 0–1.3)
MONOCYTES NFR BLD AUTO: 8 %
NEUTROPHILS # BLD AUTO: 4.1 10E3/UL (ref 1.6–8.3)
NEUTROPHILS NFR BLD AUTO: 61 %
NRBC # BLD AUTO: 0 10E3/UL
NRBC BLD AUTO-RTO: 0 /100
PHOSPHATE SERPL-MCNC: 3.6 MG/DL (ref 2.5–4.5)
PLATELET # BLD AUTO: 170 10E3/UL (ref 150–450)
POTASSIUM SERPL-SCNC: 4.8 MMOL/L (ref 3.4–5.3)
PROT SERPL-MCNC: 8.2 G/DL (ref 6.4–8.3)
RBC # BLD AUTO: 5.68 10E6/UL (ref 4.4–5.9)
SODIUM SERPL-SCNC: 144 MMOL/L (ref 135–145)
TROPONIN T SERPL HS-MCNC: 11 NG/L
TROPONIN T SERPL HS-MCNC: 11 NG/L
WBC # BLD AUTO: 6.7 10E3/UL (ref 4–11)

## 2025-04-25 PROCEDURE — 84484 ASSAY OF TROPONIN QUANT: CPT | Performed by: STUDENT IN AN ORGANIZED HEALTH CARE EDUCATION/TRAINING PROGRAM

## 2025-04-25 PROCEDURE — 93010 ELECTROCARDIOGRAM REPORT: CPT | Performed by: STUDENT IN AN ORGANIZED HEALTH CARE EDUCATION/TRAINING PROGRAM

## 2025-04-25 PROCEDURE — 83735 ASSAY OF MAGNESIUM: CPT | Performed by: STUDENT IN AN ORGANIZED HEALTH CARE EDUCATION/TRAINING PROGRAM

## 2025-04-25 PROCEDURE — 71046 X-RAY EXAM CHEST 2 VIEWS: CPT | Mod: 26 | Performed by: INTERNAL MEDICINE

## 2025-04-25 PROCEDURE — 76705 ECHO EXAM OF ABDOMEN: CPT | Mod: 26 | Performed by: RADIOLOGY

## 2025-04-25 PROCEDURE — 96361 HYDRATE IV INFUSION ADD-ON: CPT | Performed by: STUDENT IN AN ORGANIZED HEALTH CARE EDUCATION/TRAINING PROGRAM

## 2025-04-25 PROCEDURE — 99285 EMERGENCY DEPT VISIT HI MDM: CPT | Mod: 25 | Performed by: STUDENT IN AN ORGANIZED HEALTH CARE EDUCATION/TRAINING PROGRAM

## 2025-04-25 PROCEDURE — 84075 ASSAY ALKALINE PHOSPHATASE: CPT | Performed by: STUDENT IN AN ORGANIZED HEALTH CARE EDUCATION/TRAINING PROGRAM

## 2025-04-25 PROCEDURE — 93005 ELECTROCARDIOGRAM TRACING: CPT | Performed by: STUDENT IN AN ORGANIZED HEALTH CARE EDUCATION/TRAINING PROGRAM

## 2025-04-25 PROCEDURE — 258N000003 HC RX IP 258 OP 636: Performed by: STUDENT IN AN ORGANIZED HEALTH CARE EDUCATION/TRAINING PROGRAM

## 2025-04-25 PROCEDURE — 250N000013 HC RX MED GY IP 250 OP 250 PS 637: Performed by: STUDENT IN AN ORGANIZED HEALTH CARE EDUCATION/TRAINING PROGRAM

## 2025-04-25 PROCEDURE — 96374 THER/PROPH/DIAG INJ IV PUSH: CPT | Performed by: STUDENT IN AN ORGANIZED HEALTH CARE EDUCATION/TRAINING PROGRAM

## 2025-04-25 PROCEDURE — 36415 COLL VENOUS BLD VENIPUNCTURE: CPT | Performed by: STUDENT IN AN ORGANIZED HEALTH CARE EDUCATION/TRAINING PROGRAM

## 2025-04-25 PROCEDURE — 83690 ASSAY OF LIPASE: CPT | Performed by: STUDENT IN AN ORGANIZED HEALTH CARE EDUCATION/TRAINING PROGRAM

## 2025-04-25 PROCEDURE — 96375 TX/PRO/DX INJ NEW DRUG ADDON: CPT | Performed by: STUDENT IN AN ORGANIZED HEALTH CARE EDUCATION/TRAINING PROGRAM

## 2025-04-25 PROCEDURE — 84100 ASSAY OF PHOSPHORUS: CPT | Performed by: STUDENT IN AN ORGANIZED HEALTH CARE EDUCATION/TRAINING PROGRAM

## 2025-04-25 PROCEDURE — 85004 AUTOMATED DIFF WBC COUNT: CPT | Performed by: STUDENT IN AN ORGANIZED HEALTH CARE EDUCATION/TRAINING PROGRAM

## 2025-04-25 PROCEDURE — 99284 EMERGENCY DEPT VISIT MOD MDM: CPT | Performed by: STUDENT IN AN ORGANIZED HEALTH CARE EDUCATION/TRAINING PROGRAM

## 2025-04-25 PROCEDURE — 71046 X-RAY EXAM CHEST 2 VIEWS: CPT

## 2025-04-25 PROCEDURE — 76705 ECHO EXAM OF ABDOMEN: CPT

## 2025-04-25 PROCEDURE — 250N000011 HC RX IP 250 OP 636: Performed by: STUDENT IN AN ORGANIZED HEALTH CARE EDUCATION/TRAINING PROGRAM

## 2025-04-25 PROCEDURE — 85610 PROTHROMBIN TIME: CPT | Performed by: STUDENT IN AN ORGANIZED HEALTH CARE EDUCATION/TRAINING PROGRAM

## 2025-04-25 RX ORDER — MAGNESIUM HYDROXIDE/ALUMINUM HYDROXICE/SIMETHICONE 120; 1200; 1200 MG/30ML; MG/30ML; MG/30ML
30 SUSPENSION ORAL ONCE
Status: COMPLETED | OUTPATIENT
Start: 2025-04-25 | End: 2025-04-25

## 2025-04-25 RX ORDER — ASPIRIN 81 MG/1
324 TABLET, CHEWABLE ORAL ONCE
Status: COMPLETED | OUTPATIENT
Start: 2025-04-25 | End: 2025-04-25

## 2025-04-25 RX ORDER — ONDANSETRON 2 MG/ML
4 INJECTION INTRAMUSCULAR; INTRAVENOUS EVERY 30 MIN PRN
Status: DISCONTINUED | OUTPATIENT
Start: 2025-04-25 | End: 2025-04-26 | Stop reason: HOSPADM

## 2025-04-25 RX ADMIN — ASPIRIN 81 MG CHEWABLE TABLET 324 MG: 81 TABLET CHEWABLE at 20:55

## 2025-04-25 RX ADMIN — ALUMINUM HYDROXIDE, MAGNESIUM HYDROXIDE, AND SIMETHICONE 30 ML: 1200; 120; 1200 SUSPENSION ORAL at 23:29

## 2025-04-25 RX ADMIN — ONDANSETRON 4 MG: 2 INJECTION, SOLUTION INTRAMUSCULAR; INTRAVENOUS at 20:23

## 2025-04-25 RX ADMIN — FAMOTIDINE 20 MG: 10 INJECTION, SOLUTION INTRAVENOUS at 23:29

## 2025-04-25 RX ADMIN — SODIUM CHLORIDE, SODIUM LACTATE, POTASSIUM CHLORIDE, AND CALCIUM CHLORIDE 1000 ML: .6; .31; .03; .02 INJECTION, SOLUTION INTRAVENOUS at 20:24

## 2025-04-25 ASSESSMENT — ACTIVITIES OF DAILY LIVING (ADL)
ADLS_ACUITY_SCORE: 56

## 2025-04-26 LAB
ATRIAL RATE - MUSE: 82 BPM
DIASTOLIC BLOOD PRESSURE - MUSE: NORMAL MMHG
INTERPRETATION ECG - MUSE: NORMAL
P AXIS - MUSE: 48 DEGREES
PR INTERVAL - MUSE: 154 MS
QRS DURATION - MUSE: 86 MS
QT - MUSE: 374 MS
QTC - MUSE: 436 MS
R AXIS - MUSE: -25 DEGREES
SYSTOLIC BLOOD PRESSURE - MUSE: NORMAL MMHG
T AXIS - MUSE: 105 DEGREES
VENTRICULAR RATE- MUSE: 82 BPM

## 2025-04-26 NOTE — DISCHARGE INSTRUCTIONS
You are seen in emergency department due to nausea, vomiting and diarrhea.  You had overall relatively reassuring workup, but were found to have a very slight elevation of your lipase.  It is not enough that I would consider this to be pancreatitis, but we would recommend that you follow-up with your primary care doctor to have this retested and the near future.  You do have gallstones, but there is no evidence at this time that you have an infection of your gallbladder.  Please note that at times if gallstones do pass, they can cause elevation in your lipase and cause pancreatitis.  I do not see evidence at this time that you need to stay in the hospital or undergo surgery related to these at this time.  However, in the future, it may be reasonable to have your gallbladder taken out with the gallstones that are in there.    This is something that you should have a discussion with surgery about and a referral was placed for you to talk to them    However if you develop severe worsening pain especially associated with worsening nausea and vomiting, return to the emergency department for further evaluation.  Please return to the emergency department worsening severe persistent chest pain, especially associated with nausea, sweatiness, or feeling like you are going to pass out

## 2025-04-26 NOTE — ED TRIAGE NOTES
Pt comes in for Chest pain with associated N/V.  Pt rates it 2/10, sharp in the left chest, it comes and goes intermittently, seemingly brought on by stress.  It has been happening for approx 2 hrs.  Pt has hx of CABG x4     Triage Assessment (Adult)       Row Name 04/25/25 1932          Triage Assessment    Airway WDL WDL        Respiratory WDL    Respiratory WDL WDL        Skin Circulation/Temperature WDL    Skin Circulation/Temperature WDL WDL        Cardiac WDL    Cardiac WDL X;chest pain        Chest Pain Assessment    Chest Pain Location anterior chest, left     Character sharp     Duration 2 hrs     Precipitating Factors emotional stress        Peripheral/Neurovascular WDL    Peripheral Neurovascular WDL WDL        Cognitive/Neuro/Behavioral WDL    Cognitive/Neuro/Behavioral WDL WDL

## 2025-04-26 NOTE — ED PROVIDER NOTES
[place] EMERGENCY DEPARTMENT ([facility])    4/25/25       ED PROVIDER NOTE     History     Chief Complaint   Patient presents with    Chest Pain    Nausea & Vomiting     HPI  Scott Donato is a 54 year old male with a history of HFrEF (EF:40-45% on 12/30/2024) on Bumex, type 2 diabetes complicated by bilateral diabetic foot ulcers with underlying osteomyelitis, and CAD s/p CABG x4 vessels (DOS 9/2024) who presents to the ED for chest pain, nausea and vomiting.    She has had a complicated lower extremity infectious history, ultimately undergoing I&D in February, followed by Augmentin and clindamycin, MRI showing osteomyelitis, right, and now undergoing Augmentin twice daily starting on 4/15/25 (last Tuesday).  He notes that he began having some nausea, vomiting and some diarrhea over the last week, and has been taking his Bumex along with it.  Notes that over the weekend he was having 4-5 episodes of diarrheal stools each day, and feeling just generally fatigued.  2 days ago he was at work and felt near syncopal, to the point where he laid on the ground to try and improve his dizziness, and feeling like he was going to pass out.    Now he continues to have episodes of nausea, and the diarrheal stools have improved but he continues to feel very fatigued.  He also notes that he has been having episodes of left-sided sharp chest pain rated 2 out of 10 in severity, coming and going primarily during times of stress.  Chest pain is nonexertional, nonpleuritic.  He notes that he had 1 of these episodes earlier today, they lasted for only about 2 minutes, and then go away.  He is not currently having chest pain on arrival but is noting to be very nauseous.      Past Medical History  Past Medical History:   Diagnosis Date    Asthma     CAD (coronary artery disease)     Charcot foot due to diabetes mellitus (H)     Chronic kidney disease     DM2 (diabetes mellitus, type 2) (H)     Dyspnea on exertion     Former  smoker     Heart failure with reduced ejection fraction, NYHA class III (H)     Hyperlipidemia LDL goal <100     Orthopnea      Past Surgical History:   Procedure Laterality Date    Amputation of foot Right 2023    APPENDECTOMY      BYPASS GRAFT ARTERY CORONARY N/A 9/10/2024    Procedure: median sternomy, coronary artery bypass graft X 4, Left internal mammary haverst, Left endoscopic saphenous emory havest, on cardiopulmonary bypass, Transesophageal Echocardiogram, Right femoral balloon pump and right fluroscopy.;  Surgeon: Evan Giles MD;  Location: [facility] OR    COLONOSCOPY      INSERT INTRAAORTIC BALLOON PUMP N/A 9/10/2024    Procedure: Insert intraaortic balloon pump;  Surgeon: Evan Giles MD;  Location: [facility] OR    IRRIGATION AND DEBRIDEMENT Left     foot     acetaminophen (TYLENOL) 500 MG tablet  albuterol (PROAIR HFA/PROVENTIL HFA/VENTOLIN HFA) 108 (90 Base) MCG/ACT inhaler  aspirin 81 MG EC tablet  atorvastatin (LIPITOR) 40 MG tablet  budesonide-formoterol (SYMBICORT) 80-4.5 MCG/ACT Inhaler  bumetanide (BUMEX) 1 MG tablet  Calcium Carbonate-Vitamin D (OYSTER SHELL CALCIUM/D) 500-5 MG-MCG TABS  carvedilol (COREG) 3.125 MG tablet  cetirizine (ZYRTEC) 10 MG tablet  DOXYCYCLINE HYCLATE PO  empagliflozin (JARDIANCE) 10 MG TABS tablet  EPINEPHrine (ANY BX GENERIC EQUIV) 0.3 MG/0.3ML injection 2-pack  eplerenone (INSPRA) 25 MG tablet  methocarbamol (ROBAXIN) 750 MG tablet  oxyCODONE (ROXICODONE) 5 MG tablet  OZEMPIC, 2 MG/DOSE, 8 MG/3ML pen  polyethylene glycol (MIRALAX) 17 GM/Dose powder  sacubitril-valsartan (ENTRESTO) 49-51 MG per tablet  senna-docusate (SENOKOT-S/PERICOLACE) 8.6-50 MG tablet  Vitamin D3 50 mcg (2000 units) tablet      Allergies   Allergen Reactions    Bee Venom Anaphylaxis    Perflutren Lipid Microspheres Other (See Comments)     Severe lower back pain (Definity)    Other Reaction(s): Other (see comments)      Severe lower back pain (Definity)     Family History  Family History  "  Problem Relation Age of Onset    Anesthesia Reaction Mother         Slow to wake    Coronary Artery Disease Maternal Grandfather     Coronary Artery Disease Paternal Grandmother     Coronary Artery Disease Paternal Grandfather     Lung Cancer Paternal Grandfather     Substance Abuse Son     Alcoholism Maternal Aunt     Arthritis Maternal Aunt     Alcoholism Maternal Uncle     Clotting Disorder No family hx of     Bleeding Disorder No family hx of      Social History   Social History     Tobacco Use    Smoking status: Former     Current packs/day: 0.00     Types: Cigarettes     Quit date:      Years since quittin.3    Smokeless tobacco: Never   Substance Use Topics    Alcohol use: Yes     Comment: 2x a year    Drug use: Not Currently      A medically appropriate review of systems was performed with pertinent positives and negatives noted in the HPI, and all other systems negative.    Physical Exam   BP: (!) 144/94  Pulse: 81  Temp: 97.9  F (36.6  C)  Resp: 18  Height: 188 cm (6' 2\")  Weight: 109 kg (240 lb 3.2 oz)  SpO2: 100 %  Physical Exam  GEN: Well appearing, non toxic, cooperative uncomfortable, retching on exam, not diaphoretic  HEENT: normocephalic and atraumatic, PERRLA, EOMI  CV: well-perfused, normal skin color for ethnicity, regular rate and rhythm, no murmurs rubs or gallops  PULM: breathing comfortably, in no respiratory distress, clear to auscultation upper and lower lung fields  ABD: nondistended, epigastric and right upper quadrant abdominal tenderness, equivocal Vázquez sign, negative McBurney point  EXT: Full range of motion.  No edema.  NEURO: awake, conversant, grossly normal bilateral upper and lower extremity strength & ROM   SKIN: No rashes, ecchymosis, or lacerations  PSYCH: Calm and cooperative, interactive      ED Course, Procedures, & Data      Procedures            EKG Interpretation:      Interpreted by Martina Zhu MD  Time reviewed: 1933  Symptoms at time of EKG: nausea "   Rhythm: normal sinus   Rate: normal  Axis: normal  Ectopy: Occasional PVCs  Conduction: normal  ST Segments/ T Waves: Poor anterior R wave progression  Q Waves: none  Comparison to prior: see below    Clinical Impression: New poor anterior R wave progression compared to EKG from February 4, 2025     Results for orders placed or performed during the hospital encounter of 04/25/25   XR Chest 2 Views     Status: None    Narrative    EXAM: XR CHEST 2 VIEWS  LOCATION: [facility]  DATE: 4/25/2025    INDICATION: chest pain, NV  COMPARISON: 02/04/2025.      Impression    IMPRESSION: Stable cardiac silhouette with postsurgical changes. No focal airspace opacity. No pleural effusion or discernible pneumothorax.   US Abdomen Limited (RUQ)     Status: None    Narrative    EXAM: US ABDOMEN LIMITED  LOCATION: [facility]  DATE: 4/25/2025    INDICATION: epigastric, RUQ AP, NV  COMPARISON: None.  TECHNIQUE: Limited abdominal ultrasound.    FINDINGS:    GALLBLADDER: Cholelithiasis. Normal gallbladder wall thickness. No pericholecystic fluid. Negative ultrasound Vázquez sign.    BILE DUCTS: No biliary dilatation. The common duct measures 4 mm.    LIVER: Echogenic parenchyma. No focal mass. The portal vein is patent with flow in the normal direction.    RIGHT KIDNEY: No hydronephrosis.    PANCREAS: The visualized portions are normal.    No ascites.      Impression    IMPRESSION:  1.  Cholelithiasis. No specific evidence for acute cholecystitis.  2.  Hepatic steatosis.       [place] Draw     Status: None (In process)    Narrative    The following orders were created for panel order [place] Draw.  Procedure                               Abnormality         Status                     ---------                               -----------         ------                     Extra Blue Top Tube[[id number]]                             In process                  Extra Red Top Tube[[id number]]                              In process                 Extra Green Top (Lithiu...[[id number]]                      In process                 Extra Purple Top Tube[[id number]]                           In process                   Please view results for these tests on the individual orders.   INR     Status: Normal   Result Value Ref Range    INR 1.04 0.85 - 1.15   Comprehensive metabolic panel     Status: Abnormal   Result Value Ref Range    Sodium 144 135 - 145 mmol/L    Potassium 4.8 3.4 - 5.3 mmol/L    Carbon Dioxide (CO2) 28 22 - 29 mmol/L    Anion Gap 11 7 - 15 mmol/L    Urea Nitrogen 23.3 (H) 6.0 - 20.0 mg/dL    Creatinine 1.17 0.67 - 1.17 mg/dL    GFR Estimate 74 >60 mL/min/1.73m2    Calcium 10.4 8.8 - 10.4 mg/dL    Chloride 105 98 - 107 mmol/L    Glucose 120 (H) 70 - 99 mg/dL    Alkaline Phosphatase 135 40 - 150 U/L    AST 31 0 - 45 U/L    ALT 30 0 - 70 U/L    Protein Total 8.2 6.4 - 8.3 g/dL    Albumin 4.6 3.5 - 5.2 g/dL    Bilirubin Total 0.6 <=1.2 mg/dL   Lipase     Status: Abnormal   Result Value Ref Range    Lipase 85 (H) 13 - 60 U/L   Troponin T, High Sensitivity     Status: Normal   Result Value Ref Range    Troponin T, High Sensitivity 11 <=22 ng/L   Magnesium     Status: Normal   Result Value Ref Range    Magnesium 2.3 1.7 - 2.3 mg/dL   Phosphorus     Status: Normal   Result Value Ref Range    Phosphorus 3.6 2.5 - 4.5 mg/dL   CBC with platelets and differential     Status: None   Result Value Ref Range    WBC Count 6.7 4.0 - 11.0 10e3/uL    RBC Count 5.68 4.40 - 5.90 10e6/uL    Hemoglobin 15.8 13.3 - 17.7 g/dL    Hematocrit 48.9 40.0 - 53.0 %    MCV 86 78 - 100 fL    MCH 27.8 26.5 - 33.0 pg    MCHC 32.3 31.5 - 36.5 g/dL    RDW 14.5 10.0 - 15.0 %    Platelet Count 170 150 - 450 10e3/uL    % Neutrophils 61 %    % Lymphocytes 29 %    % Monocytes 8 %    % Eosinophils 2 %    % Basophils 1 %    % Immature Granulocytes 0 %    NRBCs per 100 WBC 0 <1 /100    Absolute  Neutrophils 4.1 1.6 - 8.3 10e3/uL    Absolute Lymphocytes 1.9 0.8 - 5.3 10e3/uL    Absolute Monocytes 0.5 0.0 - 1.3 10e3/uL    Absolute Eosinophils 0.1 0.0 - 0.7 10e3/uL    Absolute Basophils 0.0 0.0 - 0.2 10e3/uL    Absolute Immature Granulocytes 0.0 <=0.4 10e3/uL    Absolute NRBCs 0.0 10e3/uL   Troponin T, High Sensitivity     Status: Normal   Result Value Ref Range    Troponin T, High Sensitivity 11 <=22 ng/L   EKG 12-lead, tracing only     Status: None (Preliminary result)   Result Value Ref Range    Systolic Blood Pressure  mmHg    Diastolic Blood Pressure  mmHg    Ventricular Rate 82 BPM    Atrial Rate 82 BPM    FL Interval 154 ms    QRS Duration 86 ms     ms    QTc 436 ms    P Axis 48 degrees    R AXIS -25 degrees    T Axis 105 degrees    Interpretation ECG       Sinus rhythm with occasional Premature ventricular complexes  Possible Inferior infarct , age undetermined  Possible Anterolateral infarct , age undetermined  Abnormal ECG     CBC with platelets differential     Status: None    Narrative    The following orders were created for panel order CBC with platelets differential.  Procedure                               Abnormality         Status                     ---------                               -----------         ------                     CBC with platelets and ...[[id number]]                      Final result                 Please view results for these tests on the individual orders.     Medications   ondansetron (ZOFRAN) injection 4 mg (4 mg Intravenous $Given 4/25/25 2023)   lactated ringers BOLUS 1,000 mL (0 mLs Intravenous Stopped 4/25/25 2244)   aspirin (ASA) chewable tablet 324 mg (324 mg Oral $Given 4/25/25 2055)   alum & mag hydroxide-simethicone (MAALOX) suspension 30 mL (30 mLs Oral $Given 4/25/25 2329)   famotidine (PEPCID) injection 20 mg (20 mg Intravenous $Given 4/25/25 2329)     Labs Ordered and Resulted from Time of ED Arrival to Time of ED Departure   COMPREHENSIVE  METABOLIC PANEL - Abnormal       Result Value    Sodium 144      Potassium 4.8      Carbon Dioxide (CO2) 28      Anion Gap 11      Urea Nitrogen 23.3 (*)     Creatinine 1.17      GFR Estimate 74      Calcium 10.4      Chloride 105      Glucose 120 (*)     Alkaline Phosphatase 135      AST 31      ALT 30      Protein Total 8.2      Albumin 4.6      Bilirubin Total 0.6     LIPASE - Abnormal    Lipase 85 (*)    INR - Normal    INR 1.04     TROPONIN T, HIGH SENSITIVITY - Normal    Troponin T, High Sensitivity 11     MAGNESIUM - Normal    Magnesium 2.3     PHOSPHORUS - Normal    Phosphorus 3.6     TROPONIN T, HIGH SENSITIVITY - Normal    Troponin T, High Sensitivity 11     CBC WITH PLATELETS AND DIFFERENTIAL    WBC Count 6.7      RBC Count 5.68      Hemoglobin 15.8      Hematocrit 48.9      MCV 86      MCH 27.8      MCHC 32.3      RDW 14.5      Platelet Count 170      % Neutrophils 61      % Lymphocytes 29      % Monocytes 8      % Eosinophils 2      % Basophils 1      % Immature Granulocytes 0      NRBCs per 100 WBC 0      Absolute Neutrophils 4.1      Absolute Lymphocytes 1.9      Absolute Monocytes 0.5      Absolute Eosinophils 0.1      Absolute Basophils 0.0      Absolute Immature Granulocytes 0.0      Absolute NRBCs 0.0       US Abdomen Limited (RUQ)   Final Result   IMPRESSION:   1.  Cholelithiasis. No specific evidence for acute cholecystitis.   2.  Hepatic steatosis.            XR Chest 2 Views   Final Result   IMPRESSION: Stable cardiac silhouette with postsurgical changes. No focal airspace opacity. No pleural effusion or discernible pneumothorax.             Critical care was not performed.     Medical Decision Making  The patient's presentation was of high complexity (a chronic illness severe exacerbation, progression, or side effect of treatment).    The patient's evaluation involved:  review of external note(s) from 3+ sources (see separate area of note for details)  review of 3+ test result(s) ordered  prior to this encounter (see separate area of note for details)  ordering and/or review of 3+ test(s) in this encounter (see separate area of note for details)    The patient's management necessitated high risk (a decision regarding hospitalization).    Assessment & Plan    54-year-old male with a history of heart failure with reduced ejection fraction on Bumex, CAD status post CABG in September 2024 presenting to the Emergency Department due to 1 week of nausea, vomiting and diarrhea associated with on and off left-sided sharp short interval chest pain that is associated with stress noted to be nauseous and vomiting on arrival, tenderness in the epigastrium and right upper quadrant, but not diaphoretic.    Initial EKG does not demonstrate new anterior poor R wave progression compared to his EKG from February of this year.  He is not endorsing any chest pain at this time, will give him a dose of aspirin however.  With his epigastric tenderness, diarrhea and nausea and vomiting, I do have concerns about possible side effects from his Augmentin that he has been on, dehydration, electrolyte abnormalities, as well as possible gastritis, gastroenteritis, pancreatitis or biliary pathology.    Will give IV fluids, obtain lab work, ultrasound of the right upper quadrant and x-ray of the chest.  Will plan for troponin, and repeat EKG although he does have new poor anterior  R wave progression he does not have any T wave inversions or elevation at this time, no chest pain currently    11:41 PM lab work overall relatively reassuring although lipase is very slightly elevated at 80.  Ultrasound demonstrates evidence of cholelithiasis but no choledocholithiasis, no evidence of obstruction, and he is not having any significant tenderness in this area.  Overall relatively low suspicion that he has cholecystitis or choledocholithiasis, however it is possible that he may have passed a small gallstone which could be contributing to  some of his symptoms.    He is aware of return precautions, and we will discharge him with cardiology follow-up next week, and a surgery referral    I have reviewed the nursing notes. I have reviewed the findings, diagnosis, plan and need for follow up with the patient.    New Prescriptions    No medications on file       Final diagnoses:   Nausea vomiting and diarrhea   Abnormal serum level of lipase   Calculus of gallbladder without cholecystitis without obstruction       Martina Zhu MD  [facility] EMERGENCY DEPARTMENT  4/25/2025     Martina Zhu MD  04/25/25 [id number]

## 2025-04-28 ENCOUNTER — PATIENT OUTREACH (OUTPATIENT)
Dept: CARE COORDINATION | Facility: CLINIC | Age: 55
End: 2025-04-28
Payer: COMMERCIAL

## 2025-04-29 ENCOUNTER — PRE VISIT (OUTPATIENT)
Dept: CARDIOLOGY | Facility: CLINIC | Age: 55
End: 2025-04-29
Payer: COMMERCIAL

## 2025-04-29 ENCOUNTER — TELEPHONE (OUTPATIENT)
Dept: CARDIOLOGY | Facility: CLINIC | Age: 55
End: 2025-04-29
Payer: COMMERCIAL

## 2025-04-29 DIAGNOSIS — I50.22 CHRONIC SYSTOLIC HEART FAILURE (H): Primary | ICD-10-CM

## 2025-04-29 NOTE — TELEPHONE ENCOUNTER
[facility] Call Center    Phone Message    May a detailed message be left on voicemail: yes     Reason for Call: Pt is requesting a call from care team stated last Friday he was in the ER and was taken off one of his medication.  Stated to be having concerns notice an increasing water weight. Please call patient back to further discuss. Thank you     Action Taken: Other: Cardio     Travel Screening: Not Applicable     Date of Service:         Thank you!  [facility]

## 2025-04-29 NOTE — TELEPHONE ENCOUNTER
Called Scott back.  He had been on Abx, and had been taking his PRN Bumex daily.  Was directed to hold Bumex PRN while on abx due to NVD.      His weight is now 241 lb.  States that it should normally be 229 (although also said his weight was 236 recently).  Has now noted shortness of breath while laying down, feeling like his windpipe was going to close.  These symptoms are similar to how he felt when his heart trouble started.      Denies swelling in his ankle but does feel like his shoulders/back/torso are retaining fluid.      He has not taken his PRN Bumex in about a week, but plans to take it now due to fluid retention.  His NVD has resolved since receiving IV fluids in the ER.      Reviewed with Irene Larry NP, will check back with Scott tomorrow after the bumex has time to work.

## 2025-04-30 ENCOUNTER — PATIENT OUTREACH (OUTPATIENT)
Dept: CARE COORDINATION | Facility: CLINIC | Age: 55
End: 2025-04-30
Payer: COMMERCIAL

## 2025-04-30 NOTE — TELEPHONE ENCOUNTER
REFERRAL INFORMATION:  Referring Provider:  Martina Zhu MD   Referring Clinic:  [facility] EMERGENCY DEPT   Reason for Visit/Diagnosis: K80.20 (ICD-10-CM) - Calculus of gallbladder without cholecystitis without obstruction        FUTURE VISIT INFORMATION:  Appointment Date: 5/8/25  Appointment Time:      NOTES RECORD STATUS  DETAILS   OFFICE NOTE from Referring Provider Internal ED 4/25/25   PERTINENT LABS Internal    IMAGING (CT, MRI, US, XR)  Internal 4/25/25-US abdomen     Records Requested    Facility    Fax:    Outcome

## 2025-04-30 NOTE — TELEPHONE ENCOUNTER
Called pt to Follow-up on weight since taking PRN Bumex yesterday.     Pt states he is already feeling better; weight today is 237#, down from 241#. Pt states that he was able to lay flat and sleep comfortably last night and feels like swelling around his torso is improving.     Pt took PRN Bumex again this AM; continuing to have increased urine output.     Pt is nervous about taking too much PRN Bumex as he has just recovered from N/V/D side effects from oral antibiotics. Writer advised pt that his goal weight should be around 233-235# (weight at last clinic appt when he was fairly euvolemic).     Pt has CORE appt with Irene on Monday 5/5 with labs; can make a long term plan at appt.     FRANCOISI to Irene.     Jovita Herrera RN

## 2025-05-05 ENCOUNTER — OFFICE VISIT (OUTPATIENT)
Dept: CARDIOLOGY | Facility: CLINIC | Age: 55
End: 2025-05-05
Attending: CASE MANAGER/CARE COORDINATOR
Payer: COMMERCIAL

## 2025-05-05 ENCOUNTER — LAB (OUTPATIENT)
Dept: LAB | Facility: CLINIC | Age: 55
End: 2025-05-05
Payer: COMMERCIAL

## 2025-05-05 VITALS
DIASTOLIC BLOOD PRESSURE: 75 MMHG | SYSTOLIC BLOOD PRESSURE: 108 MMHG | WEIGHT: 242.1 LBS | BODY MASS INDEX: 31.08 KG/M2 | OXYGEN SATURATION: 98 % | HEART RATE: 91 BPM

## 2025-05-05 DIAGNOSIS — Z95.1 S/P CABG X 4: ICD-10-CM

## 2025-05-05 DIAGNOSIS — I50.22 CHRONIC SYSTOLIC HEART FAILURE (H): ICD-10-CM

## 2025-05-05 DIAGNOSIS — I50.22 CHRONIC SYSTOLIC HEART FAILURE (H): Primary | ICD-10-CM

## 2025-05-05 LAB
ANION GAP SERPL CALCULATED.3IONS-SCNC: 8 MMOL/L (ref 7–15)
BUN SERPL-MCNC: 22.3 MG/DL (ref 6–20)
CALCIUM SERPL-MCNC: 8.9 MG/DL (ref 8.8–10.4)
CHLORIDE SERPL-SCNC: 106 MMOL/L (ref 98–107)
CREAT SERPL-MCNC: 1.31 MG/DL (ref 0.67–1.17)
EGFRCR SERPLBLD CKD-EPI 2021: 64 ML/MIN/1.73M2
GLUCOSE SERPL-MCNC: 154 MG/DL (ref 70–99)
HCO3 SERPL-SCNC: 27 MMOL/L (ref 22–29)
POTASSIUM SERPL-SCNC: 4.5 MMOL/L (ref 3.4–5.3)
SODIUM SERPL-SCNC: 141 MMOL/L (ref 135–145)

## 2025-05-05 PROCEDURE — 99214 OFFICE O/P EST MOD 30 MIN: CPT | Performed by: CASE MANAGER/CARE COORDINATOR

## 2025-05-05 PROCEDURE — 3078F DIAST BP <80 MM HG: CPT | Performed by: CASE MANAGER/CARE COORDINATOR

## 2025-05-05 PROCEDURE — 80048 BASIC METABOLIC PNL TOTAL CA: CPT | Performed by: PATHOLOGY

## 2025-05-05 PROCEDURE — 1126F AMNT PAIN NOTED NONE PRSNT: CPT | Performed by: CASE MANAGER/CARE COORDINATOR

## 2025-05-05 PROCEDURE — 3074F SYST BP LT 130 MM HG: CPT | Performed by: CASE MANAGER/CARE COORDINATOR

## 2025-05-05 PROCEDURE — 99213 OFFICE O/P EST LOW 20 MIN: CPT | Performed by: CASE MANAGER/CARE COORDINATOR

## 2025-05-05 PROCEDURE — 36415 COLL VENOUS BLD VENIPUNCTURE: CPT | Performed by: PATHOLOGY

## 2025-05-05 RX ORDER — LIDOCAINE 40 MG/G
CREAM TOPICAL
OUTPATIENT
Start: 2025-05-05

## 2025-05-05 ASSESSMENT — PAIN SCALES - GENERAL: PAINLEVEL_OUTOF10: NO PAIN (0)

## 2025-05-05 NOTE — NURSING NOTE
Diet: Patient instructed regarding a heart failure healthy diet, including discussion of reduced fat and 2000 mg daily sodium restriction, daily weights, medication purpose and compliance, fluid restrictions and resources for patient and family to access for assistance with heart failure management.       Labs: Patient was given results of the laboratory testing obtained today and patient was instructed about when to return for the next laboratory testing.     Med Reconcile: Reviewed and verified all current medications with the patient. The updated medication list was printed and given to the patient.     Med changes: none    Return Appointment: Patient given instructions regarding scheduling next clinic visit: [facility] before his Dr Cazares appointment in June     Patient stated he understood all health information given and agreed to call with further questions or concerns.     Jinny Sandoval RN

## 2025-05-05 NOTE — Clinical Note
Recent episode of dehydration (likely 2/2 Augmentin, on for osteomyelitis), still requiring EOD Bumex for SOB and edema. We are going to get a RHC to better assess volume & he will see you in June.

## 2025-05-05 NOTE — NURSING NOTE
Chief Complaint   Patient presents with    Follow Up     RETURN CORE     Vitals were taken and medications reconciled.    Lan Tadeo, TITA  7:35 AM

## 2025-05-05 NOTE — PROGRESS NOTES
[facility] Cardiology - [facility] [place]   Cardiology Clinic Note      HPI:   Mr. Scott Donato is a pleasant 55 year old male with medical history pertinent for HFrEF 2/2 ICM (EF 35-40%, ernie 10%), CAD s/p CABG x4 (LIMA to LAD, SVG to D1, SVG to PDA, SVG to OM2, 9/10/24), DM2, and asthma. He presents to cardiology clinic for CORE follow-up.     Interval History 1/13/25  Scott was most recently seen by Dr. Cazares on 12/16/24. This past week, Entresto increased to 49-51mg BID for BP control and Buemx stopped. Today Scott feels more fatigued and foggy headed since the dose increase. Home BP has decreased, now consistently 110/70s. He also feels more bloated; yesterday's weight 235 lbs ->233.9 lbs today. He had a brief episode of dizziness with quick movement.     Interval History 05/05/25  ED visit on 4/25/25 for n/v/d with elevated lipase, U/S showed cholelithiasis. He received IV fluids with improvement in symptoms. Patient is currently on Augmentin for metatarsal osteomyelitis, and this medication is likely causing his GI side effects. He held Bumex after ED visit, with increasing edema & SOB, and was instructed to take daily PRN, with improvement in symptoms.  Currently he has been taking Bumex 1mg EOD with weight 235-237 lbs (home scale). He has increased his PO fluid intake (40 oz gatorade + 40 oz water). Still having bowel movements 2-3 times daily.     PAST MEDICAL HISTORY:  Past Medical History:   Diagnosis Date    Asthma     CAD (coronary artery disease)     Charcot foot due to diabetes mellitus (H)     Chronic kidney disease     DM2 (diabetes mellitus, type 2) (H)     Dyspnea on exertion     Former smoker     Heart failure with reduced ejection fraction, NYHA class III (H)     Hyperlipidemia LDL goal <100     Orthopnea        FAMILY HISTORY:  Family History   Problem Relation Age of Onset    Anesthesia Reaction Mother         Slow to wake    Coronary Artery Disease Maternal Grandfather     Coronary Artery Disease  Paternal Grandmother     Coronary Artery Disease Paternal Grandfather     Lung Cancer Paternal Grandfather     Substance Abuse Son     Alcoholism Maternal Aunt     Arthritis Maternal Aunt     Alcoholism Maternal Uncle     Clotting Disorder No family hx of     Bleeding Disorder No family hx of        SOCIAL HISTORY:  Social History     Socioeconomic History    Marital status: Single   Tobacco Use    Smoking status: Former     Current packs/day: 0.00     Types: Cigarettes     Quit date:      Years since quittin.7    Smokeless tobacco: Never   Substance and Sexual Activity    Alcohol use: Yes     Comment: 2x a year    Drug use: Not Currently     Social Determinants of Health     Financial Resource Strain: Low Risk  (9/15/2024)    Financial Resource Strain     Within the past 12 months, have you or your family members you live with been unable to get utilities (heat, electricity) when it was really needed?: No   Food Insecurity: Low Risk  (9/15/2024)    Food Insecurity     Within the past 12 months, did you worry that your food would run out before you got money to buy more?: No     Within the past 12 months, did the food you bought just not last and you didn t have money to get more?: No   Transportation Needs: Low Risk  (9/15/2024)    Transportation Needs     Within the past 12 months, has lack of transportation kept you from medical appointments, getting your medicines, non-medical meetings or appointments, work, or from getting things that you need?: No   Physical Activity: Not on File (12/15/2023)    Received from [employer]    Physical Activity     Physical Activity: 0   Stress: Not on File (12/15/2023)    Received from [employer]    Stress     Stress: 0   Social Connections: Not on File (2024)    Received from [employer]    Social Connections     Connectedness: 0   Interpersonal Safety: Low Risk  (9/10/2024)    Interpersonal Safety     Do you feel physically and emotionally safe where you currently live?: Yes      Within the past 12 months, have you been hit, slapped, kicked or otherwise physically hurt by someone?: No     Within the past 12 months, have you been humiliated or emotionally abused in other ways by your partner or ex-partner?: No   Housing Stability: High Risk (9/15/2024)    Housing Stability     Do you have housing? : No     Are you worried about losing your housing?: No       CURRENT MEDICATIONS:  Current Outpatient Medications   Medication Sig Dispense Refill    acetaminophen (TYLENOL) 500 MG tablet Take 1.5 tablets (750 mg) by mouth every 6 hours as needed for other (For optimal non-opioid multimodal pain management to improve pain control.).      albuterol (PROAIR HFA/PROVENTIL HFA/VENTOLIN HFA) 108 (90 Base) MCG/ACT inhaler Inhale 2 puffs into the lungs every 4 hours as needed      aspirin 81 MG EC tablet Take 1 tablet by mouth daily.      atorvastatin (LIPITOR) 40 MG tablet Take 40 mg by mouth at bedtime.      budesonide-formoterol (SYMBICORT) 80-4.5 MCG/ACT Inhaler Inhale 2 puffs into the lungs as needed      bumetanide (BUMEX) 1 MG tablet Take 1 mg by mouth as needed (swelling, SOB). (Patient not taking: Reported on 1/13/2025)      Calcium Carbonate-Vitamin D (OYSTER SHELL CALCIUM/D) 500-5 MG-MCG TABS Take 1 tablet by mouth daily      carvedilol (COREG) 3.125 MG tablet Take 2 tablets (6.25 mg) by mouth 2 times daily.      cetirizine (ZYRTEC) 10 MG tablet Take 10 mg by mouth daily      DOXYCYCLINE HYCLATE PO Take 100 mg by mouth every morning      empagliflozin (JARDIANCE) 10 MG TABS tablet Take 1 tablet (10 mg) by mouth daily. 30 tablet 1    EPINEPHrine (ANY BX GENERIC EQUIV) 0.3 MG/0.3ML injection 2-pack Inject 0.3 mg into the muscle as needed for anaphylaxis      eplerenone (INSPRA) 25 MG tablet Take 25 mg by mouth every morning.      methocarbamol (ROBAXIN) 750 MG tablet Take 1 tablet (750 mg) by mouth every 6 hours as needed for muscle spasms or other (pain). 90 tablet 0    oxyCODONE (ROXICODONE) 5  MG tablet Take 1 tablet (5 mg) by mouth every 6 hours as needed for severe pain. 15 tablet 0    OZEMPIC, 2 MG/DOSE, 8 MG/3ML pen Inject 2 mg subcutaneously every 7 days. Fridays  Last dose: 8/31      polyethylene glycol (MIRALAX) 17 GM/Dose powder Take 17 g by mouth daily as needed for constipation. 510 g 0    sacubitril-valsartan (ENTRESTO) 49-51 MG per tablet Take 1 tablet by mouth 2 times daily. 60 tablet 3    senna-docusate (SENOKOT-S/PERICOLACE) 8.6-50 MG tablet Take 2 tablets by mouth or Feeding Tube 2 times daily as needed for constipation. 30 tablet 0    Vitamin D3 50 mcg (2000 units) tablet Take 1 tablet by mouth daily.       No current facility-administered medications for this visit.       ROS:   Refer to HPI    EXAM:  /75 (BP Location: Right arm, Patient Position: Chair, Cuff Size: Adult Large)   Pulse 91   Wt 109.8 kg (242 lb 1.6 oz)   SpO2 98%   BMI 31.08 kg/m    GENERAL: Appears comfortable, in no acute distress.   HEENT: Eye symmetrical, no discharge or icterus bilaterally. Mucous membranes moist and without lesions.  CV: RRR, +S1S2, no murmur, rub, or gallop. JVD at midclavicular line at 45 degrees   RESPIRATORY: Respirations regular, even, and unlabored. Lungs CTA throughout.   GI: Soft and non distended with normoactive bowel sounds present in all quadrants. No tenderness, rebound, guarding.   EXTREMITIES: no peripheral edema. 2+ bilateral pedal pulses.   NEUROLOGIC: Alert and oriented x 3. No focal deficits.   MUSCULOSKELETAL: No joint swelling or tenderness.   SKIN: No jaundice. No rashes or lesions.     Labs, reviewed with patient in clinic today:  CBC RESULTS:  Lab Results   Component Value Date    WBC 6.7 04/25/2025    RBC 5.68 04/25/2025    HGB 15.8 04/25/2025    HCT 48.9 04/25/2025    MCV 86 04/25/2025    MCH 27.8 04/25/2025    MCHC 32.3 04/25/2025    RDW 14.5 04/25/2025     04/25/2025       CMP RESULTS:  Lab Results   Component Value Date     05/05/2025    POTASSIUM  "4.5 2025    POTASSIUM 4.9 09/10/2024    CHLORIDE 106 2025    CO2 27 2025    ANIONGAP 8 2025     (H) 2025     (H) 10/09/2024    BUN 22.3 (H) 2025    CR 1.31 (H) 2025    GFRESTIMATED 64 2025    ANITA 8.9 2025    BILITOTAL 0.6 2025    ALBUMIN 4.6 2025    ALKPHOS 135 2025    ALT 30 2025    AST 31 2025        INR RESULTS:  Lab Results   Component Value Date    INR 1.04 2025       Lab Results   Component Value Date    MAG 2.3 2025     Lab Results   Component Value Date    NTBNPI 543 2025     Lab Results   Component Value Date    NTBNP 387 2025       LIPIDS:  No results for input(s): \"CHOL\", \"HDL\", \"LDL\", \"TRIG\", \"CHOLHDLRATIO\" in the last 39045 hours.    Echocardiogram:  Recent Results (from the past 4320 hour(s))   Echo Complete   Result Value    LVEF  35-40% (moderately reduced)    [facility]    [id number]  [id number]  [id number]  [id number]^YARITZA^FREDDIE     [facility]  Echocardiography Laboratory  [address] [place], [place] [id number]     Name: YAEL MEAD  MRN: [id number]  : 1970  Study Date: 2024 09:39 AM  Age: 54 yrs  Gender: Male  Patient Location: [facility]  Reason For Study: Heart Failure  Ordering Physician: FREDDIE VIGIL  Performed By: Janet Severino RDCS     BSA: 2.4 m2  Height: 74 in  Weight: 243 lb  BP: 114/79 mmHg  ______________________________________________________________________________  Procedure  Echocardiogram with two-dimensional, color and spectral Doppler performed.  Contrast Optison. Optison (NDC #8929-7497-53) given intravenously. Patient was  given 5 ml mixture of 3 ml Optison and 6 ml saline. 4 ml wasted.  ______________________________________________________________________________  Interpretation Summary  Left ventricular function is decreased. The ejection fraction is 35-40%  (moderately reduced).  Mid to apical " anteroseptum is akinetic.  The right ventricle is normal size.  Global right ventricular function is mildly reduced.  The inferior vena cava was normal in size with preserved respiratory  variability.  No significant valvular abnormalities present.  Previous study not available for comparison.  ______________________________________________________________________________  Left Ventricle  Left ventricular wall thickness cannot evaluate. Mild left ventricular  dilation is present. Left ventricular function is decreased. The ejection  fraction is 35-40% (moderately reduced). Left ventricular diastolic function  is not assessable. Mild to moderate diffuse hypokinesis is present. Mid to  apical anteroseptum is akinetic. Inferolateral (posterior) wall hypokinesis is  present.     Right Ventricle  The right ventricle is normal size. Global right ventricular function is  mildly reduced. A right heart catheter is noted in the right ventricle.     Atria  The atria cannot be assessed.     Mitral Valve  The mitral valve is normal.     Aortic Valve  The valve leaflets are not well visualized. On Doppler interrogation, there is  no significant stenosis or regurgitation.     Tricuspid Valve  The tricuspid valve is normal.     Pulmonic Valve  The pulmonic valve is normal.     Vessels  The inferior vena cava was normal in size with preserved respiratory  variability. The aorta root is normal.     Pericardium  No pericardial effusion is present.     Miscellaneous  No significant valvular abnormalities present.     Compared to Previous Study  Previous study not available for comparison.  ______________________________________________________________________________  MMode/2D Measurements & Calculations  IVSd: 1.1 cm  LVIDd: 5.7 cm  LVIDs: 4.8 cm  LVPWd: 0.97 cm  FS: 16.4 %  LV mass(C)d: 230.8 grams  LV mass(C)dI: 97.8 grams/m2  LVOT diam: 2.3 cm  LVOT area: 4.3 cm2  RWT: 0.34      ______________________________________________________________________________  Report approved by: Crystal WORTHINGTON 09/14/2024 11:04 AM             Assessment and Plan:   Mr. Donato is a 55 year old male with a PMH of HFrEF 2/2 ICM (EF 35-40%), CAD s/p CABG x4 (LIMA to LAD, SVG to D1, SVG to PDA, SVG to OM2, 9/10/24), DM2, and asthma.    Recent episode of dehydration likely related to Augmentin, Bumex held and then resumed EOD for worsening SOB and swelling. Difficult to assess volume status as pt has been recently hypovolemic and still experiencing HF symptoms requiring diuretics. We will plan for a RHC to better assess volume status and stage HF.     # Chronic systolic heart failure 2/2 ICM (LVEF 35-40%, ernie 10%)  Stage C NYHA Class II  Fluid status: euvolemic to mildly hypovolemic -- continue Bumex 1mg PO every other day PRN for swelling, weight gain, SOB  ACEi/ARB  Entresto 49-51mg BID  BB: carvedilol 6.25mg BID  SGLT2i: Jardiance 10mg daily   Aldosterone antagonist: eplerenone 25mg daily  SCD prophylaxis decision deferred during medication uptitration  % BiV pacing: N/A  NSAIDS: contraindicated, discussed with patient   - RHC for heart failure staging    # Coronary artery disease s/p CABG x4 (LIMA to LAD, SVG to D1, SVG to PDA, SVG to OM2, 9/10/24)  - aspirin 162mg daily  - continue atorvastatin 40mg daily    # CKD II  Baseline creatinine 1.1-1.3  - continue Bumex 1mg PO every other day PRN for swelling, weight gain, SOB    Follow up: 1 month w/ Dr Cazares    Chart review time today: 10 minutes  Visit time today: 20 minutes  Total time spent today: 30 minutes    Irene Larry CNP  [facility]   05/05/25

## 2025-05-05 NOTE — LETTER
5/5/2025      RE: Scott Donato  [address]  [address]  [facility] [address]       Dear Colleague,    Thank you for the opportunity to participate in the care of your patient, Scott Donato, at the [facility] [place] at [facility]. Please see a copy of my visit note below.      [facility] [place]      HPI:   Mr. Scott Donato is a pleasant 55 year old male with medical history pertinent for HFrEF 2/2 ICM (EF 35-40%, ernie 10%), CAD s/p CABG x4 (LIMA to LAD, SVG to D1, SVG to PDA, SVG to OM2, 9/10/24), DM2, and asthma. He presents to cardiology clinic for CORE follow-up.     Interval History 1/13/25  Scott was most recently seen by Dr. Cazares on 12/16/24. This past week, Entresto increased to 49-51mg BID for BP control and Buemx stopped. Today Scott feels more fatigued and foggy headed since the dose increase. Home BP has decreased, now consistently 110/70s. He also feels more bloated; yesterday's weight 235 lbs ->233.9 lbs today. He had a brief episode of dizziness with quick movement.     Interval History 05/05/25  ED visit on 4/25/25 for n/v/d with elevated lipase, U/S showed cholelithiasis. He received IV fluids with improvement in symptoms. Patient is currently on Augmentin for metatarsal osteomyelitis, and this medication is likely causing his GI side effects. He held Bumex after ED visit, with increasing edema & SOB, and was instructed to take daily PRN, with improvement in symptoms.  Currently he has been taking Bumex 1mg EOD with weight 235-237 lbs (home scale). He has increased his PO fluid intake (40 oz gatorade + 40 oz water). Still having bowel movements 2-3 times daily.     PAST MEDICAL HISTORY:  Past Medical History:   Diagnosis Date     Asthma      CAD (coronary artery disease)      Charcot foot due to diabetes mellitus (H)      Chronic kidney disease      DM2 (diabetes mellitus, type 2) (H)       Dyspnea on exertion      Former smoker      Heart failure with reduced ejection fraction, NYHA class III (H)      Hyperlipidemia LDL goal <100      Orthopnea        FAMILY HISTORY:  Family History   Problem Relation Age of Onset     Anesthesia Reaction Mother         Slow to wake     Coronary Artery Disease Maternal Grandfather      Coronary Artery Disease Paternal Grandmother      Coronary Artery Disease Paternal Grandfather      Lung Cancer Paternal Grandfather      Substance Abuse Son      Alcoholism Maternal Aunt      Arthritis Maternal Aunt      Alcoholism Maternal Uncle      Clotting Disorder No family hx of      Bleeding Disorder No family hx of        SOCIAL HISTORY:  Social History     Socioeconomic History     Marital status: Single   Tobacco Use     Smoking status: Former     Current packs/day: 0.00     Types: Cigarettes     Quit date:      Years since quittin.7     Smokeless tobacco: Never   Substance and Sexual Activity     Alcohol use: Yes     Comment: 2x a year     Drug use: Not Currently     Social Determinants of Health     Financial Resource Strain: Low Risk  (9/15/2024)    Financial Resource Strain      Within the past 12 months, have you or your family members you live with been unable to get utilities (heat, electricity) when it was really needed?: No   Food Insecurity: Low Risk  (9/15/2024)    Food Insecurity      Within the past 12 months, did you worry that your food would run out before you got money to buy more?: No      Within the past 12 months, did the food you bought just not last and you didn t have money to get more?: No   Transportation Needs: Low Risk  (9/15/2024)    Transportation Needs      Within the past 12 months, has lack of transportation kept you from medical appointments, getting your medicines, non-medical meetings or appointments, work, or from getting things that you need?: No   Physical Activity: Not on File (12/15/2023)    Received from [employer]  Activity      Physical Activity: 0   Stress: Not on File (12/15/2023)    Received from [facility]    Stress      Stress: 0   Social Connections: Not on File (9/13/2024)    Received from [facility]    Social Connections      Connectedness: 0   Interpersonal Safety: Low Risk  (9/10/2024)    Interpersonal Safety      Do you feel physically and emotionally safe where you currently live?: Yes      Within the past 12 months, have you been hit, slapped, kicked or otherwise physically hurt by someone?: No      Within the past 12 months, have you been humiliated or emotionally abused in other ways by your partner or ex-partner?: No   Housing Stability: High Risk (9/15/2024)    Housing Stability      Do you have housing? : No      Are you worried about losing your housing?: No       CURRENT MEDICATIONS:  Current Outpatient Medications   Medication Sig Dispense Refill     acetaminophen (TYLENOL) 500 MG tablet Take 1.5 tablets (750 mg) by mouth every 6 hours as needed for other (For optimal non-opioid multimodal pain management to improve pain control.).       albuterol (PROAIR HFA/PROVENTIL HFA/VENTOLIN HFA) 108 (90 Base) MCG/ACT inhaler Inhale 2 puffs into the lungs every 4 hours as needed       aspirin 81 MG EC tablet Take 1 tablet by mouth daily.       atorvastatin (LIPITOR) 40 MG tablet Take 40 mg by mouth at bedtime.       budesonide-formoterol (SYMBICORT) 80-4.5 MCG/ACT Inhaler Inhale 2 puffs into the lungs as needed       bumetanide (BUMEX) 1 MG tablet Take 1 mg by mouth as needed (swelling, SOB). (Patient not taking: Reported on 1/13/2025)       Calcium Carbonate-Vitamin D (OYSTER SHELL CALCIUM/D) 500-5 MG-MCG TABS Take 1 tablet by mouth daily       carvedilol (COREG) 3.125 MG tablet Take 2 tablets (6.25 mg) by mouth 2 times daily.       cetirizine (ZYRTEC) 10 MG tablet Take 10 mg by mouth daily       DOXYCYCLINE HYCLATE PO Take 100 mg by mouth every morning       empagliflozin (JARDIANCE) 10 MG TABS tablet Take 1 tablet (10 mg)  by mouth daily. 30 tablet 1     EPINEPHrine (ANY BX GENERIC EQUIV) 0.3 MG/0.3ML injection 2-pack Inject 0.3 mg into the muscle as needed for anaphylaxis       eplerenone (INSPRA) 25 MG tablet Take 25 mg by mouth every morning.       methocarbamol (ROBAXIN) 750 MG tablet Take 1 tablet (750 mg) by mouth every 6 hours as needed for muscle spasms or other (pain). 90 tablet 0     oxyCODONE (ROXICODONE) 5 MG tablet Take 1 tablet (5 mg) by mouth every 6 hours as needed for severe pain. 15 tablet 0     OZEMPIC, 2 MG/DOSE, 8 MG/3ML pen Inject 2 mg subcutaneously every 7 days. Fridays  Last dose: 8/31       polyethylene glycol (MIRALAX) 17 GM/Dose powder Take 17 g by mouth daily as needed for constipation. 510 g 0     sacubitril-valsartan (ENTRESTO) 49-51 MG per tablet Take 1 tablet by mouth 2 times daily. 60 tablet 3     senna-docusate (SENOKOT-S/PERICOLACE) 8.6-50 MG tablet Take 2 tablets by mouth or Feeding Tube 2 times daily as needed for constipation. 30 tablet 0     Vitamin D3 50 mcg (2000 units) tablet Take 1 tablet by mouth daily.       No current facility-administered medications for this visit.       ROS:   Refer to HPI    EXAM:  /75 (BP Location: Right arm, Patient Position: Chair, Cuff Size: Adult Large)   Pulse 91   Wt 109.8 kg (242 lb 1.6 oz)   SpO2 98%   BMI 31.08 kg/m    GENERAL: Appears comfortable, in no acute distress.   HEENT: Eye symmetrical, no discharge or icterus bilaterally. Mucous membranes moist and without lesions.  CV: RRR, +S1S2, no murmur, rub, or gallop. JVD at midclavicular line at 45 degrees   RESPIRATORY: Respirations regular, even, and unlabored. Lungs CTA throughout.   GI: Soft and non distended with normoactive bowel sounds present in all quadrants. No tenderness, rebound, guarding.   EXTREMITIES: no peripheral edema. 2+ bilateral pedal pulses.   NEUROLOGIC: Alert and oriented x 3. No focal deficits.   MUSCULOSKELETAL: No joint swelling or tenderness.   SKIN: No jaundice. No  "rashes or lesions.     Labs, reviewed with patient in clinic today:  CBC RESULTS:  Lab Results   Component Value Date    WBC 6.7 2025    RBC 5.68 2025    HGB 15.8 2025    HCT 48.9 2025    MCV 86 2025    MCH 27.8 2025    MCHC 32.3 2025    RDW 14.5 2025     2025       CMP RESULTS:  Lab Results   Component Value Date     2025    POTASSIUM 4.5 2025    POTASSIUM 4.9 09/10/2024    CHLORIDE 106 2025    CO2 27 2025    ANIONGAP 8 2025     (H) 2025     (H) 10/09/2024    BUN 22.3 (H) 2025    CR 1.31 (H) 2025    GFRESTIMATED 64 2025    ANITA 8.9 2025    BILITOTAL 0.6 2025    ALBUMIN 4.6 2025    ALKPHOS 135 2025    ALT 30 2025    AST 31 2025        INR RESULTS:  Lab Results   Component Value Date    INR 1.04 2025       Lab Results   Component Value Date    MAG 2.3 2025     Lab Results   Component Value Date    NTBNPI 543 2025     Lab Results   Component Value Date    NTBNP 387 2025       LIPIDS:  No results for input(s): \"CHOL\", \"HDL\", \"LDL\", \"TRIG\", \"CHOLHDLRATIO\" in the last 09888 hours.    Echocardiogram:  Recent Results (from the past 4320 hour(s))   Echo Complete   Result Value    LVEF  35-40% (moderately reduced)    [facility]    [id number]  [id number]  [id number]  [id number]^YARITZA^FREDDIE     [facility]  Echocardiography Laboratory  [address] [place] [id number]     Name: YAEL MEAD  MRN: [id number]  : 1970  Study Date: 2024 09:39 AM  Age: 54 yrs  Gender: Male  Patient Location: [facility]  Reason For Study: Heart Failure  Ordering Physician: FREDDIE VIGIL  Performed By: Janet Severino RDCS     BSA: 2.4 m2  Height: 74 in  Weight: 243 lb  BP: 114/79 mmHg  ______________________________________________________________________________  Procedure  Echocardiogram with " two-dimensional, color and spectral Doppler performed.  Contrast Optison. Optison (NDC #3149-9784-20) given intravenously. Patient was  given 5 ml mixture of 3 ml Optison and 6 ml saline. 4 ml wasted.  ______________________________________________________________________________  Interpretation Summary  Left ventricular function is decreased. The ejection fraction is 35-40%  (moderately reduced).  Mid to apical anteroseptum is akinetic.  The right ventricle is normal size.  Global right ventricular function is mildly reduced.  The inferior vena cava was normal in size with preserved respiratory  variability.  No significant valvular abnormalities present.  Previous study not available for comparison.  ______________________________________________________________________________  Left Ventricle  Left ventricular wall thickness cannot evaluate. Mild left ventricular  dilation is present. Left ventricular function is decreased. The ejection  fraction is 35-40% (moderately reduced). Left ventricular diastolic function  is not assessable. Mild to moderate diffuse hypokinesis is present. Mid to  apical anteroseptum is akinetic. Inferolateral (posterior) wall hypokinesis is  present.     Right Ventricle  The right ventricle is normal size. Global right ventricular function is  mildly reduced. A right heart catheter is noted in the right ventricle.     Atria  The atria cannot be assessed.     Mitral Valve  The mitral valve is normal.     Aortic Valve  The valve leaflets are not well visualized. On Doppler interrogation, there is  no significant stenosis or regurgitation.     Tricuspid Valve  The tricuspid valve is normal.     Pulmonic Valve  The pulmonic valve is normal.     Vessels  The inferior vena cava was normal in size with preserved respiratory  variability. The aorta root is normal.     Pericardium  No pericardial effusion is present.     Miscellaneous  No significant valvular abnormalities present.     Compared  to Previous Study  Previous study not available for comparison.  ______________________________________________________________________________  MMode/2D Measurements & Calculations  IVSd: 1.1 cm  LVIDd: 5.7 cm  LVIDs: 4.8 cm  LVPWd: 0.97 cm  FS: 16.4 %  LV mass(C)d: 230.8 grams  LV mass(C)dI: 97.8 grams/m2  LVOT diam: 2.3 cm  LVOT area: 4.3 cm2  RWT: 0.34     ______________________________________________________________________________  Report approved by: Crystal WORTHINGTON 09/14/2024 11:04 AM             Assessment and Plan:   Mr. Donato is a 55 year old male with a PMH of HFrEF 2/2 ICM (EF 35-40%), CAD s/p CABG x4 (LIMA to LAD, SVG to D1, SVG to PDA, SVG to OM2, 9/10/24), DM2, and asthma.    Recent episode of dehydration likely related to Augmentin, Bumex held and then resumed EOD for worsening SOB and swelling. Difficult to assess volume status as pt has been recently hypovolemic and still experiencing HF symptoms requiring diuretics. We will plan for a RHC to better assess volume status and stage HF.     # Chronic systolic heart failure 2/2 ICM (LVEF 35-40%, ernie 10%)  Stage C NYHA Class II  Fluid status: euvolemic to mildly hypovolemic -- continue Bumex 1mg PO every other day PRN for swelling, weight gain, SOB  ACEi/ARB  Entresto 49-51mg BID  BB: carvedilol 6.25mg BID  SGLT2i: Jardiance 10mg daily   Aldosterone antagonist: eplerenone 25mg daily  SCD prophylaxis decision deferred during medication uptitration  % BiV pacing: N/A  NSAIDS: contraindicated, discussed with patient   - [facility] for heart failure staging    # Coronary artery disease s/p CABG x4 (LIMA to LAD, SVG to D1, SVG to PDA, SVG to OM2, 9/10/24)  - aspirin 162mg daily  - continue atorvastatin 40mg daily    # CKD II  Baseline creatinine 1.1-1.3  - continue Bumex 1mg PO every other day PRN for swelling, weight gain, SOB    Follow up: 1 month w/ Dr Cazares    Chart review time today: 10 minutes  Visit time today: 20 minutes  Total time spent today: 30  minutes    Irene Larry CNP  [facility]   05/05/25          Please do not hesitate to contact me if you have any questions/concerns.     Sincerely,     TRACY DE LEON CNP

## 2025-05-05 NOTE — PATIENT INSTRUCTIONS
Take your medicines every day, as directed    Changes made today:  Right heart catheterization before seeing Dr Cazares in June  Bumex as needed (no more frequently than every other day until Augmentin is completed)   Monitor Your Weight and Symptoms    Contact us if you:    Gain 2 pounds in one day or 5 pounds in one week  Feel more short of breath  Notice more leg swelling  Feel lightheadeded   Change your lifestyle    Limit Salt or Sodium:  2000 mg  Limit Fluids:  2000 mL or approximately 64 ounces  Eat a Heart Healthy Diet  Low in saturated fats  Stay Active:  Aim to move at least 150 minutes every  week         To Contact us    During Business Hours:  [phone], option # 1      After hours, weekends or holidays:   [phone], Option #4  Ask to speak to the On-Call Cardiologist. Inform them you are a CORE/heart failure patient at the [facility].     [employer] allows you to communicate directly with your heart team through secure messaging.  [employer] can be accessed any time on your phone, computer, or tablet.  If you need assistance, we'd be happy to help!         Keep your Heart Appointments:    - Dr. Cazares in June      Pre-procedure instructions - Right Heart Cath     Your arrival time is TBD.  Location is [address] Waiting Room  Please plan on being at the hospital all day. If you are on dialysis, DO NOT schedule on a dialysis day.  At any time, emergencies and/or urgent cases may come up which could delay the start of your procedure.    Pre-procedure instructions - Right heart catheterization  No solid food for 8 hours prior  Nothing to drink 2 hours prior to arrival time  You can take your morning medications (except diabetic and blood thinners) with sips of water  We recommend you arrange for a ride to drop you off and pick you up, in the instance, you are unable to drive home, however you should  be able to function as you normally would after the procedure     Diabetic Medication Instructions  Hold oral diabetic medication in morning of your procedure and for 48 hours after IV contrast is given  Typical instructions for insulin diabetic medication holding are below. However, please reach out to your Primary Care Provider or Endocrinologist for specific instructions  DO NOT take any oral diabetic medication, short-acting diabetes medications/insulin, humalog or regular insulin the morning of your test  Take   dose of long-acting insulin (Lantus, Levemir) the day of your test  Remember to bring your glucometer and insulin with you to take after your test if needed  GLP-1 Agonists Instructions  DO NOT take injectable GLP-1 agonists semaglutide (Ozempic, Wegovy), dulaglutide (Trulicity), exenatide ER (Bydureon), tirzepatide (Mounjaro), or oral semaglutide (Rybelsus) for 7 days prior your procedure  Hold once daily injectable GLP-1 agonists exenatide (Byetta), liraglutide (Saxenda, Victoza), lixisenatide (Soligua) the day before and day of your procedure                Anticoagulation Medication Instructions   NA  Nurse to write N/A if not currently taking    If you need help scheduling or rescheduling your appointment, please call [phone]

## 2025-05-06 ENCOUNTER — TELEPHONE (OUTPATIENT)
Dept: CARDIOLOGY | Facility: CLINIC | Age: 55
End: 2025-05-06
Payer: COMMERCIAL

## 2025-05-06 NOTE — TELEPHONE ENCOUNTER
Patient Contacted for the patient to call back and schedule the following:    Appointment type: [facility]  Provider: BILLY   Return date: 06/10/25  Specialty phone number: [phone] OPT 1   Additional appointment(s) needed: N/A   Additonal Notes: N/A

## 2025-05-06 NOTE — TELEPHONE ENCOUNTER
Cath Lab Case Request/Order    Location: [address] [address] [facility] Waiting Room    Procedure: Right Heart Cath (RHC)    Procedure Date: 06/10/25    Patient Arrival Time: 6:30AM     Procedure Time: 0830 (pending emergency)    Ordering Provider:  ANNETTA    Performing Cardiologist: Dr. Yajaira Gates    Inpatient Bed Needed: No    Post-  Procedure NANDINI appointment scheduled (1 - 2 weeks): YES     Date: 06/23/25     Provider: ANNETTA    Communicated Patient Instructions:     NPO, nothing to eat 8 hours and drink 2 hours before arrival time: No     , need to arrange a ride home - unable to drive post- procedure: No     Adult at home, need a responsible adult to stay with patient 24 hours post- procedure: No    Appointment was scheduled: Face to Face    Patient expressed understanding of above instructions and denied further questions at this time.    Sheeba Sweet

## 2025-05-08 ENCOUNTER — PRE VISIT (OUTPATIENT)
Dept: SURGERY | Facility: CLINIC | Age: 55
End: 2025-05-08

## 2025-06-10 ENCOUNTER — HOSPITAL ENCOUNTER (OUTPATIENT)
Facility: CLINIC | Age: 55
Discharge: HOME | End: 2025-06-10
Attending: INTERNAL MEDICINE | Admitting: INTERNAL MEDICINE
Payer: COMMERCIAL

## 2025-06-10 VITALS
OXYGEN SATURATION: 99 % | HEART RATE: 77 BPM | TEMPERATURE: 97.9 F | BODY MASS INDEX: 30.27 KG/M2 | WEIGHT: 235.9 LBS | HEIGHT: 74 IN | SYSTOLIC BLOOD PRESSURE: 121 MMHG | RESPIRATION RATE: 16 BRPM | DIASTOLIC BLOOD PRESSURE: 86 MMHG

## 2025-06-10 DIAGNOSIS — I50.22 CHRONIC SYSTOLIC HEART FAILURE (H): ICD-10-CM

## 2025-06-10 LAB
ANION GAP SERPL CALCULATED.3IONS-SCNC: 11 MMOL/L (ref 7–15)
BUN SERPL-MCNC: 24.9 MG/DL (ref 6–20)
CALCIUM SERPL-MCNC: 9 MG/DL (ref 8.8–10.4)
CHLORIDE SERPL-SCNC: 102 MMOL/L (ref 98–107)
CREAT SERPL-MCNC: 1.34 MG/DL (ref 0.67–1.17)
EGFRCR SERPLBLD CKD-EPI 2021: 62 ML/MIN/1.73M2
ERYTHROCYTE [DISTWIDTH] IN BLOOD BY AUTOMATED COUNT: 14.8 % (ref 10–15)
GLUCOSE SERPL-MCNC: 149 MG/DL (ref 70–99)
HCO3 SERPL-SCNC: 25 MMOL/L (ref 22–29)
HCT VFR BLD AUTO: 49.6 % (ref 40–53)
HGB BLD-MCNC: 16.2 G/DL (ref 13.3–17.7)
HGB BLD-MCNC: 16.3 G/DL (ref 13.3–17.7)
MCH RBC QN AUTO: 28.4 PG (ref 26.5–33)
MCHC RBC AUTO-ENTMCNC: 32.7 G/DL (ref 31.5–36.5)
MCV RBC AUTO: 87 FL (ref 78–100)
OXYHGB MFR BLDV: 66 % (ref 70–75)
PLATELET # BLD AUTO: 162 10E3/UL (ref 150–450)
POTASSIUM SERPL-SCNC: 4.4 MMOL/L (ref 3.4–5.3)
RBC # BLD AUTO: 5.71 10E6/UL (ref 4.4–5.9)
SODIUM SERPL-SCNC: 138 MMOL/L (ref 135–145)
WBC # BLD AUTO: 6.5 10E3/UL (ref 4–11)

## 2025-06-10 PROCEDURE — 82810 BLOOD GASES O2 SAT ONLY: CPT

## 2025-06-10 PROCEDURE — C1894 INTRO/SHEATH, NON-LASER: HCPCS | Performed by: INTERNAL MEDICINE

## 2025-06-10 PROCEDURE — 85018 HEMOGLOBIN: CPT | Performed by: CASE MANAGER/CARE COORDINATOR

## 2025-06-10 PROCEDURE — 36415 COLL VENOUS BLD VENIPUNCTURE: CPT | Performed by: CASE MANAGER/CARE COORDINATOR

## 2025-06-10 PROCEDURE — 250N000009 HC RX 250: Performed by: INTERNAL MEDICINE

## 2025-06-10 PROCEDURE — C1751 CATH, INF, PER/CENT/MIDLINE: HCPCS | Performed by: INTERNAL MEDICINE

## 2025-06-10 PROCEDURE — 80048 BASIC METABOLIC PNL TOTAL CA: CPT | Performed by: INTERNAL MEDICINE

## 2025-06-10 PROCEDURE — 93451 RIGHT HEART CATH: CPT | Performed by: INTERNAL MEDICINE

## 2025-06-10 PROCEDURE — 85018 HEMOGLOBIN: CPT

## 2025-06-10 PROCEDURE — 93451 RIGHT HEART CATH: CPT | Mod: 26 | Performed by: INTERNAL MEDICINE

## 2025-06-10 PROCEDURE — 272N000001 HC OR GENERAL SUPPLY STERILE: Performed by: INTERNAL MEDICINE

## 2025-06-10 PROCEDURE — 250N000009 HC RX 250: Performed by: CASE MANAGER/CARE COORDINATOR

## 2025-06-10 RX ORDER — LIDOCAINE 40 MG/G
CREAM TOPICAL
Status: COMPLETED | OUTPATIENT
Start: 2025-06-10 | End: 2025-06-10

## 2025-06-10 RX ADMIN — LIDOCAINE: 40 CREAM TOPICAL at 07:36

## 2025-06-10 ASSESSMENT — ACTIVITIES OF DAILY LIVING (ADL)
ADLS_ACUITY_SCORE: 56

## 2025-06-10 NOTE — Clinical Note
dry, intact, no bleeding and no hematoma. The left internal jugular vein 7 FR sheath is removed to a manual hold and to a Primapore DSG.

## 2025-06-10 NOTE — PROGRESS NOTES
Pt returned to 2A following right heart catheterization. L internal jugular site covered with primapore, CDI, soft, flat, no hematoma. Discharge instructions given to pt, verbalizes understanding. Pt eating and drinking. Pt will ambulate to lobby to discharge to home.

## 2025-06-10 NOTE — PRE-PROCEDURE
Consenting/Education for Cardiology Procedure: Right heart catheterization  and possible vasodilator study    Patient understands we would like to perform the listed procedure(s) due to advanced HF.    The patient understands the following:     The procedure was described to the patient in detail.    No sedation is planned for this procedure. Patient understands risks and complications of the procedure which include but are not limited to bruising/swelling around the incision site, infection, bleeding, allergic reaction to local anesthetic, air embolism, arterial puncture, stroke, heart attack, need for emergency heart surgery, death.       Patient verbalized understanding of risks and benefits and has elected to proceed with the procedure or procedures listed above.    TRACY Emmanuel CNP  Cardiology

## 2025-06-10 NOTE — Clinical Note
Report is called to 2A RNWayne. The procedure and findings are reviewed. The pt is returned to 2A per AMB in stable condition.

## 2025-06-10 NOTE — PROGRESS NOTES
Pt arrived to 2A for right heart catheterization. VSS, denies pain. Labs pending. Consent signed. Pt prepped.

## 2025-06-10 NOTE — DISCHARGE INSTRUCTIONS
[facility]                        Interventional Cardiology  Discharge Instructions   Post Right Heart Cath      AFTER YOU GO HOME:  Take it easy for the rest of the day: Do not do strenuous exercise and do not lift, pull, or push anything heavy.  For at least 24 hours, keep the bandage over the spot where the catheter was inserted.   You may shower 24 hours after the procedure. Do not scrub the procedure site vigorously. Pat the incision dry. Do not submerge the site (ie bath, pool) for 48 hours  No lotion or powder to the puncture site for 3 days  You may put an ice or a cold pack on the area for 10 to 20 minutes at a time to help with soreness or swelling.   Resume your regular diet and medications unless otherwise instructed by your Primary Physician    CALL THE PHYSICIAN IF  If you have a fast-growing, painful lump at the catheter site.  If you have symptoms of infection, such as: Increased pain, swelling, warmth, redness, red streaks leading from the area, pus draining from the area, and/or a fever.    ADDITIONAL INSTRUCTIONS: Monitor neck site for bleeding, swelling, or voice changes. If you notice bleeding or swelling immediately apply pressure to the site for 15 minutes, and call number below to speak with Cardiology Fellow.  If you experience any changes in your breathing you should call your doctor immediately or come to the closest Emergency Department.  Do not drive yourself    MEDICATIONS: You are to resume all home medications including anticoagulation therapy unless otherwise advised by your primary cardiologist or nurse coordinator.    Follow Up: Per your primary cardiology team    If you have any questions or concerns regarding your procedure site please call [phone] at any time & press option 4 to speak to the .  Ask for the Cardiology Fellow on call.  They are available 24 hours a day.  You may also contact the Cardiology Clinic after hours number at  [phone].                                                       Telephone Numbers [phone] Monday-Friday 8:00 am to 4:30 pm    [phone] After 4:30 pm Monday-Friday, Weekends & Holidays  Ask for Interventional Cardiologist on call. Someone is on call 24 hours/day   [facility] toll free number [phone] Monday-Friday 8:00 am to 4:30 pm   [facility] Emergency Dept [phone]

## 2025-06-12 DIAGNOSIS — I50.22 CHRONIC SYSTOLIC HEART FAILURE (H): Primary | ICD-10-CM

## 2025-06-23 ENCOUNTER — TELEPHONE (OUTPATIENT)
Dept: CARDIOLOGY | Facility: CLINIC | Age: 55
End: 2025-06-23

## 2025-06-23 ENCOUNTER — LAB (OUTPATIENT)
Dept: LAB | Facility: CLINIC | Age: 55
End: 2025-06-23
Attending: INTERNAL MEDICINE
Payer: COMMERCIAL

## 2025-06-23 ENCOUNTER — OFFICE VISIT (OUTPATIENT)
Dept: CARDIOLOGY | Facility: CLINIC | Age: 55
End: 2025-06-23
Attending: INTERNAL MEDICINE
Payer: COMMERCIAL

## 2025-06-23 VITALS
DIASTOLIC BLOOD PRESSURE: 71 MMHG | HEART RATE: 98 BPM | SYSTOLIC BLOOD PRESSURE: 103 MMHG | WEIGHT: 239.7 LBS | OXYGEN SATURATION: 98 % | BODY MASS INDEX: 30.78 KG/M2

## 2025-06-23 DIAGNOSIS — I25.5 ISCHEMIC CARDIOMYOPATHY: ICD-10-CM

## 2025-06-23 DIAGNOSIS — E78.2 MIXED HYPERLIPIDEMIA: ICD-10-CM

## 2025-06-23 DIAGNOSIS — I50.42 CHRONIC COMBINED SYSTOLIC (CONGESTIVE) AND DIASTOLIC (CONGESTIVE) HEART FAILURE (H): Primary | ICD-10-CM

## 2025-06-23 DIAGNOSIS — Z95.1 S/P CABG X 4: ICD-10-CM

## 2025-06-23 DIAGNOSIS — I50.22 CHRONIC SYSTOLIC HEART FAILURE (H): ICD-10-CM

## 2025-06-23 LAB
ANION GAP SERPL CALCULATED.3IONS-SCNC: 10 MMOL/L (ref 7–15)
BUN SERPL-MCNC: 18.1 MG/DL (ref 6–20)
CALCIUM SERPL-MCNC: 9.1 MG/DL (ref 8.8–10.4)
CHLORIDE SERPL-SCNC: 103 MMOL/L (ref 98–107)
CREAT SERPL-MCNC: 1.31 MG/DL (ref 0.67–1.17)
EGFRCR SERPLBLD CKD-EPI 2021: 64 ML/MIN/1.73M2
GLUCOSE SERPL-MCNC: 173 MG/DL (ref 70–99)
HCO3 SERPL-SCNC: 26 MMOL/L (ref 22–29)
MAGNESIUM SERPL-MCNC: 2.1 MG/DL (ref 1.7–2.3)
NT-PROBNP SERPL-MCNC: 211 PG/ML (ref 0–177)
POTASSIUM SERPL-SCNC: 4.5 MMOL/L (ref 3.4–5.3)
SODIUM SERPL-SCNC: 139 MMOL/L (ref 135–145)

## 2025-06-23 PROCEDURE — 3078F DIAST BP <80 MM HG: CPT | Performed by: INTERNAL MEDICINE

## 2025-06-23 PROCEDURE — 80048 BASIC METABOLIC PNL TOTAL CA: CPT | Performed by: PATHOLOGY

## 2025-06-23 PROCEDURE — 36415 COLL VENOUS BLD VENIPUNCTURE: CPT | Performed by: PATHOLOGY

## 2025-06-23 PROCEDURE — G2211 COMPLEX E/M VISIT ADD ON: HCPCS | Performed by: INTERNAL MEDICINE

## 2025-06-23 PROCEDURE — 99215 OFFICE O/P EST HI 40 MIN: CPT | Performed by: INTERNAL MEDICINE

## 2025-06-23 PROCEDURE — 83880 ASSAY OF NATRIURETIC PEPTIDE: CPT | Performed by: PATHOLOGY

## 2025-06-23 PROCEDURE — 99213 OFFICE O/P EST LOW 20 MIN: CPT | Performed by: INTERNAL MEDICINE

## 2025-06-23 PROCEDURE — 83735 ASSAY OF MAGNESIUM: CPT | Performed by: PATHOLOGY

## 2025-06-23 PROCEDURE — 3074F SYST BP LT 130 MM HG: CPT | Performed by: INTERNAL MEDICINE

## 2025-06-23 PROCEDURE — 1126F AMNT PAIN NOTED NONE PRSNT: CPT | Performed by: INTERNAL MEDICINE

## 2025-06-23 ASSESSMENT — PAIN SCALES - GENERAL: PAINLEVEL_OUTOF10: NO PAIN (0)

## 2025-06-23 NOTE — TELEPHONE ENCOUNTER
Patient Contacted for the patient to call back and schedule the following:    Appointment type: Return Heart Failuew  Provider: Sage  Return date: 06/12  Specialty phone number: [phone] opt 1  Additional appointment(s) needed: Labs  Additonal Notes: NA

## 2025-06-23 NOTE — LETTER
2025      RE: Scott Donato  [address]  [facility] [address]       Dear Colleague,    Thank you for the opportunity to participate in the care of your patient, Scott Donato, at the [facility] [place] [facility]. Please see a copy of my visit note below.    [facility]  Advanced Heart Failure Clinic    Patient Name: Scott Donato   : 1970   Date of Visit: 2025    Primary Care Physician: Fran Irwin MD     Referring Physician: TRACY Munoz  Reason for Visit: ischemic cardiomyopathy    Dear TRACY Munoz and Dr. Irwin,     I had the pleasure of seeing Scott Donato today in the [facility] Advanced Heart Failure Clinic. As you know Scott Donato is a pleasant 54 year old year old male, who presents today for further evaluation of ischemic cardiomyopathy.    Scott has a history of HFrEF 2/2 ICM (EF 35-40%, ernie 10%), CAD s/p CABG x4 (LIMA to LAD, SVG to D1, SVG to PDA, SVG to OM2, 9/10/24), DM2, and asthma     He established HF care with me in 2024.  Today, he presents for follow up.    Since last visit, he has been seen in [facility] clinic with increase in GDMT doses. He had an ER visit in 2025 for n/v/d with elevated lipase, U/S showed cholelithiasis. He received IV fluids with improvement in symptoms. He is on augmentin for metatarsal osteomyelitis. His bumex dose has been adjusted for symptoms of volume retention. He was referred for a RHC which was completed recently.    Today he reports feeling well. He works full time at [employer], working 40 hours a week. He works 4 hours and has to rest for 4 hours due to osteomyelitis related pain in his legs. He is scheduled for surgery next month on left leg - 5th metatarsal (Charcoat's). He is hoping he may avoid right leg surgery but is being monitored for it.     He has not had any recent  shortness of breath. He recently moved and and that day was exhausting. He is now slowly unpacking. His symptoms of bloated-ness is primarily abdominal distension.  For this he takes Bumex as needed 1 mg sometimes up to 2 doses in a day and up to 3-4 times per week.  He denies chest pain, PND/orthopnea, palpitations, lightheadedness, syncope, leg swelling.  Compliant with medications and notes no problems taking them but sometimes feels he experiences for an interaction with augmentin and bumex (nausea), although this is improved.      Home BPs - 100s-120s/ 70s-80s  Home HRs - not checking  Home weights - he believes his goal is ~231-234 lbs, and takes bumex for weight gain or swelling or abdominal bloating    ROS:  A complete 10-point ROS was negative except as above.    PAST MEDICAL HISTORY:  Past Medical History:   Diagnosis Date     Asthma      CAD (coronary artery disease)      Charcot foot due to diabetes mellitus (H)      Chronic kidney disease      DM2 (diabetes mellitus, type 2) (H)      Dyspnea on exertion      Former smoker      Heart failure with reduced ejection fraction, NYHA class III (H)      Hyperlipidemia LDL goal <100      Orthopnea        PAST SURGICAL HISTORY:  Past Surgical History:   Procedure Laterality Date     Amputation of foot Right 2023     APPENDECTOMY       BYPASS GRAFT ARTERY CORONARY N/A 9/10/2024    Procedure: median sternomy, coronary artery bypass graft X 4, Left internal mammary haverst, Left endoscopic saphenous emory havest, on cardiopulmonary bypass, Transesophageal Echocardiogram, Right femoral balloon pump and right fluroscopy.;  Surgeon: Evan Giles MD;  Location: [facility] OR     COLONOSCOPY       CV RIGHT HEART CATH MEASUREMENTS RECORDED N/A 6/10/2025    Procedure: Heart Cath Right Heart Cath;  Surgeon: Yajaira Gates MD;  Location:  HEART CARDIAC CATH LAB     INSERT INTRAAORTIC BALLOON PUMP N/A 9/10/2024    Procedure: Insert intraaortic balloon pump;   Surgeon: Evan Giles MD;  Location: [facility] OR     IRRIGATION AND DEBRIDEMENT Left     foot       FAMILY HISTORY:  Family History   Problem Relation Age of Onset     Anesthesia Reaction Mother         Slow to wake     Coronary Artery Disease Maternal Grandfather      Coronary Artery Disease Paternal Grandmother      Coronary Artery Disease Paternal Grandfather      Lung Cancer Paternal Grandfather      Substance Abuse Son      Alcoholism Maternal Aunt      Arthritis Maternal Aunt      Alcoholism Maternal Uncle      Clotting Disorder No family hx of      Bleeding Disorder No family hx of        SOCIAL HISTORY:  Social History     Socioeconomic History     Marital status: Single     Spouse name: None     Number of children: None     Years of education: None     Highest education level: None   Tobacco Use     Smoking status: Former     Current packs/day: 0.00     Types: Cigarettes     Quit date:      Years since quittin.8     Smokeless tobacco: Never   Substance and Sexual Activity     Alcohol use: Yes     Comment: 2x a year     Drug use: Not Currently     Social Drivers of Health     Financial Resource Strain: Low Risk  (9/15/2024)    Financial Resource Strain      Within the past 12 months, have you or your family members you live with been unable to get utilities (heat, electricity) when it was really needed?: No   Food Insecurity: Not on File (2024)    Received from [employer]    Food Insecurity      Food: 0   Transportation Needs: Low Risk  (9/15/2024)    Transportation Needs      Within the past 12 months, has lack of transportation kept you from medical appointments, getting your medicines, non-medical meetings or appointments, work, or from getting things that you need?: No   Physical Activity: Not on File (12/15/2023)    Received from [employer]    Physical Activity      Physical Activity: 0   Stress: Not on File (12/15/2023)    Received from [employer]    Stress      Stress: 0   Social Connections: Not  on File (9/13/2024)    Received from [employer]    Social [employer]      Connectedness: 0   Interpersonal Safety: Low Risk  (9/10/2024)    Interpersonal Safety      Do you feel physically and emotionally safe where you currently live?: Yes      Within the past 12 months, have you been hit, slapped, kicked or otherwise physically hurt by someone?: No      Within the past 12 months, have you been humiliated or emotionally abused in other ways by your partner or ex-partner?: No   Housing Stability: High Risk (9/15/2024)    Housing Stability      Do you have housing? : No      Are you worried about losing your housing?: No       MEDICATIONS:  Current Outpatient Medications   Medication Sig Dispense Refill     acetaminophen (TYLENOL) 500 MG tablet Take 1.5 tablets (750 mg) by mouth every 6 hours as needed for other (For optimal non-opioid multimodal pain management to improve pain control.).       albuterol (PROAIR HFA/PROVENTIL HFA/VENTOLIN HFA) 108 (90 Base) MCG/ACT inhaler Inhale 2 puffs into the lungs every 4 hours as needed       aspirin 81 MG EC tablet Take 1 tablet by mouth daily.       atorvastatin (LIPITOR) 40 MG tablet Take 40 mg by mouth at bedtime.       budesonide-formoterol (SYMBICORT) 80-4.5 MCG/ACT Inhaler Inhale 2 puffs into the lungs as needed       bumetanide (BUMEX) 1 MG tablet Take 1 mg by mouth as needed (swelling, SOB).       Calcium Carbonate-Vitamin D (OYSTER SHELL CALCIUM/D) 500-5 MG-MCG TABS Take 1 tablet by mouth daily       carvedilol (COREG) 3.125 MG tablet Take 2 tablets (6.25 mg) by mouth 2 times daily.       cetirizine (ZYRTEC) 10 MG tablet Take 10 mg by mouth daily       DOXYCYCLINE HYCLATE PO Take 100 mg by mouth every morning       empagliflozin (JARDIANCE) 10 MG TABS tablet Take 1 tablet (10 mg) by mouth daily. 30 tablet 1     EPINEPHrine (ANY BX GENERIC EQUIV) 0.3 MG/0.3ML injection 2-pack Inject 0.3 mg into the muscle as needed for anaphylaxis       eplerenone (INSPRA) 25 MG tablet  Take 25 mg by mouth every morning.       OZEMPIC, 2 MG/DOSE, 8 MG/3ML pen Inject 2 mg subcutaneously every 7 days. Fridays  Last dose: 8/31       sacubitril-valsartan (ENTRESTO) 49-51 MG per tablet Take 1 tablet by mouth 2 times daily. 60 tablet 3     Vitamin D3 50 mcg (2000 units) tablet Take 1 tablet by mouth daily.       methocarbamol (ROBAXIN) 750 MG tablet Take 1 tablet (750 mg) by mouth every 6 hours as needed for muscle spasms or other (pain). (Patient not taking: Reported on 6/23/2025) 90 tablet 0     oxyCODONE (ROXICODONE) 5 MG tablet Take 1 tablet (5 mg) by mouth every 6 hours as needed for severe pain. (Patient not taking: Reported on 6/23/2025) 15 tablet 0     polyethylene glycol (MIRALAX) 17 GM/Dose powder Take 17 g by mouth daily as needed for constipation. (Patient not taking: Reported on 6/23/2025) 510 g 0     senna-docusate (SENOKOT-S/PERICOLACE) 8.6-50 MG tablet Take 2 tablets by mouth or Feeding Tube 2 times daily as needed for constipation. (Patient not taking: Reported on 6/23/2025) 30 tablet 0     No current facility-administered medications for this visit.        ALLERGIES:     Allergies   Allergen Reactions     Bee Venom Anaphylaxis     Perflutren Lipid Microspheres Other (See Comments)     Severe lower back pain (Definity)    Other Reaction(s): Other (see comments)      Severe lower back pain (Definity)       PHYSICAL EXAM:  /71 (BP Location: Right arm, Patient Position: Chair, Cuff Size: Adult Regular)   Pulse 98   Wt 108.7 kg (239 lb 11.2 oz)   SpO2 98%   BMI 30.78 kg/m    Gen: alert, interactive, NAD  Neck: supple, no significant JVD  CV: RRR, no m/r/g  Chest: CTAB, no wheezes or crackles  Ext: Wearing a boot    LABS:    CMP  Last Comprehensive Metabolic Panel:  Sodium   Date Value Ref Range Status   06/23/2025 139 135 - 145 mmol/L Final     Potassium   Date Value Ref Range Status   06/23/2025 4.5 3.4 - 5.3 mmol/L Final     Potassium POCT   Date Value Ref Range Status  "  09/10/2024 4.9 3.4 - 5.3 mmol/L Final     Chloride   Date Value Ref Range Status   2025 103 98 - 107 mmol/L Final     Carbon Dioxide (CO2)   Date Value Ref Range Status   2025 26 22 - 29 mmol/L Final     Anion Gap   Date Value Ref Range Status   2025 10 7 - 15 mmol/L Final     Glucose   Date Value Ref Range Status   2025 173 (H) 70 - 99 mg/dL Final     GLUCOSE BY METER POCT   Date Value Ref Range Status   10/09/2024 116 (H) 70 - 99 mg/dL Final     Urea Nitrogen   Date Value Ref Range Status   2025 18.1 6.0 - 20.0 mg/dL Final     Creatinine   Date Value Ref Range Status   2025 1.31 (H) 0.67 - 1.17 mg/dL Final     GFR Estimate   Date Value Ref Range Status   2025 64 >60 mL/min/1.73m2 Final     Comment:     eGFR calculated using  CKD-EPI equation.     Calcium   Date Value Ref Range Status   2025 9.1 8.8 - 10.4 mg/dL Final     Bilirubin Total   Date Value Ref Range Status   2025 0.6 <=1.2 mg/dL Final     Alkaline Phosphatase   Date Value Ref Range Status   2025 135 40 - 150 U/L Final     ALT   Date Value Ref Range Status   2025 30 0 - 70 U/L Final     AST   Date Value Ref Range Status   2025 31 0 - 45 U/L Final       NT-proBNP       TROP  No results found for: \"TROPI\", \"TROPONIN\", \"TROPR\", \"TROPN\"    CBC  CBC RESULTS:   Recent Labs   Lab Test 24  0619   WBC 4.4   RBC 2.92*   HGB 9.2*   HCT 27.6*   MCV 95   MCH 31.5   MCHC 33.3   RDW 13.7          LIPIDS  No results for input(s): \"CHOL\", \"HDL\", \"LDL\", \"TRIG\", \"CHOLHDLRATIO\" in the last 42845 hours.    TSH  No results found for: \"TSH\"    HBA1C  No results found for: \"A1C\"      CARDIAC DATA:    EK/10/2024: Heart rate 103 bpm, sinus tachycardia, left axis deviation, inferior infarct    Echo:  6/3/2024, [facility] [place]   The estimated left ventricular ejection fraction is 21%.   Tricuspid regurgitation jet is not adequate for estimation of PA systolic   pressure.   Based on IVC geometry, the " right atrial pressure is probably upper limit   of normal/mildly increased   Left ventricular enlargement .   Decreased left ventricular systolic performance, severe   Est Stroke volume 48 cc   Dilated cardiomyopathy .   Regional wall motion abnormality-inferolateral .     9/10/2024, [facility]  Moderately reduced LV systolic function w/ LVEF 30%. Normal RV systolic   function. Grade I DD. No RWMAs visualized. No AS or AI. Mild MR. No   pericardial effusion. No aortic dissection.     9/14/2024, [facility]  Left ventricular function is decreased. The ejection fraction is 35-40%  (moderately reduced).  Mid to apical anteroseptum is akinetic.  The right ventricle is normal size.  Global right ventricular function is mildly reduced.  The inferior vena cava was normal in size with preserved respiratory  variability.  No significant valvular abnormalities present.  Previous study not available for comparison    Dec 2024  Mild left ventricular dilation is present.LVIDd 59mm.  Left ventricular function is decreased. The ejection fraction is 40-45%  (mildly reduced).  Right ventricular function, chamber size, wall motion, and thickness are  normal.  Sinuses of Valsalva 4.3 cm.  Ascending Aorta dilatation is present.  The inferior vena cava is normal.  No pericardial effusion is present.    Stress Test:  None available    Cardiac MRI:  3/15/2024, [facility] [place]    1) Severely reduced left ventricular ejection fraction, calculated EF 14%   2) Dilated LV cavity with asynchronous septal motion consistent with left bundle-branch block.   3) Transmural late gadolinium hyperenhancement involving the apical inferior wall, and subendocardial pattern of hyperenhancement involving about 50% of the mid inferolateral wall. Apart from the apical inferior wall, there is preserved myocardial viability in all other distributions.   4) Small pericardial effusion and bilateral pleural effusions.     Cardiac Catheterization:  3/14/2024, [facility] [place].    Right heart  catheterization -     Mean RA 7 mmHg   RV 39/10 mmHg   PA 43/17 mmHg; mean 31 mm Hg   PCWP 22-23 mmHg     Cardiac output 3.5 L/min by thermodilution method (cardiac index 1.5   L/min/m^2)   Cardiac output 4.4 L/min by estimated Yaritza method (PA saturation 72 %,   Index 1.9 L/min/m^2)     Please find complete angiographic detail below   LVEDP following angiography is 24 mmHg     Coronary angiogram-  Diffuse three-vessel coronary artery disease with severe stenoses noted in   all 3 major coronary territories     [facility] June 2025  RA:4 (Mean)  RV:18/4  PCWP:5(mean)  PA:18/12/14  Thermodilution; CO/CI:4.80/2.06  Yaritza; CO/CI:4.13/1.77  PVR:1.94       Right sided filling pressures are normal.     Left sided filling pressures are normal.     Normal PA pressures.     Reduced cardiac output level.     Normal right and left side filling pressures  Normal pulmonary pressures   Mildly reduced cardiac output    ASSESSMENT/PLAN:  In summary, Scott Donato is here today with the following -      1.  HFrEF, chronic combined systolic and diastolic heart failure, ACC/AHA stage C, NYHA class II, LVEF 35 to 40% --> 40-45%  2.  Ischemic cardiomyopathy  3.  Coronary artery disease, status post CABG September 2024  4.  Hypertension  5.  Mixed hyperlipidemia     Plans-  HFrEF, chronic combined systolic and diastolic heart failure:  Goal Directed Medical Therapies for Heart Failure:     Scott has had improvement in his LV ejection fraction with moderate doses of goal-directed medical therapy.  His recent right heart cath also shows low -normal filling pressures and borderline cardiac index in that setting.  These suggest that he is reasonably well compensated from a cardiac perspective.  With upcoming surgery on his left foot and possibly right foot following that, I have suggested we make no changes to his GDMT today.  He will follow-up with [facility] clinic in 3 months and further changes can be made and he can see me back in 6 months.    We  discussed the role of GDMT in heart failure, these are indicated irrespective of the etiology of heart failure.  We discussed the primary medications, their side effects, the care plan including frequent follow-ups  for optimization of therapies with monitoring of blood pressures, weights and lab results       Beta blocker: On carvedilol 6.25 mg twice a day  ACE/ARB/ARNI: Sacubitril/valsartan 49-51 mg BID   MRA: On eplerenone 25 mg daily  SGLT2 inhibitor: On empagliflozin 10 mg once a day    Diuretics: On Bumex 1 mg daily as needed -takes 1-2 doses in a day up to 3-4 times a week or less based on symptoms   Cardiac Rehab: was going to it earlier and stopped it with plans to go to the gym when his leg issues allow  ICD/ CRT-D: Would not be indicated based on EF greater than 35%  Labs: At follow up  Follow up: [facility] clinic in 3 months and see me back in 6 months  CV Genetic testing: will defer based on ICM    Advised on daily home BP and weight monitoring  Advised on observing caution with salt intake    Coronary artery disease: Status post CABG 9/10/24, on aspirin 81 mg daily and atorvastatin 40 mg daily.  No chest pain or shortness of breath or other concerning symptoms with reasonable functional capacity with his family limited by his orthopedic issues    Screened for sleep apnea: he denies any concerns    Upcoming surgery on his left foot/ possibly right foot: He is well compensated from a heart failure perspective and does not need any further cardiac testing prior to upcoming surgery.  He denies any cardiac symptoms with reasonable functional capacity which is primarily limited by his orthopedic issues.  I have recommended that he hold his empagliflozin for 72 hours before surgery and can resume it when he is back on a regular diet.  Ideally aspirin and other cardiac medications would be continued perioperatively    I spent 43 minutes in care of the patient today including obtaining recent medical history,  personally reviewing recent cardiac testing and/or lab results, today's examination, discussion of testing results and care recommendations with patient.     The longitudinal plan of care for the diagnosis(es)/condition(s) as documented were addressed during this visit. Due to the added complexity in care, I will continue to support Scott in the subsequent management and with ongoing continuity of care.     Thank you for the opportunity to participate in this patient's care. Please feel free to reach out with any questions or concerns.      Terrence Cazares MD, [facility]  Associate Professor of Medicine  Advanced Heart Failure, LVAD & Cardiac Transplant  Director, Cardio-Obstetrics  Cardio-Oncology  [facility]      Please do not hesitate to contact me if you have any questions/concerns.     Sincerely,     Terrence Cazares MD

## 2025-06-23 NOTE — NURSING NOTE
Chief Complaint   Patient presents with    Follow Up     RETURN HEART FAILURE         Vitals were taken, medications reconciled.    Chinyere Miles, Visit Facilitator

## 2025-06-23 NOTE — TELEPHONE ENCOUNTER
Patient Contacted for the patient to call back and schedule the following:    Appointment type: Return [facility]  Provider: Kendy  Return date: 09/23  Specialty phone number: [phone] opt [phone]  Additional appointment(s) needed: Labs  Additonal Notes: NA

## 2025-06-23 NOTE — PROGRESS NOTES
[facility]  Advanced Heart Failure Clinic    Patient Name: Scott Donato   : 1970   Date of Visit: 2025    Primary Care Physician: Fran Irwin MD     Referring Physician: TRACY Munoz  Reason for Visit: ischemic cardiomyopathy    Dear TRACY Munoz and Dr. Irwin,     I had the pleasure of seeing Scott Donato today in the [facility] ER Advanced Heart Failure Clinic. As you know Scott Donato is a pleasant 54 year old year old male, who presents today for further evaluation of ischemic cardiomyopathy.    Scott has a history of HFrEF 2/2 ICM (EF 35-40%, ernie 10%), CAD s/p CABG x4 (LIMA to LAD, SVG to D1, SVG to PDA, SVG to OM2, 9/10/24), DM2, and asthma     He established HF care with me in 2024.  Today, he presents for follow up.    Since last visit, he has been seen in CORE clinic with increase in GDMT doses. He had an ER visit in 2025 for n/v/d with elevated lipase, U/S showed cholelithiasis. He received IV fluids with improvement in symptoms. He is on augmentin for metatarsal osteomyelitis. His bumex dose has been adjusted for symptoms of volume retention. He was referred for a RHC which was completed recently.    Today he reports feeling well. He works full time at [employer], working 40 hours a week. He works 4 hours and has to rest for 4 hours due to osteomyelitis related pain in his legs. He is scheduled for surgery next month on left leg - 5th metatarsal ([facility]'s). He is hoping he may avoid right leg surgery but is being monitored for it.     He has not had any recent shortness of breath. He recently moved and and that day was exhausting. He is now slowly unpacking. His symptoms of bloated-ness is primarily abdominal distension.  For this he takes Bumex as needed 1 mg sometimes up to 2 doses in a day and up to 3-4 times per week.  He denies chest pain, PND/orthopnea, palpitations, lightheadedness, syncope,  leg swelling.  Compliant with medications and notes no problems taking them but sometimes feels he experiences for an interaction with augmentin and bumex (nausea), although this is improved.      Home BPs - 100s-120s/ 70s-80s  Home HRs - not checking  Home weights - he believes his goal is ~231-234 lbs, and takes bumex for weight gain or swelling or abdominal bloating    ROS:  A complete 10-point ROS was negative except as above.    PAST MEDICAL HISTORY:  Past Medical History:   Diagnosis Date    Asthma     CAD (coronary artery disease)     Charcot foot due to diabetes mellitus (H)     Chronic kidney disease     DM2 (diabetes mellitus, type 2) (H)     Dyspnea on exertion     Former smoker     Heart failure with reduced ejection fraction, NYHA class III (H)     Hyperlipidemia LDL goal <100     Orthopnea        PAST SURGICAL HISTORY:  Past Surgical History:   Procedure Laterality Date    Amputation of foot Right 2023    APPENDECTOMY      BYPASS GRAFT ARTERY CORONARY N/A 9/10/2024    Procedure: median sternomy, coronary artery bypass graft X 4, Left internal mammary haverst, Left endoscopic saphenous emory havest, on cardiopulmonary bypass, Transesophageal Echocardiogram, Right femoral balloon pump and right fluroscopy.;  Surgeon: Evan Giles MD;  Location: [facility] OR    COLONOSCOPY      CV RIGHT HEART CATH MEASUREMENTS RECORDED N/A 6/10/2025    Procedure: Heart Cath Right Heart Cath;  Surgeon: Yajaira Gates MD;  [facility]    INSERT INTRAAORTIC BALLOON PUMP N/A 9/10/2024    Procedure: Insert intraaortic balloon pump;  Surgeon: Evan Giles MD;  Location: [facility] OR    IRRIGATION AND DEBRIDEMENT Left     foot       FAMILY HISTORY:  Family History   Problem Relation Age of Onset    Anesthesia Reaction Mother         Slow to wake    Coronary Artery Disease Maternal Grandfather     Coronary Artery Disease Paternal Grandmother     Coronary Artery Disease Paternal Grandfather      Lung Cancer Paternal Grandfather     Substance Abuse Son     Alcoholism Maternal Aunt     Arthritis Maternal Aunt     Alcoholism Maternal Uncle     Clotting Disorder No family hx of     Bleeding Disorder No family hx of        SOCIAL HISTORY:  Social History     Socioeconomic History    Marital status: Single     Spouse name: None    Number of children: None    Years of education: None    Highest education level: None   Tobacco Use    Smoking status: Former     Current packs/day: 0.00     Types: Cigarettes     Quit date: 2018     Years since quittin.8    Smokeless tobacco: Never   Substance and Sexual Activity    Alcohol use: Yes     Comment: 2x a year    Drug use: Not Currently     Social Drivers of Health     Financial Resource Strain: Low Risk  (9/15/2024)    Financial Resource Strain     Within the past 12 months, have you or your family members you live with been unable to get utilities (heat, electricity) when it was really needed?: No   Food Insecurity: Not on File (2024)    Received from [employer]    Food Insecurity     Food: 0   Transportation Needs: Low Risk  (9/15/2024)    Transportation Needs     Within the past 12 months, has lack of transportation kept you from medical appointments, getting your medicines, non-medical meetings or appointments, work, or from getting things that you need?: No   Physical Activity: Not on File (12/15/2023)    Received from [employer]    Physical Activity     Physical Activity: 0   Stress: Not on File (12/15/2023)    Received from [employer]    Stress     Stress: 0   Social Connections: Not on File (2024)    Received from [employer]    Social Connections     Connectedness: 0   Interpersonal Safety: Low Risk  (9/10/2024)    Interpersonal Safety     Do you feel physically and emotionally safe where you currently live?: Yes     Within the past 12 months, have you been hit, slapped, kicked or otherwise physically hurt by someone?: No     Within the past 12 months, have you been  humiliated or emotionally abused in other ways by your partner or ex-partner?: No   Housing Stability: High Risk (9/15/2024)    Housing Stability     Do you have housing? : No     Are you worried about losing your housing?: No       MEDICATIONS:  Current Outpatient Medications   Medication Sig Dispense Refill    acetaminophen (TYLENOL) 500 MG tablet Take 1.5 tablets (750 mg) by mouth every 6 hours as needed for other (For optimal non-opioid multimodal pain management to improve pain control.).      albuterol (PROAIR HFA/PROVENTIL HFA/VENTOLIN HFA) 108 (90 Base) MCG/ACT inhaler Inhale 2 puffs into the lungs every 4 hours as needed      aspirin 81 MG EC tablet Take 1 tablet by mouth daily.      atorvastatin (LIPITOR) 40 MG tablet Take 40 mg by mouth at bedtime.      budesonide-formoterol (SYMBICORT) 80-4.5 MCG/ACT Inhaler Inhale 2 puffs into the lungs as needed      bumetanide (BUMEX) 1 MG tablet Take 1 mg by mouth as needed (swelling, SOB).      Calcium Carbonate-Vitamin D (OYSTER SHELL CALCIUM/D) 500-5 MG-MCG TABS Take 1 tablet by mouth daily      carvedilol (COREG) 3.125 MG tablet Take 2 tablets (6.25 mg) by mouth 2 times daily.      cetirizine (ZYRTEC) 10 MG tablet Take 10 mg by mouth daily      DOXYCYCLINE HYCLATE PO Take 100 mg by mouth every morning      empagliflozin (JARDIANCE) 10 MG TABS tablet Take 1 tablet (10 mg) by mouth daily. 30 tablet 1    EPINEPHrine (ANY BX GENERIC EQUIV) 0.3 MG/0.3ML injection 2-pack Inject 0.3 mg into the muscle as needed for anaphylaxis      eplerenone (INSPRA) 25 MG tablet Take 25 mg by mouth every morning.      OZEMPIC, 2 MG/DOSE, 8 MG/3ML pen Inject 2 mg subcutaneously every 7 days. Fridays  Last dose: 8/31      sacubitril-valsartan (ENTRESTO) 49-51 MG per tablet Take 1 tablet by mouth 2 times daily. 60 tablet 3    Vitamin D3 50 mcg (2000 units) tablet Take 1 tablet by mouth daily.      methocarbamol (ROBAXIN) 750 MG tablet Take 1 tablet (750 mg) by mouth every 6 hours as  needed for muscle spasms or other (pain). (Patient not taking: Reported on 6/23/2025) 90 tablet 0    oxyCODONE (ROXICODONE) 5 MG tablet Take 1 tablet (5 mg) by mouth every 6 hours as needed for severe pain. (Patient not taking: Reported on 6/23/2025) 15 tablet 0    polyethylene glycol (MIRALAX) 17 GM/Dose powder Take 17 g by mouth daily as needed for constipation. (Patient not taking: Reported on 6/23/2025) 510 g 0    senna-docusate (SENOKOT-S/PERICOLACE) 8.6-50 MG tablet Take 2 tablets by mouth or Feeding Tube 2 times daily as needed for constipation. (Patient not taking: Reported on 6/23/2025) 30 tablet 0     No current facility-administered medications for this visit.        ALLERGIES:     Allergies   Allergen Reactions    Bee Venom Anaphylaxis    Perflutren Lipid Microspheres Other (See Comments)     Severe lower back pain (Definity)    Other Reaction(s): Other (see comments)      Severe lower back pain (Definity)       PHYSICAL EXAM:  /71 (BP Location: Right arm, Patient Position: Chair, Cuff Size: Adult Regular)   Pulse 98   Wt 108.7 kg (239 lb 11.2 oz)   SpO2 98%   BMI 30.78 kg/m    Gen: alert, interactive, NAD  Neck: supple, no significant JVD  CV: RRR, no m/r/g  Chest: CTAB, no wheezes or crackles  Ext: Wearing a boot    LABS:    CMP  Last Comprehensive Metabolic Panel:  Sodium   Date Value Ref Range Status   06/23/2025 139 135 - 145 mmol/L Final     Potassium   Date Value Ref Range Status   06/23/2025 4.5 3.4 - 5.3 mmol/L Final     Potassium POCT   Date Value Ref Range Status   09/10/2024 4.9 3.4 - 5.3 mmol/L Final     Chloride   Date Value Ref Range Status   06/23/2025 103 98 - 107 mmol/L Final     Carbon Dioxide (CO2)   Date Value Ref Range Status   06/23/2025 26 22 - 29 mmol/L Final     Anion Gap   Date Value Ref Range Status   06/23/2025 10 7 - 15 mmol/L Final     Glucose   Date Value Ref Range Status   06/23/2025 173 (H) 70 - 99 mg/dL Final     GLUCOSE BY METER POCT   Date Value Ref Range  "Status   10/09/2024 116 (H) 70 - 99 mg/dL Final     Urea Nitrogen   Date Value Ref Range Status   2025 18.1 6.0 - 20.0 mg/dL Final     Creatinine   Date Value Ref Range Status   2025 1.31 (H) 0.67 - 1.17 mg/dL Final     GFR Estimate   Date Value Ref Range Status   2025 64 >60 mL/min/1.73m2 Final     Comment:     eGFR calculated using  CKD-EPI equation.     Calcium   Date Value Ref Range Status   2025 9.1 8.8 - 10.4 mg/dL Final     Bilirubin Total   Date Value Ref Range Status   2025 0.6 <=1.2 mg/dL Final     Alkaline Phosphatase   Date Value Ref Range Status   2025 135 40 - 150 U/L Final     ALT   Date Value Ref Range Status   2025 30 0 - 70 U/L Final     AST   Date Value Ref Range Status   2025 31 0 - 45 U/L Final       NT-proBNP       TROP  No results found for: \"TROPI\", \"TROPONIN\", \"TROPR\", \"TROPN\"    CBC  CBC RESULTS:   Recent Labs   Lab Test 24  0619   WBC 4.4   RBC 2.92*   HGB 9.2*   HCT 27.6*   MCV 95   MCH 31.5   MCHC 33.3   RDW 13.7          LIPIDS  No results for input(s): \"CHOL\", \"HDL\", \"LDL\", \"TRIG\", \"CHOLHDLRATIO\" in the last 24585 hours.    TSH  No results found for: \"TSH\"    HBA1C  No results found for: \"A1C\"      CARDIAC DATA:    EK/10/2024: Heart rate 103 bpm, sinus tachycardia, left axis deviation, inferior infarct    [place] [facility] [place]   The estimated left ventricular ejection fraction is 21%.   Tricuspid regurgitation jet is not adequate for estimation of PA systolic   pressure.   Based on IVC geometry, the right atrial pressure is probably upper limit   of normal/mildly increased   Left ventricular enlargement .   Decreased left ventricular systolic performance, severe   Est Stroke volume 48 cc   Dilated cardiomyopathy .   Regional wall motion abnormality-inferolateral .     9/10/2024, [facility]  Moderately reduced LV systolic function w/ LVEF 30%. Normal RV systolic   function. Grade I DD. No RWMAs visualized. No AS or AI. Mild " MR. No   pericardial effusion. No aortic dissection.     9/14/2024, [facility]  Left ventricular function is decreased. The ejection fraction is 35-40%  (moderately reduced).  Mid to apical anteroseptum is akinetic.  The right ventricle is normal size.  Global right ventricular function is mildly reduced.  The inferior vena cava was normal in size with preserved respiratory  variability.  No significant valvular abnormalities present.  Previous study not available for comparison    Dec 2024  Mild left ventricular dilation is present.LVIDd 59mm.  Left ventricular function is decreased. The ejection fraction is 40-45%  (mildly reduced).  Right ventricular function, chamber size, wall motion, and thickness are  normal.  Sinuses of Valsalva 4.3 cm.  Ascending Aorta dilatation is present.  The inferior vena cava is normal.  No pericardial effusion is present.    Stress Test:  None available    Cardiac MRI:  3/15/2024, [facility] [place]    1) Severely reduced left ventricular ejection fraction, calculated EF 14%   2) Dilated LV cavity with asynchronous septal motion consistent with left bundle-branch block.   3) Transmural late gadolinium hyperenhancement involving the apical inferior wall, and subendocardial pattern of hyperenhancement involving about 50% of the mid inferolateral wall. Apart from the apical inferior wall, there is preserved myocardial viability in all other distributions.   4) Small pericardial effusion and bilateral pleural effusions.     Cardiac Catheterization:  3/14/2024, [facility] [place].    Right heart catheterization -     Mean RA 7 mmHg   RV 39/10 mmHg   PA 43/17 mmHg; mean 31 mm Hg   PCWP 22-23 mmHg     Cardiac output 3.5 L/min by thermodilution method (cardiac index 1.5   L/min/m^2)   Cardiac output 4.4 L/min by estimated Yaritza method (PA saturation 72 %,   Index 1.9 L/min/m^2)     Please find complete angiographic detail below   LVEDP following angiography is 24 mmHg     Coronary angiogram-  Diffuse three-vessel coronary  artery disease with severe stenoses noted in   all 3 major coronary territories     [facility] June 2025  RA:4 (Mean)  RV:18/4  PCWP:5(mean)  PA:18/12/14  Thermodilution; CO/CI:4.80/2.06  Yaritza; CO/CI:4.13/1.77  PVR:1.94      Right sided filling pressures are normal.    Left sided filling pressures are normal.    Normal PA pressures.    Reduced cardiac output level.     Normal right and left side filling pressures  Normal pulmonary pressures   Mildly reduced cardiac output    ASSESSMENT/PLAN:  In summary, Scott Donato is here today with the following -      1.  HFrEF, chronic combined systolic and diastolic heart failure, ACC/AHA stage C, NYHA class II, LVEF 35 to 40% --> 40-45%  2.  Ischemic cardiomyopathy  3.  Coronary artery disease, status post CABG September 2024  4.  Hypertension  5.  Mixed hyperlipidemia     Plans-  HFrEF, chronic combined systolic and diastolic heart failure:  Goal Directed Medical Therapies for Heart Failure:     Scott has had improvement in his LV ejection fraction with moderate doses of goal-directed medical therapy.  His recent right heart cath also shows low -normal filling pressures and borderline cardiac index in that setting.  These suggest that he is reasonably well compensated from a cardiac perspective.  With upcoming surgery on his left foot and possibly right foot following that, I have suggested we make no changes to his GDMT today.  He will follow-up with CORE clinic in 3 months and further changes can be made and he can see me back in 6 months.    We discussed the role of GDMT in heart failure, these are indicated irrespective of the etiology of heart failure.  We discussed the primary medications, their side effects, the care plan including frequent follow-ups  for optimization of therapies with monitoring of blood pressures, weights and lab results       Beta blocker: On carvedilol 6.25 mg twice a day  ACE/ARB/ARNI: Sacubitril/valsartan 49-51 mg BID   MRA: On eplerenone 25 mg  daily  SGLT2 inhibitor: On empagliflozin 10 mg once a day    Diuretics: On Bumex 1 mg daily as needed -takes 1-2 doses in a day up to 3-4 times a week or less based on symptoms   Cardiac Rehab: was going to it earlier and stopped it with plans to go to the gym when his leg issues allow  ICD/ CRT-D: Would not be indicated based on EF greater than 35%  Labs: At follow up  Follow up: CORE clinic in 3 months and see me back in 6 months  CV Genetic testing: will defer based on ICM    Advised on daily home BP and weight monitoring  Advised on observing caution with salt intake    Coronary artery disease: Status post CABG 9/10/24, on aspirin 81 mg daily and atorvastatin 40 mg daily.  No chest pain or shortness of breath or other concerning symptoms with reasonable functional capacity with his family limited by his orthopedic issues    Screened for sleep apnea: he denies any concerns    Upcoming surgery on his left foot/ possibly right foot: He is well compensated from a heart failure perspective and does not need any further cardiac testing prior to upcoming surgery.  He denies any cardiac symptoms with reasonable functional capacity which is primarily limited by his orthopedic issues.  I have recommended that he hold his empagliflozin for 72 hours before surgery and can resume it when he is back on a regular diet.  Ideally aspirin and other cardiac medications would be continued perioperatively    I spent 43 minutes in care of the patient today including obtaining recent medical history, personally reviewing recent cardiac testing and/or lab results, today's examination, discussion of testing results and care recommendations with patient.     The longitudinal plan of care for the diagnosis(es)/condition(s) as documented were addressed during this visit. Due to the added complexity in care, I will continue to support Scott in the subsequent management and with ongoing continuity of care.     Thank you for the opportunity  to participate in this patient's care. Please feel free to reach out with any questions or concerns.      Terrence Cazares MD, FACC  Associate Professor of Medicine  Advanced Heart Failure, LVAD & Cardiac Transplant  Director, Cardio-Obstetrics  Cardio-Oncology  [facility]

## 2025-06-23 NOTE — PATIENT INSTRUCTIONS
Cardiology Providers you saw during your visit:  Dr. Cazares     Medication changes:  1- No medication changes   2- HOLD your Jardiance 72 hours prior to being NPO (morning of surgery)         Follow up:  1- [facility] follow up 3 months  2- Dr. Cazares in 6 months          Please call if you have:  1. Weight gain of more than 2 pounds in a day or 5 pounds in a week  2. Increased shortness of breath, swelling or bloating  3. Dizziness, lightheadedness   4. Any questions or concerns.        Follow the American Heart Association Diet and Lifestyle recommendations:  Limit saturated fat, trans fat, sodium, red meat, sweets and sugar-sweetened beverages. If you choose to eat red meat, compare labels and select the leanest cuts available.  Aim for at least 150 minutes of moderate physical activity or 75 minutes of vigorous physical activity - or an equal combination of both - each week.     During business hours: [phone], press option # 1 to schedule an appointment or send a message to your care team     After hours, weekends or holidays: On Call Cardiologist- [phone]   option #4 and ask to speak to the on-call Cardiologist. Inform them you are a CORE/heart failure patient at the [place].     Jacinta Kahn RN BSN CHFN  Cardiology Nurse Care Coordinator (Heart Failure / C.O.R.E.)

## 2025-07-08 ENCOUNTER — PATIENT OUTREACH (OUTPATIENT)
Dept: CARDIOLOGY | Facility: CLINIC | Age: 55
End: 2025-07-08
Payer: COMMERCIAL

## 2025-07-08 DIAGNOSIS — I50.42 CHRONIC COMBINED SYSTOLIC (CONGESTIVE) AND DIASTOLIC (CONGESTIVE) HEART FAILURE (H): Primary | ICD-10-CM

## 2025-07-10 NOTE — PROGRESS NOTES
Called Scott with recommendations:      If BP remains improved and no further dizziness - would prefer not to change. If persistent dizziness with low BP, can reduce ARNI from 49/51 to 24/26 mg BID     We walked through this plan. He will start checking blood pressures, and if persistently low (told him we are ok with 90s and 100s, but if lower than that and is dizzy, can decrease). Also reiterated to reach out to his foot doctor about the side effects from the Augmentin.   He verbalized understanding. Told him I would check in with him next week.

## 2025-07-10 NOTE — PROGRESS NOTES
Scott called me back. He is feeling slightly better. Still having some of the same GI symptoms but not as bad, still with lightheadedness and having dizzy spells as well.     What he has discovered since he talked to us on Monday is that everytime he takes Augmentin it affects him with these GI symptoms. Is on this longer term for his foot ulcer.   Last week he ran out of it and went 2-3 days without it and once he started it back up these issues started happening. He hasn't been checking his BP regularly, but when he was in clinic on Monday with his foot doctor it was 83/63. Now its 101/63.   Monday when he was in clinic BP was 83/63. He just checked it now and it 101/63.     We discussed that he needs to reach out to his foot doctor team to see if they have an alternative to the Augmentin if he suspects that is causing the GI issues.   For the lightheadedness, we discussed repeating some labs. He has not taken any bumex for a couple days now. I will review with provider for any other recommendations.

## 2025-07-10 NOTE — PROGRESS NOTES
Called Scott back.     Spoke to him briefly and then got disconnected. Tried back and left voicemail.     Per Dr. Cazares:     Hard to know without seeing him in clinic/ getting labs.  Sounds like vomiting etc. could have made him dehydrated.  It is reasonable to hold Bumex with that but perhaps at least repeat labs first     Is he checking blood pressures    Left voicemail for Scott and will send [employer] message.

## 2025-08-09 ENCOUNTER — HEALTH MAINTENANCE LETTER (OUTPATIENT)
Age: 55
End: 2025-08-09

## 2025-08-16 ENCOUNTER — APPOINTMENT (OUTPATIENT)
Dept: GENERAL RADIOLOGY | Facility: CLINIC | Age: 55
End: 2025-08-16
Attending: EMERGENCY MEDICINE
Payer: COMMERCIAL

## 2025-08-16 ENCOUNTER — HOSPITAL ENCOUNTER (EMERGENCY)
Facility: CLINIC | Age: 55
Discharge: HOME | End: 2025-08-16
Attending: EMERGENCY MEDICINE | Admitting: EMERGENCY MEDICINE
Payer: COMMERCIAL

## 2025-08-16 ENCOUNTER — NURSE TRIAGE (OUTPATIENT)
Dept: NURSING | Facility: CLINIC | Age: 55
End: 2025-08-16
Payer: COMMERCIAL

## 2025-08-16 VITALS
OXYGEN SATURATION: 99 % | SYSTOLIC BLOOD PRESSURE: 105 MMHG | HEIGHT: 74 IN | RESPIRATION RATE: 18 BRPM | WEIGHT: 242 LBS | BODY MASS INDEX: 31.06 KG/M2 | HEART RATE: 85 BPM | TEMPERATURE: 98.1 F | DIASTOLIC BLOOD PRESSURE: 75 MMHG

## 2025-08-16 DIAGNOSIS — R06.00 DYSPNEA, UNSPECIFIED TYPE: Primary | ICD-10-CM

## 2025-08-16 LAB
ANION GAP SERPL CALCULATED.3IONS-SCNC: 10 MMOL/L (ref 7–15)
ATRIAL RATE - MUSE: 82 BPM
BASOPHILS # BLD AUTO: 0.04 10E3/UL (ref 0–0.2)
BASOPHILS NFR BLD AUTO: 0.7 %
BUN SERPL-MCNC: 21 MG/DL (ref 6–20)
CALCIUM SERPL-MCNC: 9.6 MG/DL (ref 8.8–10.4)
CHLORIDE SERPL-SCNC: 104 MMOL/L (ref 98–107)
CREAT SERPL-MCNC: 1.17 MG/DL (ref 0.67–1.17)
DIASTOLIC BLOOD PRESSURE - MUSE: NORMAL MMHG
EGFRCR SERPLBLD CKD-EPI 2021: 73 ML/MIN/1.73M2
EOSINOPHIL # BLD AUTO: 0.13 10E3/UL (ref 0–0.7)
EOSINOPHIL NFR BLD AUTO: 2.1 %
ERYTHROCYTE [DISTWIDTH] IN BLOOD BY AUTOMATED COUNT: 14.4 % (ref 10–15)
GLUCOSE SERPL-MCNC: 122 MG/DL (ref 70–99)
HCO3 SERPL-SCNC: 25 MMOL/L (ref 22–29)
HCT VFR BLD AUTO: 48.9 % (ref 40–53)
HGB BLD-MCNC: 15.7 G/DL (ref 13.3–17.7)
HOLD SPECIMEN: NORMAL
IMM GRANULOCYTES # BLD: <0.03 10E3/UL
IMM GRANULOCYTES NFR BLD: 0.3 %
INTERPRETATION ECG - MUSE: NORMAL
LYMPHOCYTES # BLD AUTO: 2.04 10E3/UL (ref 0.8–5.3)
LYMPHOCYTES NFR BLD AUTO: 33.7 %
MCH RBC QN AUTO: 27.4 PG (ref 26.5–33)
MCHC RBC AUTO-ENTMCNC: 32.1 G/DL (ref 31.5–36.5)
MCV RBC AUTO: 85.2 FL (ref 78–100)
MONOCYTES # BLD AUTO: 0.45 10E3/UL (ref 0–1.3)
MONOCYTES NFR BLD AUTO: 7.4 %
NEUTROPHILS # BLD AUTO: 3.37 10E3/UL (ref 1.6–8.3)
NEUTROPHILS NFR BLD AUTO: 55.8 %
NRBC # BLD AUTO: <0.03 10E3/UL
NRBC BLD AUTO-RTO: 0 /100
NT-PROBNP SERPL-MCNC: 212 PG/ML (ref 0–177)
P AXIS - MUSE: 57 DEGREES
PLATELET # BLD AUTO: 197 10E3/UL (ref 150–450)
POTASSIUM SERPL-SCNC: 4.3 MMOL/L (ref 3.4–5.3)
PR INTERVAL - MUSE: 162 MS
QRS DURATION - MUSE: 86 MS
QT - MUSE: 372 MS
QTC - MUSE: 434 MS
R AXIS - MUSE: -19 DEGREES
RBC # BLD AUTO: 5.74 10E6/UL (ref 4.4–5.9)
SODIUM SERPL-SCNC: 139 MMOL/L (ref 135–145)
SYSTOLIC BLOOD PRESSURE - MUSE: NORMAL MMHG
T AXIS - MUSE: 106 DEGREES
TROPONIN T SERPL HS-MCNC: 14 NG/L
VENTRICULAR RATE- MUSE: 82 BPM
WBC # BLD AUTO: 6.05 10E3/UL (ref 4–11)

## 2025-08-16 PROCEDURE — 99285 EMERGENCY DEPT VISIT HI MDM: CPT | Mod: 25 | Performed by: EMERGENCY MEDICINE

## 2025-08-16 PROCEDURE — 84484 ASSAY OF TROPONIN QUANT: CPT | Performed by: EMERGENCY MEDICINE

## 2025-08-16 PROCEDURE — 71046 X-RAY EXAM CHEST 2 VIEWS: CPT | Mod: 26 | Performed by: RADIOLOGY

## 2025-08-16 PROCEDURE — 85025 COMPLETE CBC W/AUTO DIFF WBC: CPT | Performed by: EMERGENCY MEDICINE

## 2025-08-16 PROCEDURE — 80048 BASIC METABOLIC PNL TOTAL CA: CPT | Performed by: EMERGENCY MEDICINE

## 2025-08-16 PROCEDURE — 36415 COLL VENOUS BLD VENIPUNCTURE: CPT | Performed by: EMERGENCY MEDICINE

## 2025-08-16 PROCEDURE — 83880 ASSAY OF NATRIURETIC PEPTIDE: CPT | Performed by: EMERGENCY MEDICINE

## 2025-08-16 PROCEDURE — 71046 X-RAY EXAM CHEST 2 VIEWS: CPT

## 2025-08-16 PROCEDURE — 99284 EMERGENCY DEPT VISIT MOD MDM: CPT | Performed by: EMERGENCY MEDICINE

## 2025-08-16 PROCEDURE — 93010 ELECTROCARDIOGRAM REPORT: CPT | Performed by: EMERGENCY MEDICINE

## 2025-08-16 PROCEDURE — 93005 ELECTROCARDIOGRAM TRACING: CPT | Performed by: EMERGENCY MEDICINE

## 2025-08-16 ASSESSMENT — ACTIVITIES OF DAILY LIVING (ADL)
ADLS_ACUITY_SCORE: 56

## 2025-08-20 DIAGNOSIS — I50.42 CHRONIC COMBINED SYSTOLIC (CONGESTIVE) AND DIASTOLIC (CONGESTIVE) HEART FAILURE (H): ICD-10-CM

## 2025-08-20 RX ORDER — SACUBITRIL AND VALSARTAN 49; 51 MG/1; MG/1
1 TABLET ORAL 2 TIMES DAILY
Qty: 180 TABLET | Refills: 2 | Status: SHIPPED | OUTPATIENT
Start: 2025-08-20

## 2025-08-21 ENCOUNTER — PRE VISIT (OUTPATIENT)
Dept: CARDIOLOGY | Facility: CLINIC | Age: 55
End: 2025-08-21
Payer: COMMERCIAL

## 2025-08-21 DIAGNOSIS — I50.42 CHRONIC COMBINED SYSTOLIC (CONGESTIVE) AND DIASTOLIC (CONGESTIVE) HEART FAILURE (H): Primary | ICD-10-CM

## 2025-08-26 ENCOUNTER — OFFICE VISIT (OUTPATIENT)
Dept: CARDIOLOGY | Facility: CLINIC | Age: 55
End: 2025-08-26
Attending: INTERNAL MEDICINE
Payer: COMMERCIAL

## 2025-08-26 ENCOUNTER — LAB (OUTPATIENT)
Dept: LAB | Facility: CLINIC | Age: 55
End: 2025-08-26
Attending: INTERNAL MEDICINE
Payer: COMMERCIAL

## 2025-08-26 VITALS
OXYGEN SATURATION: 99 % | WEIGHT: 245.3 LBS | DIASTOLIC BLOOD PRESSURE: 68 MMHG | HEART RATE: 78 BPM | SYSTOLIC BLOOD PRESSURE: 100 MMHG | BODY MASS INDEX: 31.49 KG/M2

## 2025-08-26 DIAGNOSIS — I50.42 CHRONIC COMBINED SYSTOLIC (CONGESTIVE) AND DIASTOLIC (CONGESTIVE) HEART FAILURE (H): Primary | ICD-10-CM

## 2025-08-26 DIAGNOSIS — I50.42 CHRONIC COMBINED SYSTOLIC (CONGESTIVE) AND DIASTOLIC (CONGESTIVE) HEART FAILURE (H): ICD-10-CM

## 2025-08-26 LAB
ANION GAP SERPL CALCULATED.3IONS-SCNC: 14 MMOL/L (ref 7–15)
BUN SERPL-MCNC: 24.9 MG/DL (ref 6–20)
CALCIUM SERPL-MCNC: 9.4 MG/DL (ref 8.8–10.4)
CHLORIDE SERPL-SCNC: 101 MMOL/L (ref 98–107)
CREAT SERPL-MCNC: 1.33 MG/DL (ref 0.67–1.17)
EGFRCR SERPLBLD CKD-EPI 2021: 63 ML/MIN/1.73M2
GLUCOSE SERPL-MCNC: 195 MG/DL (ref 70–99)
HCO3 SERPL-SCNC: 24 MMOL/L (ref 22–29)
POTASSIUM SERPL-SCNC: 4.3 MMOL/L (ref 3.4–5.3)
SODIUM SERPL-SCNC: 139 MMOL/L (ref 135–145)

## 2025-08-26 PROCEDURE — 36415 COLL VENOUS BLD VENIPUNCTURE: CPT | Performed by: PATHOLOGY

## 2025-08-26 PROCEDURE — 3074F SYST BP LT 130 MM HG: CPT | Performed by: CASE MANAGER/CARE COORDINATOR

## 2025-08-26 PROCEDURE — 80048 BASIC METABOLIC PNL TOTAL CA: CPT | Performed by: PATHOLOGY

## 2025-08-26 PROCEDURE — 99214 OFFICE O/P EST MOD 30 MIN: CPT | Performed by: CASE MANAGER/CARE COORDINATOR

## 2025-08-26 PROCEDURE — 3078F DIAST BP <80 MM HG: CPT | Performed by: CASE MANAGER/CARE COORDINATOR

## 2025-08-26 PROCEDURE — 1126F AMNT PAIN NOTED NONE PRSNT: CPT | Performed by: CASE MANAGER/CARE COORDINATOR

## 2025-08-26 PROCEDURE — 99213 OFFICE O/P EST LOW 20 MIN: CPT | Performed by: CASE MANAGER/CARE COORDINATOR

## 2025-08-26 ASSESSMENT — PAIN SCALES - GENERAL: PAINLEVEL_OUTOF10: NO PAIN (0)

## 2025-08-27 ENCOUNTER — PATIENT OUTREACH (OUTPATIENT)
Dept: CARE COORDINATION | Facility: CLINIC | Age: 55
End: 2025-08-27
Payer: COMMERCIAL

## 2025-09-02 ENCOUNTER — CARE COORDINATION (OUTPATIENT)
Dept: CARDIOLOGY | Facility: CLINIC | Age: 55
End: 2025-09-02
Payer: COMMERCIAL

## 2025-09-02 DIAGNOSIS — I50.42 CHRONIC COMBINED SYSTOLIC (CONGESTIVE) AND DIASTOLIC (CONGESTIVE) HEART FAILURE (H): Primary | ICD-10-CM

## (undated) DEVICE — TUBING SUCTION DRAINAGE PLEURAL DUAL 8884714200

## (undated) DEVICE — DRSG DRAIN 4X4" 7086

## (undated) DEVICE — SU VICRYL 2-0 CT-1 27" UND J259H

## (undated) DEVICE — PUNCH AORTIC 4.0MMX8" RCB40

## (undated) DEVICE — BLADE SAW STRK STERNAL 6207-97-101

## (undated) DEVICE — SUCTION CATH AIRLIFE TRI-FLO W/CONTROL PORT 14FR  T60C

## (undated) DEVICE — SU ETHIBOND 0 CT-1 CR 8X18" CX21D

## (undated) DEVICE — DEVICE TISSUE STABILIZATION OCTOBASE 28707

## (undated) DEVICE — SOL NACL 0.9% IRRIG 3000ML BAG 2B7477

## (undated) DEVICE — SPONGE LAP 18X18" X8435

## (undated) DEVICE — GLOVE BIOGEL PI MICRO SZ 7.5 48575

## (undated) DEVICE — SUCTION DRY CHEST DRAIN OASIS 3600-100

## (undated) DEVICE — CANNULA VESSEL 3ML BEVELED DPL 30000

## (undated) DEVICE — BNDG ELASTIC 4"X5YDS STERILE 6611-4S

## (undated) DEVICE — SOL NACL 0.9% IRRIG 1000ML BOTTLE 2F7124

## (undated) DEVICE — SYR 01ML 27GA 0.5" NDL TBC 309623

## (undated) DEVICE — TUBING INSUFFLATION PNEUMOCLEAR 0620050100

## (undated) DEVICE — DRAPE SHEET REV FOLD 3/4 9349

## (undated) DEVICE — SU STRATAFIX MONOCRYL 3-0 SPIRAL PS-2 45CM SXMP1B107

## (undated) DEVICE — SU PROLENE 6-0 C-1DA 30" 8706H

## (undated) DEVICE — SU PROLENE 6-0 C-1DA 4X24" M8726

## (undated) DEVICE — SU PROLENE 7-0 BV-1DA 24" 8702H

## (undated) DEVICE — DRAIN CHEST TUBE 32FR STR 8032

## (undated) DEVICE — SPONGE RAY-TEC 4X8" 7318

## (undated) DEVICE — INTRO SHEATH 7FRX10CM PINNACLE RSS702

## (undated) DEVICE — SU STEEL 6 CCS 4X18" M654G

## (undated) DEVICE — DRAPE IOBAN INCISE 23X17" 6650EZ

## (undated) DEVICE — BLADE KNIFE BEAVER MINI STR BEAVER6900

## (undated) DEVICE — PACK HEART RIGHT CUSTOM SAN32RHF18

## (undated) DEVICE — PACK ADULT HEART UMMC PV15CG92D

## (undated) DEVICE — CONNECTOR SIMS TUBING FOR CHEST TUBES 361

## (undated) DEVICE — SU MONOCRYL 4-0 PS-2 27" UND Y426H

## (undated) DEVICE — DRSG ABDOMINAL 07 1/2X8" 7197D

## (undated) DEVICE — SPECIMEN CONTAINER 5OZ STERILE 2600SA

## (undated) DEVICE — CLIP HORIZON LG ORANGE 004200

## (undated) DEVICE — SU PLEDGET SOFT TFE 3/8"X3/26"X1/16" PCP40

## (undated) DEVICE — Device

## (undated) DEVICE — DRAIN CHEST TUBE RIGHT ANGLED 28FR 8128

## (undated) DEVICE — TAPE MEDIPORE 4"X2YD 2864

## (undated) DEVICE — SU PROLENE 7-0 BV-1DA 4X24" M8702

## (undated) DEVICE — NDL COUNTER 20CT 31142493

## (undated) DEVICE — BONE WAX 2.5GM W31G

## (undated) DEVICE — SU VICRYL+ 3-0 FS1 27IN UND VCP442H

## (undated) DEVICE — SU STRATAFIX PDS PLUS 2-0 SPIRAL CT-1 45CM SXPP1B411

## (undated) DEVICE — SOL NACL 0.9% 10ML VIAL 0409-4888-02

## (undated) DEVICE — DEFIB PRO-PADZ LVP LQD GEL ADULT 8900-2105-01

## (undated) DEVICE — SU SILK 0 TIE 6X30" A306H

## (undated) DEVICE — BLADE KNIFE BEAVER MICROSHARP GREEN 377515

## (undated) DEVICE — LINEN GOWN XLG 5407

## (undated) DEVICE — CATH TRAY FOLEY SURESTEP 16FR W/TMP PRB STLK LATEX A319416AM

## (undated) DEVICE — SU PROLENE 3-0 SHDA 36" 8522H

## (undated) DEVICE — LEAD PACER MYOCARDIAL BIPOLAR TEMPORARY 53CM 6495F

## (undated) DEVICE — CLIP SPRING FOGARTY SOFTJAW CSOFT6

## (undated) DEVICE — CLIP HORIZON SM RED WIDE SLOT 001201

## (undated) DEVICE — SU PROLENE 8-0 BV130-5DA 24" 8732H

## (undated) DEVICE — SU ETHILON 2 LP 60" D7597

## (undated) DEVICE — SU ETHIBOND 3-0 BBDA 36" X588H

## (undated) DEVICE — POSITIONER ASSIST ESSTECH 3S T401210S

## (undated) DEVICE — BNDG ELASTIC 6"X5YDS STERILE 6611-6S

## (undated) DEVICE — SOL WATER IRRIG 1000ML BOTTLE 2F7114

## (undated) DEVICE — SU PROLENE 5-0 RB-1DA 36"  8556H

## (undated) DEVICE — SU PROLENE 5-0 RB-2DA 30" 8710H

## (undated) DEVICE — LINEN TOWEL PACK X30 5481

## (undated) DEVICE — SU STRATAFIX PDS PLUS 0 CT 45CM SXPP1A406

## (undated) DEVICE — SU ETHIBOND 2-0 SHDA 30" X563H

## (undated) DEVICE — PATCH SURGICAL EVARREST FIBRIN SEALANT 4X2IN EVT5024

## (undated) DEVICE — SUCTION MANIFOLD NEPTUNE 2 SYS 4 PORT 0702-020-000

## (undated) DEVICE — SU PROLENE 4-0 RB-1DA 36" 8557H

## (undated) DEVICE — SU DERMABOND ADVANCED .7ML DNX12

## (undated) DEVICE — WIPES FOLEY CARE SURESTEP PROVON DFC100

## (undated) DEVICE — BLOWER/MISTER CLEARVIEW 22150

## (undated) DEVICE — LINEN TOWEL PACK X6 WHITE 5487

## (undated) DEVICE — TIES BANDING T50R

## (undated) DEVICE — KIT MICROINTRODUCER VASCULAR  4FRX21GAX4CM

## (undated) DEVICE — PREP CHLORAPREP 26ML TINTED HI-LITE ORANGE 930815

## (undated) DEVICE — DRAPE C-ARM W/STRAPS 42X72" 07-CA104

## (undated) DEVICE — CATH BALLOON IABP 7.5FRX50ML INTRA-AORTIC 0684-00-0576-01U

## (undated) DEVICE — DRAPE FLUID WARMING 52 X 60" ORS-321

## (undated) DEVICE — PROTECTOR ARM ONE-STEP TRENDELENBURG 40418 (COI)

## (undated) DEVICE — CABLE MYO/LEAD PACING WHITE DISP 019-530

## (undated) DEVICE — SU STEEL MYO/WIRE II STERNOTOMY 8 BE-1 3X14" 048-217

## (undated) DEVICE — KIT ENDO VASOVIEW HEMOPRO 2 VH-4000

## (undated) DEVICE — CLIP HORIZON MED BLUE 002200

## (undated) DEVICE — UMMC CONVENIENCE KIT FORMALLY H9656021017160 NEW# 602101716

## (undated) DEVICE — ESU ELEC BLADE E-SEP INSULATED NEPTUNE 165MM 0703-165-002

## (undated) DEVICE — ESU ELEC BLADE E-SEP INSULATED NEPTUNE 70MM 0703-070-002

## (undated) DEVICE — SU PROLENE 4-0 SHDA 36" 8521H

## (undated) DEVICE — ANTIFOG SOLUTION W/FOAM PAD 31142527

## (undated) DEVICE — DRSG TELFA 3X8" 1238

## (undated) RX ORDER — HEPARIN SODIUM 1000 [USP'U]/ML
INJECTION, SOLUTION INTRAVENOUS; SUBCUTANEOUS
Status: DISPENSED
Start: 2024-09-10

## (undated) RX ORDER — CEFAZOLIN SODIUM 1 G/3ML
INJECTION, POWDER, FOR SOLUTION INTRAMUSCULAR; INTRAVENOUS
Status: DISPENSED
Start: 2024-09-10

## (undated) RX ORDER — GABAPENTIN 300 MG/1
CAPSULE ORAL
Status: DISPENSED
Start: 2024-09-10

## (undated) RX ORDER — BUPIVACAINE HYDROCHLORIDE 2.5 MG/ML
INJECTION, SOLUTION EPIDURAL; INFILTRATION; INTRACAUDAL
Status: DISPENSED
Start: 2024-09-10

## (undated) RX ORDER — FENTANYL CITRATE 50 UG/ML
INJECTION, SOLUTION INTRAMUSCULAR; INTRAVENOUS
Status: DISPENSED
Start: 2024-09-10

## (undated) RX ORDER — HYDROMORPHONE HYDROCHLORIDE 1 MG/ML
INJECTION, SOLUTION INTRAMUSCULAR; INTRAVENOUS; SUBCUTANEOUS
Status: DISPENSED
Start: 2024-09-10

## (undated) RX ORDER — PROPOFOL 10 MG/ML
INJECTION, EMULSION INTRAVENOUS
Status: DISPENSED
Start: 2024-09-10

## (undated) RX ORDER — LIDOCAINE 40 MG/G
CREAM TOPICAL
Status: DISPENSED
Start: 2025-06-10

## (undated) RX ORDER — ESMOLOL HYDROCHLORIDE 10 MG/ML
INJECTION INTRAVENOUS
Status: DISPENSED
Start: 2024-09-10

## (undated) RX ORDER — ACETAMINOPHEN 325 MG/1
TABLET ORAL
Status: DISPENSED
Start: 2024-09-10

## (undated) RX ORDER — CHLORHEXIDINE GLUCONATE ORAL RINSE 1.2 MG/ML
SOLUTION DENTAL
Status: DISPENSED
Start: 2024-09-10

## (undated) RX ORDER — CEFAZOLIN SODIUM/WATER 2 G/20 ML
SYRINGE (ML) INTRAVENOUS
Status: DISPENSED
Start: 2024-09-10

## (undated) RX ORDER — ASPIRIN 81 MG/1
TABLET, CHEWABLE ORAL
Status: DISPENSED
Start: 2024-09-10

## (undated) RX ORDER — LIDOCAINE HYDROCHLORIDE 10 MG/ML
INJECTION, SOLUTION EPIDURAL; INFILTRATION; INTRACAUDAL; PERINEURAL
Status: DISPENSED
Start: 2025-06-10

## (undated) RX ORDER — BUPIVACAINE HYDROCHLORIDE 5 MG/ML
INJECTION, SOLUTION EPIDURAL; INTRACAUDAL
Status: DISPENSED
Start: 2024-09-10

## (undated) RX ORDER — PAPAVERINE HYDROCHLORIDE 30 MG/ML
INJECTION INTRAMUSCULAR; INTRAVENOUS
Status: DISPENSED
Start: 2024-09-10

## (undated) RX ORDER — FAMOTIDINE 20 MG/1
TABLET, FILM COATED ORAL
Status: DISPENSED
Start: 2024-09-10